# Patient Record
Sex: MALE | Race: WHITE | NOT HISPANIC OR LATINO | Employment: FULL TIME | URBAN - METROPOLITAN AREA
[De-identification: names, ages, dates, MRNs, and addresses within clinical notes are randomized per-mention and may not be internally consistent; named-entity substitution may affect disease eponyms.]

---

## 2017-01-16 PROBLEM — Z00.00 ENCOUNTER FOR PREVENTIVE HEALTH EXAMINATION: Status: ACTIVE | Noted: 2017-01-16

## 2017-01-25 ENCOUNTER — OFFICE VISIT (OUTPATIENT)
Dept: INTERNAL MEDICINE | Facility: CLINIC | Age: 52
End: 2017-01-25
Payer: COMMERCIAL

## 2017-01-25 VITALS
OXYGEN SATURATION: 99 % | TEMPERATURE: 97.6 F | BODY MASS INDEX: 28.62 KG/M2 | HEIGHT: 70 IN | SYSTOLIC BLOOD PRESSURE: 120 MMHG | HEART RATE: 62 BPM | WEIGHT: 199.9 LBS | DIASTOLIC BLOOD PRESSURE: 76 MMHG

## 2017-01-25 DIAGNOSIS — I77.810 MILD ASCENDING AORTA DILATION (H): ICD-10-CM

## 2017-01-25 DIAGNOSIS — I50.22 CHF (CONGESTIVE HEART FAILURE), NYHA CLASS I, CHRONIC, SYSTOLIC (H): ICD-10-CM

## 2017-01-25 DIAGNOSIS — E78.5 HYPERLIPIDEMIA WITH TARGET LDL LESS THAN 100: ICD-10-CM

## 2017-01-25 DIAGNOSIS — I42.8 NONISCHEMIC CARDIOMYOPATHY (H): ICD-10-CM

## 2017-01-25 DIAGNOSIS — J06.9 VIRAL URI: ICD-10-CM

## 2017-01-25 DIAGNOSIS — I42.9 CARDIOMYOPATHY (H): Primary | ICD-10-CM

## 2017-01-25 PROCEDURE — 99213 OFFICE O/P EST LOW 20 MIN: CPT | Performed by: INTERNAL MEDICINE

## 2017-01-25 RX ORDER — CARVEDILOL 6.25 MG/1
6.25 TABLET ORAL
COMMUNITY
Start: 2016-08-17 | End: 2017-08-04

## 2017-01-25 NOTE — NURSING NOTE
"Chief Complaint   Patient presents with     Edema     on R side of throat X 4 days       Initial /76 mmHg  Pulse 62  Temp(Src) 97.6  F (36.4  C) (Oral)  Ht 5' 10\" (1.778 m)  Wt 199 lb 14.4 oz (90.674 kg)  BMI 28.68 kg/m2  SpO2 99% Estimated body mass index is 28.68 kg/(m^2) as calculated from the following:    Height as of this encounter: 5' 10\" (1.778 m).    Weight as of this encounter: 199 lb 14.4 oz (90.674 kg).  BP completed using cuff size: mi Catalan CMA      "

## 2017-01-25 NOTE — PATIENT INSTRUCTIONS
"  Viral upper respiratory infection:     *  No evidence for bacterial infection.     *  No antibiotics indicated based on examination today.  No fevers, normal throat, ears, and normal lung sounds.      *  Tylenol for fevers or headaches;     *  Motrin/Advil for any fevers, body/muscle aches.     *  Cough suppressant dextromethorphan, i.e. Delsym (or any cough medication with a \"DM\"), follow directions on bottle    *  Mucinex extended release, one or two tablets twice per day for the next 5-7 days.  This helps loosen the secretions to they can be passed more easily out of the body.     *  Be sure to drink lots of fluids.  If the appetite and intake of food is way down, then at leat try to eat soup.  Dehydration is the thing that causes people to end up in the hospital with viral infections and the flu.     *  For sore throat:  Try lozenges (Cepostat, Cepacol, hard candies, Zinc lozenges, etc)    *  If you have thick secretions:  Mucinex extended release twice per day on a regular basis for the next few days, then twice per day as needed.  OK to take the regular Mucinex, you may just have to take it 2-3 times per day.      *  If you have dry nasal passages or frequent bloody noses during the winter time:  Saline nasal spray as often as needed for dry nose.     *  For thick sinus secretions, consider using a \"Sinus Rinse Kit\" or Netti Pot For help rinsing out sinus cavities if there is a lot of debris from the sinuses    *  If you have a lot of congestion and/or runny noses:  OK to try decongestants as needed (e.g. pseudoephedrine or phenylephrine).  Be sure to take the lowest dose needed.  Take decongestants from only ONE source.  It is possible to inadvertantly take more than the recommended amounts if you take decongestants from multiple products (i.e. Do NOT take Mucinex-D and Claritin-D together)    *  If you have been told to NOT take decongestants or if you cannot tolerate decongestants due to effect on blood " pressure, sleep or heart rate:  Try Coricidin HBP or Chlortrimeton (chlorpheniramine).  These can have a similar effect as some decongestants but will not affect your heart rate or blood pressure.      *  If you have SEVERE nasal congestion:  Affrin nasal spray as needed for severe nasal congestion (especially before the airplane trip), but do not use for more than one week.      *  For nighttime congestion, consider taking your favorite nighttime multi symptoms cold reliever such as Nyquil, Vicks Formula 44, Tylenol multi symptoms nighttime cold reliever, etc.      *  Call the clinic if any changes in the symptoms such as worsening fevers, or the amount and quality of the sputum changes, or if you have any major difficulties breathing, or the symptoms fail to clear for a few weeks.    *  Most upper respiratory infection will clear 1-2 weeks, but it is not uncommon for post viral coughs to last for several weeks, even rarely the entire winter.

## 2017-01-25 NOTE — PROGRESS NOTES
SUBJECTIVE:                                                    Orlin Griggs is a 51 year old male who presents to clinic today for the following health issues:    Concern - edema     Onset: 4 days     Description:   Swelling in R side of throat, pain in jaw and up in ear    Intensity: moderate    Progression of Symptoms:  constant    Accompanying Signs & Symptoms:  Has seen dentist and no problems in teeth causing pain       Previous history of similar problem:       Precipitating factors:   Worsened by: sometimes worse after eating    Alleviating factors:  Improved by: nothing       Therapies Tried and outcome: no OTC pain relievers          Problem list and histories reviewed & adjusted, as indicated.  Additional history: as documented        Past Medical History:  ---------------------------  Past Medical History   Diagnosis Date     Anxiety      HTN (hypertension)      Cardiomyopathy (H)      idiopathic nonischemic cardiopmyopathy; 2/21/15 Echo shows EF 30-35%, 4/14/15 Echo shows EF improved to 43-46%     LBBB (left bundle branch block)      History of cocaine use      prior     Acute renal insufficiency      CHF (congestive heart failure) (H)      2/21/15 Echo shows EF 30-35%, 4/14/15 Echo shows EF improved to 43-46%     Hyperlipidemia LDL goal < 100        Past Surgical History:  ---------------------------  Past Surgical History   Procedure Laterality Date     Cholecystectomy         Current Medications:  ---------------------------  Current Outpatient Prescriptions   Medication Sig Dispense Refill     carvedilol (COREG) 6.25 MG tablet Take 6.25 mg by mouth       lisinopril (PRINIVIL,ZESTRIL) 5 MG tablet Take 1 tablet (5 mg) by mouth 2 times daily 180 tablet 3     carvedilol (COREG) 12.5 MG tablet Take 1 tablet (12.5 mg) by mouth 2 times daily (with meals) 180 tablet 3       Allergies:  -------------  No Known Allergies    Social History:  -------------------  Social History     Social History     Marital  "Status: Single     Spouse Name: N/A     Number of Children: N/A     Years of Education: N/A     Occupational History     Not on file.     Social History Main Topics     Smoking status: Never Smoker      Smokeless tobacco: Never Used     Alcohol Use: No     Drug Use: No     Sexual Activity: Not on file     Other Topics Concern     Parent/Sibling W/ Cabg, Mi Or Angioplasty Before 65f 55m? No     Caffeine Concern No     none     Sleep Concern No     Weight Concern Yes     weight loss     Special Diet Yes     watched his sodium intake     Exercise Yes     daily      Social History Narrative       Family Medical History:  ------------------------------  Family History   Problem Relation Age of Onset     Coronary Artery Disease No family hx of      DIABETES No family hx of      Breast Cancer Mother      CANCER Father      HEART DISEASE Brother      pacemaker     Pacemaker Brother      Family History Negative Brother      Family History Negative Brother          ROS:  REVIEW OF SYSTEMS:    RESP: negative for cough, dyspnea, wheezing, hemoptysis  CV: negative for chest pain, palpitations, PND, ARNDT, orthopnea; reports no changes in their ability to perform physical activity (from cardiovascular standpoint)  GI: negative for dysphagia, N/V, pain, melena, diarrhea and constipation  NEURO: negative for numbness/tingling, paralysis, incoordination, or focal weakness     OBJECTIVE:                                                    /76 mmHg  Pulse 62  Temp(Src) 97.6  F (36.4  C) (Oral)  Ht 5' 10\" (1.778 m)  Wt 199 lb 14.4 oz (90.674 kg)  BMI 28.68 kg/m2  SpO2 99%     GENERAL alert and no distress  EYES:  Normal sclera,conjunctiva, EOMI  HENT: oral and posterior pharynx without lesions or erythema, facies symmetric  NECK: Neck supple. No LAD, without thyroidmegaly or JVD., Carotids without bruits.  RESP: Clear to ausculation bilaterally without wheezes or crackles. Normal BS in all fields.  CV: RRR normal S1S2 without " murmurs, rubs or gallops. PMI normal  LYMPH: no cervical lymph adenopathy appreciated  MS: extremities- no gross deformities of the visible extremities noted, no edema  PSYCH: Alert and oriented times 3; speech- coherent  SKIN:  No obvious significant skin lesions on visible portions of face          ASSESSMENT/PLAN:                                                      (I42.9) Cardiomyopathy (H)  (primary encounter diagnosis)  Comment: This condition is currently controlled on the current medical regimen.  Continue current therapy.   revieed his lat visit with cardiologist and last echocardiogram at his requets.   Plan:     (I50.22) CHF (congestive heart failure), NYHA class I, chronic, systolic (H)  Comment: This condition is currently controlled on the current medical regimen.  Continue current therapy.   No evidence for congestive heart failure at this time.   Plan:     (E78.5) Hyperlipidemia with target LDL less than 100  Comment: Discussed new guidelines recommending a statin cholesterol lowering medication for any patient with either diabetes and/or vascular disease, aiming for a LDL goal under 100 for sure, ideally under 70.    Reviewed statins and their side effects including muscle pain, muscle inflammation, GI upset.  Told the patient to stop the medication in question and to call if any side effects develop.   Recommended CoQ10 200-300 mg at the same time as taking the statin medication to help reduce the chance of muscle side effects from the statin.    Plan:     (I42.9) Nonischemic cardiomyopathy (H)  Comment: This condition is currently controlled on the current medical regimen.  Continue current therapy.   Plan:     (I77.819) Mild ascending aorta dilation (H)  Comment: This condition is currently controlled on the current medical regimen.  Continue current therapy.   Plan:     (J06.9,  B97.89) Viral URI  Comment: Recommend conservative cares including fluids, rest, OTC decongestants/expectorants as  needed, tylenol or NSAIDs with food.  Beware of GI side effectcs (GI upset, GI bleed in worse case) with NSAIDs.  RTC or call if no improvement or any changes in sx.   Plan: No evidence for bacterial infection based on exam and history,  no antibiotics indicated.       See Patient Instructions    HALIMA SILVERMAN M.D., MD  [facility]

## 2017-02-01 DIAGNOSIS — I10 ESSENTIAL HYPERTENSION, BENIGN: ICD-10-CM

## 2017-02-01 LAB
ALT SERPL W P-5'-P-CCNC: <5 U/L (ref 5–30)
ANION GAP SERPL CALCULATED.3IONS-SCNC: 12 MMOL/L (ref 6–17)
BUN SERPL-MCNC: 21 MG/DL (ref 7–30)
CALCIUM SERPL-MCNC: 9.4 MG/DL (ref 8.5–10.5)
CHLORIDE SERPL-SCNC: 103 MMOL/L (ref 98–107)
CHOLEST SERPL-MCNC: 151 MG/DL
CO2 SERPL-SCNC: 30 MMOL/L (ref 23–29)
CREAT SERPL-MCNC: 1.33 MG/DL (ref 0.7–1.3)
GFR SERPL CREATININE-BSD FRML MDRD: 56 ML/MIN/1.7M2
GLUCOSE SERPL-MCNC: 104 MG/DL (ref 70–105)
HDLC SERPL-MCNC: 30 MG/DL
LDLC SERPL CALC-MCNC: 87 MG/DL
NONHDLC SERPL-MCNC: 121 MG/DL
POTASSIUM SERPL-SCNC: 4 MMOL/L (ref 3.5–5.1)
SODIUM SERPL-SCNC: 141 MMOL/L (ref 136–145)
TRIGL SERPL-MCNC: 169 MG/DL

## 2017-02-01 PROCEDURE — 84460 ALANINE AMINO (ALT) (SGPT): CPT | Performed by: INTERNAL MEDICINE

## 2017-02-01 PROCEDURE — 36415 COLL VENOUS BLD VENIPUNCTURE: CPT | Performed by: INTERNAL MEDICINE

## 2017-02-01 PROCEDURE — 80061 LIPID PANEL: CPT | Performed by: INTERNAL MEDICINE

## 2017-02-01 PROCEDURE — 80048 BASIC METABOLIC PNL TOTAL CA: CPT | Performed by: INTERNAL MEDICINE

## 2017-02-07 ENCOUNTER — OFFICE VISIT (OUTPATIENT)
Dept: CARDIOLOGY | Facility: CLINIC | Age: 52
End: 2017-02-07
Attending: INTERNAL MEDICINE
Payer: COMMERCIAL

## 2017-02-07 ENCOUNTER — TELEPHONE (OUTPATIENT)
Dept: CARDIOLOGY | Facility: CLINIC | Age: 52
End: 2017-02-07

## 2017-02-07 VITALS
SYSTOLIC BLOOD PRESSURE: 118 MMHG | HEIGHT: 70 IN | HEART RATE: 51 BPM | DIASTOLIC BLOOD PRESSURE: 84 MMHG | WEIGHT: 203.8 LBS | BODY MASS INDEX: 29.18 KG/M2

## 2017-02-07 DIAGNOSIS — E78.5 HYPERLIPIDEMIA WITH TARGET LDL LESS THAN 100: ICD-10-CM

## 2017-02-07 DIAGNOSIS — I77.810 MILD ASCENDING AORTA DILATION (H): ICD-10-CM

## 2017-02-07 DIAGNOSIS — R00.2 PALPITATIONS: ICD-10-CM

## 2017-02-07 DIAGNOSIS — I10 ESSENTIAL HYPERTENSION, BENIGN: ICD-10-CM

## 2017-02-07 DIAGNOSIS — I42.8 NONISCHEMIC CARDIOMYOPATHY (H): Primary | ICD-10-CM

## 2017-02-07 DIAGNOSIS — I44.7 LBBB (LEFT BUNDLE BRANCH BLOCK): ICD-10-CM

## 2017-02-07 PROCEDURE — 99214 OFFICE O/P EST MOD 30 MIN: CPT | Performed by: INTERNAL MEDICINE

## 2017-02-07 NOTE — PROGRESS NOTES
HPI and Plan:   See dictation    Orders Placed This Encounter   Procedures     Basic metabolic panel     Lipid Profile     ALT     Follow-Up with Cardiologist     Echocardiogram       No orders of the defined types were placed in this encounter.       There are no discontinued medications.      Encounter Diagnoses   Name Primary?     Nonischemic cardiomyopathy (H) Yes     Palpitations      Essential hypertension, benign      Mild ascending aorta dilation (H)      Hyperlipidemia with target LDL less than 100      LBBB (left bundle branch block)        CURRENT MEDICATIONS:  Current Outpatient Prescriptions   Medication Sig Dispense Refill     carvedilol (COREG) 6.25 MG tablet Take 6.25 mg by mouth       lisinopril (PRINIVIL,ZESTRIL) 5 MG tablet Take 1 tablet (5 mg) by mouth 2 times daily 180 tablet 3     carvedilol (COREG) 12.5 MG tablet Take 1 tablet (12.5 mg) by mouth 2 times daily (with meals) 180 tablet 3       ALLERGIES   No Known Allergies    PAST MEDICAL HISTORY:  Past Medical History   Diagnosis Date     Anxiety      HTN (hypertension)      Cardiomyopathy (H)      idiopathic nonischemic cardiopmyopathy; 2/21/15 Echo shows EF 30-35%, 4/14/15 Echo shows EF improved to 43-46%     LBBB (left bundle branch block)      History of cocaine use      prior     Acute renal insufficiency      CHF (congestive heart failure) (H)      2/21/15 Echo shows EF 30-35%, 4/14/15 Echo shows EF improved to 43-46%     Hyperlipidemia LDL goal < 100        PAST SURGICAL HISTORY:  Past Surgical History   Procedure Laterality Date     Cholecystectomy         FAMILY HISTORY:  Family History   Problem Relation Age of Onset     Coronary Artery Disease No family hx of      DIABETES No family hx of      Breast Cancer Mother      CANCER Father      HEART DISEASE Brother      pacemaker     Pacemaker Brother      Family History Negative Brother      Family History Negative Brother        SOCIAL HISTORY:  Social History     Social History      "Marital Status: Single     Spouse Name: N/A     Number of Children: N/A     Years of Education: N/A     Social History Main Topics     Smoking status: Never Smoker      Smokeless tobacco: Never Used     Alcohol Use: No     Drug Use: No     Sexual Activity: Not Asked     Other Topics Concern     Parent/Sibling W/ Cabg, Mi Or Angioplasty Before 65f 55m? No     Caffeine Concern No     none     Sleep Concern No     Weight Concern Yes     weight loss     Special Diet Yes     watched his sodium intake     Exercise Yes     daily      Social History Narrative       Review of Systems:  Skin:  Negative       Eyes:  Positive for glasses;contacts    ENT:  Negative      Respiratory:  Negative       Cardiovascular:  Negative      Gastroenterology: Negative      Genitourinary:  not assessed      Musculoskeletal:  Negative      Neurologic:  Negative      Psychiatric:  Negative      Heme/Lymph/Imm:  Negative      Endocrine:  Negative        Physical Exam:  Vitals: /84 mmHg  Pulse 51  Ht 1.778 m (5' 10\")  Wt 92.443 kg (203 lb 12.8 oz)  BMI 29.24 kg/m2    Constitutional:  cooperative, alert and oriented, well developed, well nourished, in no acute distress        Skin:  warm and dry to the touch        Head:           Eyes:  pupils equal and round        ENT:  no pallor or cyanosis, dentition good        Neck:  carotid pulses are full and equal bilaterally, JVP normal, no carotid bruit, no thyromegaly        Chest:  clear to auscultation          Cardiac: regular rhythm, normal S1/S2, no S3 or S4, apical impulse not displaced, no murmurs, gallops or rubs                  Abdomen:  not assessed this visit        Vascular: not assessed this visit                                        Extremities and Back:  no edema              Neurological:  affect appropriate, oriented to time, person and place              CC  Jose Mata MD   [facility]  [address]  [place] [id number]                "

## 2017-02-07 NOTE — MR AVS SNAPSHOT
After Visit Summary   2/7/2017    Orlin Griggs    MRN: [id number]           Patient Information     Date Of Birth          1965        Visit Information        Provider Department      2/7/2017 10:15 AM Jose Mata MD [facility]        Today's Diagnoses     Nonischemic cardiomyopathy (H)    -  1     Palpitations         Essential hypertension, benign         Mild ascending aorta dilation (H)         Hyperlipidemia with target LDL less than 100         LBBB (left bundle branch block)            Follow-ups after your visit        Additional Services     Follow-Up with Cardiologist                 Future tests that were ordered for you today     Open Future Orders        Priority Expected Expires Ordered    Lipid Profile Routine 8/6/2017 2/7/2018 2/7/2017    ALT Routine 8/6/2017 2/7/2018 2/7/2017    Basic metabolic panel Routine 8/6/2017 2/7/2018 2/7/2017    Echocardiogram Routine 8/6/2017 2/7/2018 2/7/2017    Follow-Up with Cardiologist Routine 8/6/2017 2/7/2018 2/7/2017            Who to contact     If you have questions or need follow up information about today's clinic visit or your schedule please contact [facility] directly at [phone].  Normal or non-critical lab and imaging results will be communicated to you by [employer], letter or phone within 4 business days after the clinic has received the results. If you do not hear from us within 7 days, please contact the clinic through [employer] or phone. If you have a critical or abnormal lab result, we will notify you by phone as soon as possible.  Submit refill requests through [employer] or call your pharmacy and they will forward the refill request to us. Please allow 3 business days for your refill to be completed.          Additional Information About Your Visit        [employer] Information     [employer] lets you send messages to your doctor, view your test  "results, renew your prescriptions, schedule appointments and more. To sign up, go to [email or web address] . Click on \"Log in\" on the left side of the screen, which will take you to the Welcome page. Then click on \"Sign up Now\" on the right side of the page.     You will be asked to enter the access code listed below, as well as some personal information. Please follow the directions to create your username and password.     Your access code is: [email or web address]  Expires: 2017 11:17 AM     Your access code will  in 90 days. If you need help or a new code, please call your [facility] clinic or [phone].        Care EveryWhere ID     This is your Care EveryWhere ID. This could be used by other organizations to access your [facility] medical records  [id number]        Your Vitals Were     Pulse Height BMI (Body Mass Index)             51 1.778 m (5' 10\") 29.24 kg/m2          Blood Pressure from Last 3 Encounters:   17 118/84   17 120/76   07/15/16 122/84    Weight from Last 3 Encounters:   17 92.443 kg (203 lb 12.8 oz)   17 90.674 kg (199 lb 14.4 oz)   07/15/16 91.264 kg (201 lb 3.2 oz)              We Performed the Following     Follow-Up with Cardiologist        Primary Care Provider Office Phone # Fax #    Lyndon Steve [phone]       [facility] [address] [place] [id number]        Thank you!     Thank you for choosing [facility] [place]  for your care. Our goal is always to provide you with excellent care. Hearing back from our patients is one way we can continue to improve our services. Please take a few minutes to complete the written survey that you may receive in the mail after your visit with us. Thank you!             Your Updated Medication List - Protect others around you: Learn how to safely use, store and throw away your medicines at www.disposemymeds.org.          This list is accurate as " of: 2/7/17 11:17 AM.  Always use your most recent med list.                   Brand Name Dispense Instructions for use    * carvedilol 12.5 MG tablet    COREG    180 tablet    Take 1 tablet (12.5 mg) by mouth 2 times daily (with meals)       * carvedilol 6.25 MG tablet    COREG     Take 6.25 mg by mouth       lisinopril 5 MG tablet    PRINIVIL/ZESTRIL    180 tablet    Take 1 tablet (5 mg) by mouth 2 times daily       * Notice:  This list has 2 medication(s) that are the same as other medications prescribed for you. Read the directions carefully, and ask your doctor or other care provider to review them with you.

## 2017-02-07 NOTE — Clinical Note
2017             Lyndon Steve MD    [facility]   [address] [place] [address]      RE:    Orlin Griggs   MRN:  [id number]   :  1965      Dear Jamel:      I had the opportunity to see Mr. Orlin Griggs in Cardiology Clinic today at the [facility] in [place] for reevaluation of idiopathic cardiomyopathy.  As you may recall, he is a 51-year-old gentleman who was hospitalized in early  with congestive heart failure and diagnosed with idiopathic cardiomyopathy with an ejection fraction initially of 30%-35%.  He has normal coronary arteries by CT angiogram.  He was treated aggressively with medical therapy, primarily carvedilol and lisinopril, and a followup cardiac MRI has demonstrated that his ejection fraction improved up to 51%.  His echocardiograms have consistently demonstrated an ejection fraction in the range of 45%-50%.  Most recently, his last cardiac MRI was performed on 2016 and showed some continued improvement of his left ventricular function now with an ejection fraction up to 54%.  His right ventricular size and function has normalized completely with an ejection fraction of 65%.  There is no evidence of any myocardial scar, inflammation or infiltration.  We have also been keeping a close eye on his ascending aorta.  He has had some mild dilation, measured at 4.0 cm on the most recent cardiac MRI.  His left ventricular chamber size has completely normalized.      He continues to eat a very healthy diet and exercise regularly, although he indicates that he has not been exercising as vigorously as he had before.  He is eager to step up his exercise program once again.  He is not experiencing any concerning shortness of breath or chest discomfort symptoms.  He has had some palpitations which he seems to be able to correlate with either some mild alcohol use or increase in sodium intake on the day prior to those symptoms.  Fortunately,  those seem to be brief and only rare events.  He has no sustained palpitations for any prolonged period of time.  These seem to be mostly momentary palpitations that are frequent for several hours and then resolve again.      On examination today, his blood pressure was initially 118/84 and I retook it and found it to be 114/76.  His heart rate was 51 beats per minute.  Weight 203.  His lungs are quite clear.  His heart rhythm is regular.  He has no cardiac murmurs or carotid bruits.      IMPRESSIONS:  Mr. Orlin Griggs is a 51-year-old gentleman with a history of idiopathic cardiomyopathy with near normalization of left ventricular function with medical therapy.  He has some symptoms of palpitations which I suspect are due to some occasional benign atrial and ventricular ectopy, but not likely concerning.  His laboratory studies look pretty good, although I see that his creatinine is just slightly above the normal limit and unchanged since last July.  I suspect that may be due to lisinopril.  His cholesterol panel continues to look excellent, although his HDL has gone down slightly, probably due to some decrease in his usually vigorous exercise program.      He did go to [facility] in [place] for a second opinion regarding his cardiac status.  Fortunately, they were able to confirm our findings here and the treatment program.  He is pleased that he is on the right track with these issues.      I will plan to have him return to visit with me again in 6 months and repeat an echocardiogram again at that time.      Sincerely,         Jose Mata MD, F.A.C.C.

## 2017-02-08 NOTE — PROGRESS NOTES
2017             Lyndon Steve MD    [facility]   [address] [place] [address]      RE:    Orlin Griggs   MRN:  [id number]   :  1965      Dear Jamel:      I had the opportunity to see Mr. Orlin Griggs in Cardiology Clinic today at the [facility] in [place] for reevaluation of idiopathic cardiomyopathy.  As you may recall, he is a 51-year-old gentleman who was hospitalized in early  with congestive heart failure and diagnosed with idiopathic cardiomyopathy with an ejection fraction initially of 30%-35%.  He has normal coronary arteries by CT angiogram.  He was treated aggressively with medical therapy, primarily carvedilol and lisinopril, and a followup cardiac MRI has demonstrated that his ejection fraction improved up to 51%.  His echocardiograms have consistently demonstrated an ejection fraction in the range of 45%-50%.  Most recently, his last cardiac MRI was performed on 2016 and showed some continued improvement of his left ventricular function now with an ejection fraction up to 54%.  His right ventricular size and function has normalized completely with an ejection fraction of 65%.  There is no evidence of any myocardial scar, inflammation or infiltration.  We have also been keeping a close eye on his ascending aorta.  He has had some mild dilation, measured at 4.0 cm on the most recent cardiac MRI.  His left ventricular chamber size has completely normalized.      He continues to eat a very healthy diet and exercise regularly, although he indicates that he has not been exercising as vigorously as he had before.  He is eager to step up his exercise program once again.  He is not experiencing any concerning shortness of breath or chest discomfort symptoms.  He has had some palpitations which he seems to be able to correlate with either some mild alcohol use or increase in sodium intake on the day prior to those symptoms.  Fortunately,  those seem to be brief and only rare events.  He has no sustained palpitations for any prolonged period of time.  These seem to be mostly momentary palpitations that are frequent for several hours and then resolve again.      On examination today, his blood pressure was initially 118/84 and I retook it and found it to be 114/76.  His heart rate was 51 beats per minute.  Weight 203.  His lungs are quite clear.  His heart rhythm is regular.  He has no cardiac murmurs or carotid bruits.      IMPRESSIONS:  Mr. Orlin Griggs is a 51-year-old gentleman with a history of idiopathic cardiomyopathy with near normalization of left ventricular function with medical therapy.  He has some symptoms of palpitations which I suspect are due to some occasional benign atrial and ventricular ectopy, but not likely concerning.  His laboratory studies look pretty good, although I see that his creatinine is just slightly above the normal limit and unchanged since last July.  I suspect that may be due to lisinopril.  His cholesterol panel continues to look excellent, although his HDL has gone down slightly, probably due to some decrease in his usually vigorous exercise program.      He did go to [facility] in [place] for a second opinion regarding his cardiac status.  Fortunately, they were able to confirm our findings here and the treatment program.  He is pleased that he is on the right track with these issues.      I will plan to have him return to visit with me again in 6 months and repeat an echocardiogram again at that time.      Sincerely,         Meena Mata MD, F.A.C.C.         MEENA MATA MD, [facility]             D: 2017 17:45   T: 2017 08:56   MT: LESLEY      Name:     ORLIN GRIGGS   MRN:      [id number]        Account:      [id number]   :      1965           Service Date: 2017      Document: [id number]

## 2017-02-14 ENCOUNTER — APPOINTMENT (OUTPATIENT)
Dept: ORTHOPEDIC SURGERY | Facility: CLINIC | Age: 52
End: 2017-02-14

## 2017-03-02 ENCOUNTER — TELEPHONE (OUTPATIENT)
Dept: CARDIOLOGY | Facility: CLINIC | Age: 52
End: 2017-03-02

## 2017-03-02 DIAGNOSIS — I50.22 CHF (CONGESTIVE HEART FAILURE), NYHA CLASS I, CHRONIC, SYSTOLIC (H): ICD-10-CM

## 2017-03-02 RX ORDER — CARVEDILOL 12.5 MG/1
12.5 TABLET ORAL 2 TIMES DAILY WITH MEALS
Qty: 180 TABLET | Refills: 3 | Status: SHIPPED | OUTPATIENT
Start: 2017-03-02 | End: 2017-08-23

## 2017-03-02 NOTE — TELEPHONE ENCOUNTER
Contacted patient to verify Carvedilol 12.5mg BID and 6.25mg BID to equal 18.75mg .  Patient does need both RX's

## 2017-03-09 ENCOUNTER — DOCUMENTATION ONLY (OUTPATIENT)
Dept: CARDIOLOGY | Facility: CLINIC | Age: 52
End: 2017-03-09

## 2017-03-09 NOTE — PROGRESS NOTES
Incoming records as requested from [facility] received. Copy sent to HIM to be scanned and a copy for Dr. Mata at nurses desk to review.

## 2017-07-03 DIAGNOSIS — I42.9 CARDIOMYOPATHY (H): ICD-10-CM

## 2017-07-03 RX ORDER — LISINOPRIL 5 MG/1
5 TABLET ORAL 2 TIMES DAILY
Qty: 180 TABLET | Refills: 0 | Status: SHIPPED | OUTPATIENT
Start: 2017-07-03 | End: 2017-08-23

## 2017-07-21 ENCOUNTER — HOSPITAL ENCOUNTER (OUTPATIENT)
Dept: CARDIOLOGY | Facility: CLINIC | Age: 52
Discharge: HOME | End: 2017-07-21
Attending: INTERNAL MEDICINE | Admitting: INTERNAL MEDICINE
Payer: COMMERCIAL

## 2017-07-21 DIAGNOSIS — I42.8 NONISCHEMIC CARDIOMYOPATHY (H): ICD-10-CM

## 2017-07-21 PROCEDURE — 93306 TTE W/DOPPLER COMPLETE: CPT

## 2017-07-21 PROCEDURE — 93306 TTE W/DOPPLER COMPLETE: CPT | Mod: 26 | Performed by: INTERNAL MEDICINE

## 2017-08-04 DIAGNOSIS — I50.9 CHF (CONGESTIVE HEART FAILURE) (H): Primary | ICD-10-CM

## 2017-08-04 RX ORDER — CARVEDILOL 6.25 MG/1
6.25 TABLET ORAL 2 TIMES DAILY WITH MEALS
Qty: 60 TABLET | Refills: 0 | Status: SHIPPED | OUTPATIENT
Start: 2017-08-04 | End: 2017-08-23

## 2017-08-23 ENCOUNTER — OFFICE VISIT (OUTPATIENT)
Dept: CARDIOLOGY | Facility: CLINIC | Age: 52
End: 2017-08-23
Attending: INTERNAL MEDICINE
Payer: COMMERCIAL

## 2017-08-23 VITALS
WEIGHT: 203.2 LBS | BODY MASS INDEX: 29.09 KG/M2 | HEART RATE: 57 BPM | DIASTOLIC BLOOD PRESSURE: 86 MMHG | SYSTOLIC BLOOD PRESSURE: 128 MMHG | HEIGHT: 70 IN

## 2017-08-23 DIAGNOSIS — R00.2 PALPITATIONS: ICD-10-CM

## 2017-08-23 DIAGNOSIS — E78.5 HYPERLIPIDEMIA WITH TARGET LDL LESS THAN 100: ICD-10-CM

## 2017-08-23 DIAGNOSIS — I44.7 LBBB (LEFT BUNDLE BRANCH BLOCK): ICD-10-CM

## 2017-08-23 DIAGNOSIS — I10 ESSENTIAL HYPERTENSION, BENIGN: ICD-10-CM

## 2017-08-23 DIAGNOSIS — I42.8 NONISCHEMIC CARDIOMYOPATHY (H): ICD-10-CM

## 2017-08-23 DIAGNOSIS — I77.810 MILD ASCENDING AORTA DILATION (H): ICD-10-CM

## 2017-08-23 DIAGNOSIS — I42.8 NONISCHEMIC CARDIOMYOPATHY (H): Primary | ICD-10-CM

## 2017-08-23 LAB
ALT SERPL W P-5'-P-CCNC: <5 U/L (ref 5–30)
ANION GAP SERPL CALCULATED.3IONS-SCNC: 7.6 MMOL/L (ref 6–17)
BUN SERPL-MCNC: 15 MG/DL (ref 7–30)
CALCIUM SERPL-MCNC: 9.4 MG/DL (ref 8.5–10.5)
CHLORIDE SERPL-SCNC: 106 MMOL/L (ref 98–107)
CHOLEST SERPL-MCNC: 176 MG/DL
CO2 SERPL-SCNC: 30 MMOL/L (ref 23–29)
CREAT SERPL-MCNC: 1.25 MG/DL (ref 0.7–1.3)
GFR SERPL CREATININE-BSD FRML MDRD: 60 ML/MIN/1.7M2
GLUCOSE SERPL-MCNC: 100 MG/DL (ref 70–105)
HDLC SERPL-MCNC: 37 MG/DL
LDLC SERPL CALC-MCNC: 112 MG/DL
NONHDLC SERPL-MCNC: 139 MG/DL
POTASSIUM SERPL-SCNC: 4.6 MMOL/L (ref 3.5–5.1)
SODIUM SERPL-SCNC: 139 MMOL/L (ref 136–145)
TRIGL SERPL-MCNC: 134 MG/DL

## 2017-08-23 PROCEDURE — 99214 OFFICE O/P EST MOD 30 MIN: CPT | Performed by: INTERNAL MEDICINE

## 2017-08-23 PROCEDURE — 84460 ALANINE AMINO (ALT) (SGPT): CPT | Performed by: INTERNAL MEDICINE

## 2017-08-23 PROCEDURE — 80061 LIPID PANEL: CPT | Performed by: INTERNAL MEDICINE

## 2017-08-23 PROCEDURE — 80048 BASIC METABOLIC PNL TOTAL CA: CPT | Performed by: INTERNAL MEDICINE

## 2017-08-23 PROCEDURE — 36415 COLL VENOUS BLD VENIPUNCTURE: CPT | Performed by: INTERNAL MEDICINE

## 2017-08-23 RX ORDER — CARVEDILOL 6.25 MG/1
6.25 TABLET ORAL 2 TIMES DAILY WITH MEALS
Qty: 60 TABLET | Refills: 0 | Status: SHIPPED | OUTPATIENT
Start: 2017-08-23 | End: 2017-08-31

## 2017-08-23 RX ORDER — CARVEDILOL 12.5 MG/1
12.5 TABLET ORAL 2 TIMES DAILY WITH MEALS
Qty: 180 TABLET | Refills: 3 | Status: SHIPPED | OUTPATIENT
Start: 2017-08-23 | End: 2017-12-01

## 2017-08-23 RX ORDER — LISINOPRIL 5 MG/1
5 TABLET ORAL 2 TIMES DAILY
Qty: 180 TABLET | Refills: 3 | Status: SHIPPED | OUTPATIENT
Start: 2017-08-23 | End: 2017-12-01

## 2017-08-23 NOTE — PROGRESS NOTES
HISTORY OF PRESENT ILLNESS:  I had the opportunity to see Mr. Orlin Griggs in Cardiology Clinic today at the [facility] in [place] for reevaluation of idiopathic cardiomyopathy.  As you know, he is a 52-year-old gentleman who had an ejection fraction estimated to be as low as 30%-35% by echocardiogram initially when he was diagnosed with cardiomyopathy in 2015.  His coronary arteries were normal by CT angiogram.  With medical therapy, including carvedilol and lisinopril, he has had an excellent response.  His ejection fraction improved up to 51% by cardiac MRI initially and more recently 54%.  His right ventricular ejection fraction completely normalized up to 65%.        His echocardiograms have continued to show borderline low left ventricular function with an ejection fraction of 45%-50%, although that may be underestimated due to the appearance of septal dyskinesis due to underlying left bundle branch block.  His MRI has not shown any evidence of myocardial scar, inflammation or infiltration.  He also has had mild dilation of the ascending aorta, measured at 4.0 cm on cardiac MRI and consistently about 4.2 cm by echo.  He had a followup echocardiogram most recently done on 07/21/2017, which again showed stable ascending aorta, a measurement of 4.2 cm and ejection fraction of 45%-50%.  He has the usual trace to mild mitral and tricuspid regurgitation.  His left ventricular chamber size is completely normal at 4.4 cm.      He continues to do very well without any concerning cardiac symptoms.  He has no shortness of breath, chest discomfort, lightheadedness or even palpitations.  He was experiencing some palpitations in the past but recently he has been feeling very well and his palpitations have even resolved.      He has had some borderline creatinine numbers in the past.  Today, his creatinine was 1.25, indicating a GFR 60, which is at the upper limits of normal.  His cholesterol numbers are  good, although a bit higher than previous numbers.      PHYSICAL EXAMINATION:  His blood pressure is 128/86, which is a bit higher than usual for him as well.  He admits to being a bit anxious for our discussion today.  His heart rate was 57 and weight 203 pounds.  His lungs are clear.  His heart rhythm is regular.  He has no cardiac murmurs or carotid bruits.      IMPRESSIONS:  Mr. Orlin Griggs is a 52-year-old gentleman with stable, mild, idiopathic cardiomyopathy without any evidence of congestive heart failure or significant arrhythmia.  His left ventricular function has essentially normalized by cardiac MRI, although was slightly below the limit by echocardiogram.  He has no significant valvular disease or arrhythmia.  His medical therapy is excellent.  His blood pressure is well controlled and his cholesterol numbers are good.  He has no significant underlying vascular disease.      I reassured him that this process is stable and his medical therapy is quite appropriate.  I did not make any medication changes today.  I did continue to urge him to refrain from consistent heavy lifting, but occasional lifting for a job might not cause him any significant problems.  I will have him follow up with me again in 6 months and we will repeat a cardiac MRI to continue to monitor his aorta and left ventricular function.      cc:      Lyndon Steve MD    [facility]   [address] [place] [id number]         MEENA AGAGRWAL MD, [facility]             D: 2017 17:36   T: 2017 18:15   MT: LESLEY      Name:     ORLIN GRIGGS   MRN:      [id number]        Account:      [id number]   :      1965           Service Date: 2017      Document: [id number]

## 2017-08-23 NOTE — LETTER
8/23/2017    Lyndon Steve MD  [address] [facility] [address]    RE: Orlin Griggs       Dear Colleague,    I had the pleasure of seeing Orlin Griggs in the [facility].    I had the opportunity to see Mr. Orlin Griggs in Cardiology Clinic today at the [facility] in [place] for reevaluation of idiopathic cardiomyopathy.  As you know, he is a 52-year-old gentleman who had an ejection fraction estimated to be as low as 30%-35% by echocardiogram initially when he was diagnosed with cardiomyopathy in 2015.  His coronary arteries were normal by CT angiogram.  With medical therapy, including carvedilol and lisinopril, he has had an excellent response.  His ejection fraction improved up to 51% by cardiac MRI initially and more recently 54%.  His right ventricular ejection fraction completely normalized up to 65%.        His echocardiograms have continued to show borderline low left ventricular function with an ejection fraction of 45%-50%, although that may be underestimated due to the appearance of septal dyskinesis due to underlying left bundle branch block.  His MRI has not shown any evidence of myocardial scar, inflammation or infiltration.  He also has had mild dilation of the ascending aorta, measured at 4.0 cm on cardiac MRI and consistently about 4.2 cm by echo.  He had a followup echocardiogram most recently done on 07/21/2017, which again showed stable ascending aorta, a measurement of 4.2 cm and ejection fraction of 45%-50%.  He has the usual trace to mild mitral and tricuspid regurgitation.  His left ventricular chamber size is completely normal at 4.4 cm.      He continues to do very well without any concerning cardiac symptoms.  He has no shortness of breath, chest discomfort, lightheadedness or even palpitations.  He was experiencing some palpitations in the past but recently he has been feeling very well and his palpitations have even  resolved.      He has had some borderline creatinine numbers in the past.  Today, his creatinine was 1.25, indicating a GFR 60, which is at the upper limits of normal.  His cholesterol numbers are good, although a bit higher than previous numbers.      PHYSICAL EXAMINATION:  His blood pressure is 128/86, which is a bit higher than usual for him as well.  He admits to being a bit anxious for our discussion today.  His heart rate was 57 and weight 203 pounds.  His lungs are clear.  His heart rhythm is regular.  He has no cardiac murmurs or carotid bruits.     Outpatient Encounter Prescriptions as of 8/23/2017   Medication Sig Dispense Refill     lisinopril (PRINIVIL/ZESTRIL) 5 MG tablet Take 1 tablet (5 mg) by mouth 2 times daily 180 tablet 3     carvedilol (COREG) 12.5 MG tablet Take 1 tablet (12.5 mg) by mouth 2 times daily (with meals) 180 tablet 3     [DISCONTINUED] carvedilol (COREG) 6.25 MG tablet Take 1 tablet (6.25 mg) by mouth 2 times daily (with meals) 60 tablet 0     [DISCONTINUED] carvedilol (COREG) 6.25 MG tablet Take 1 tablet (6.25 mg) by mouth 2 times daily (with meals) 60 tablet 0     [DISCONTINUED] lisinopril (PRINIVIL/ZESTRIL) 5 MG tablet Take 1 tablet (5 mg) by mouth 2 times daily 180 tablet 0     [DISCONTINUED] carvedilol (COREG) 12.5 MG tablet Take 1 tablet (12.5 mg) by mouth 2 times daily (with meals) 180 tablet 3     No facility-administered encounter medications on file as of 8/23/2017.       IMPRESSIONS:  Mr. Orlin Griggs is a 52-year-old gentleman with stable, mild, idiopathic cardiomyopathy without any evidence of congestive heart failure or significant arrhythmia.  His left ventricular function has essentially normalized by cardiac MRI, although was slightly below the limit by echocardiogram.  He has no significant valvular disease or arrhythmia.  His medical therapy is excellent.  His blood pressure is well controlled and his cholesterol numbers are good.  He has no significant underlying  vascular disease.      I reassured him that this process is stable and his medical therapy is quite appropriate.  I did not make any medication changes today.  I did continue to urge him to refrain from consistent heavy lifting, but occasional lifting for a job might not cause him any significant problems.  I will have him follow up with me again in 6 months and we will repeat a cardiac MRI to continue to monitor his aorta and left ventricular function.     Again, thank you for allowing me to participate in the care of your patient.      Sincerely,    MEENA AGGARWAL MD     [facility]

## 2017-08-23 NOTE — PROGRESS NOTES
HPI and Plan:   See dictation    Orders Placed This Encounter   Procedures     MRI Cardiac w/contrast     Lipid Profile     ALT     Basic metabolic panel     Follow-Up with Cardiologist       Orders Placed This Encounter   Medications     lisinopril (PRINIVIL/ZESTRIL) 5 MG tablet     Sig: Take 1 tablet (5 mg) by mouth 2 times daily     Dispense:  180 tablet     Refill:  3     carvedilol (COREG) 6.25 MG tablet     Sig: Take 1 tablet (6.25 mg) by mouth 2 times daily (with meals)     Dispense:  60 tablet     Refill:  0     carvedilol (COREG) 12.5 MG tablet     Sig: Take 1 tablet (12.5 mg) by mouth 2 times daily (with meals)     Dispense:  180 tablet     Refill:  3       Medications Discontinued During This Encounter   Medication Reason     lisinopril (PRINIVIL/ZESTRIL) 5 MG tablet Reorder     carvedilol (COREG) 6.25 MG tablet Reorder     carvedilol (COREG) 12.5 MG tablet Reorder         Encounter Diagnoses   Name Primary?     Nonischemic cardiomyopathy (H) Yes     Hyperlipidemia with target LDL less than 100      Essential hypertension, benign      Palpitations      Mild ascending aorta dilation (H)      LBBB (left bundle branch block)        CURRENT MEDICATIONS:  Current Outpatient Prescriptions   Medication Sig Dispense Refill     lisinopril (PRINIVIL/ZESTRIL) 5 MG tablet Take 1 tablet (5 mg) by mouth 2 times daily 180 tablet 3     carvedilol (COREG) 6.25 MG tablet Take 1 tablet (6.25 mg) by mouth 2 times daily (with meals) 60 tablet 0     carvedilol (COREG) 12.5 MG tablet Take 1 tablet (12.5 mg) by mouth 2 times daily (with meals) 180 tablet 3     [DISCONTINUED] carvedilol (COREG) 6.25 MG tablet Take 1 tablet (6.25 mg) by mouth 2 times daily (with meals) 60 tablet 0     [DISCONTINUED] lisinopril (PRINIVIL/ZESTRIL) 5 MG tablet Take 1 tablet (5 mg) by mouth 2 times daily 180 tablet 0     [DISCONTINUED] carvedilol (COREG) 12.5 MG tablet Take 1 tablet (12.5 mg) by mouth 2 times daily (with meals) 180 tablet 3        ALLERGIES   No Known Allergies    PAST MEDICAL HISTORY:  Past Medical History:   Diagnosis Date     Acute renal insufficiency      Anxiety      Cardiomyopathy (H)     idiopathic nonischemic cardiopmyopathy; 2/21/15 Echo shows EF 30-35%, 4/14/15 Echo shows EF improved to 43-46%     CHF (congestive heart failure) (H)     2/21/15 Echo shows EF 30-35%, 4/14/15 Echo shows EF improved to 43-46%     History of cocaine use     prior     HTN (hypertension)      Hyperlipidemia LDL goal < 100      LBBB (left bundle branch block)        PAST SURGICAL HISTORY:  Past Surgical History:   Procedure Laterality Date     CHOLECYSTECTOMY         FAMILY HISTORY:  Family History   Problem Relation Age of Onset     Breast Cancer Mother      CANCER Father      HEART DISEASE Brother      pacemaker     Pacemaker Brother      Family History Negative Brother      Family History Negative Brother      Coronary Artery Disease No family hx of      DIABETES No family hx of        SOCIAL HISTORY:  Social History     Social History     Marital status: Single     Spouse name: N/A     Number of children: N/A     Years of education: N/A     Social History Main Topics     Smoking status: Never Smoker     Smokeless tobacco: Never Used     Alcohol use No     Drug use: No     Sexual activity: Not Asked     Other Topics Concern     Parent/Sibling W/ Cabg, Mi Or Angioplasty Before 65f 55m? No     Caffeine Concern No     none     Sleep Concern No     Weight Concern Yes     weight loss     Special Diet Yes     watched his sodium intake     Exercise Yes     daily      Social History Narrative       Review of Systems:  Skin:  Negative       Eyes:  Positive for contacts;glasses    ENT:  Negative      Respiratory:  Negative       Cardiovascular:  Negative      Gastroenterology: Negative      Genitourinary:  not assessed      Musculoskeletal:  Negative      Neurologic:  Negative      Psychiatric:  Negative      Heme/Lymph/Imm:  Negative      Endocrine:   "Negative        Physical Exam:  Vitals: /86  Pulse 57  Ht 1.778 m (5' 10\")  Wt 92.2 kg (203 lb 3.2 oz)  BMI 29.16 kg/m2    Constitutional:  cooperative, alert and oriented, well developed, well nourished, in no acute distress        Skin:  warm and dry to the touch, no apparent skin lesions or masses noted        Head:  normocephalic, no masses or lesions        Eyes:  pupils equal and round        ENT:  no pallor or cyanosis, dentition good        Neck:  carotid pulses are full and equal bilaterally, JVP normal, no carotid bruit, no thyromegaly        Chest:  clear to auscultation          Cardiac: regular rhythm, normal S1/S2, no S3 or S4, apical impulse not displaced, no murmurs, gallops or rubs                  Abdomen:  BS normoactive        Vascular: pulses full and equal                                        Extremities and Back:  no edema;no deformities, clubbing, cyanosis, erythema observed              Neurological:  affect appropriate, oriented to time, person and place;no gross motor deficits                Jose Mata MD  [address]  [place] [address]              "

## 2017-08-23 NOTE — MR AVS SNAPSHOT
After Visit Summary   8/23/2017    Orlin Griggs    MRN: [id number]           Patient Information     Date Of Birth          1965        Visit Information        Provider Department      8/23/2017 9:45 AM Jose Mata MD [facility]        Today's Diagnoses     Nonischemic cardiomyopathy (H)    -  1    Hyperlipidemia with target LDL less than 100        Essential hypertension, benign        Palpitations        Mild ascending aorta dilation (H)        LBBB (left bundle branch block)        Cardiomyopathy, unspecified type (H)        Chronic systolic congestive heart failure (H)        CHF (congestive heart failure), NYHA class I, chronic, systolic (H)           Follow-ups after your visit        Additional Services     Follow-Up with Cardiologist                 Future tests that were ordered for you today     Open Future Orders        Priority Expected Expires Ordered    MRI Cardiac w/contrast Routine 2/21/2018 8/23/2018 8/23/2017    Lipid Profile Routine 2/19/2018 8/23/2018 8/23/2017    ALT Routine 2/19/2018 8/23/2018 8/23/2017    Basic metabolic panel Routine 2/19/2018 8/23/2018 8/23/2017    Follow-Up with Cardiologist Routine 2/19/2018 8/23/2018 8/23/2017            Who to contact     If you have questions or need follow up information about today's clinic visit or your schedule please contact [facility] directly at [phone].  Normal or non-critical lab and imaging results will be communicated to you by MyChart, letter or phone within 4 business days after the clinic has received the results. If you do not hear from us within 7 days, please contact the clinic through MyChart or phone. If you have a critical or abnormal lab result, we will notify you by phone as soon as possible.  Submit refill requests through [employer] or call your pharmacy and they will forward the refill request to us. Please allow 3  "business days for your refill to be completed.          Additional Information About Your Visit        MyChart Information     [employer] lets you send messages to your doctor, view your test results, renew your prescriptions, schedule appointments and more. To sign up, go to [email or web address] . Click on \"Log in\" on the left side of the screen, which will take you to the Welcome page. Then click on \"Sign up Now\" on the right side of the page.     You will be asked to enter the access code listed below, as well as some personal information. Please follow the directions to create your username and password.     Your access code is: [email or web address]  Expires: 2017 10:48 AM     Your access code will  in 90 days. If you need help or a new code, please call your [place] clinic or [phone].        Care EveryWhere ID     This is your Care EveryWhere ID. This could be used by other organizations to access your [place] medical records  [id number]        Your Vitals Were     Pulse Height BMI (Body Mass Index)             57 1.778 m (5' 10\") 29.16 kg/m2          Blood Pressure from Last 3 Encounters:   17 128/86   17 118/84   17 120/76    Weight from Last 3 Encounters:   17 92.2 kg (203 lb 3.2 oz)   17 92.4 kg (203 lb 12.8 oz)   17 90.7 kg (199 lb 14.4 oz)              We Performed the Following     Follow-Up with Cardiologist          Where to get your medicines      These medications were sent to [facility] [id number] [place] [facility] [address]  [address] [facility] [address]     Phone:  [phone]     carvedilol 12.5 MG tablet    carvedilol 6.25 MG tablet    lisinopril 5 MG tablet          Primary Care Provider Office Phone # Fax #    Lyndon Steve [phone]       [address]  [facility] [address]        Equal Access to Services     LYNNE ARELLANO : gemma Orantes julio canales " kvng deleonalyssa mason'aan ah. So [facility] [phone].    ATENCIÓN: Si brandy miller, tiene a maldonado disposición servicios gratuitos de asistencia lingüística. Davin chi [phone].    We comply with applicable federal civil rights laws and [place] laws. We do not discriminate on the basis of race, color, national origin, age, disability sex, sexual orientation or gender identity.            Thank you!     Thank you for choosing [facility]  for your care. Our goal is always to provide you with excellent care. Hearing back from our patients is one way we can continue to improve our services. Please take a few minutes to complete the written survey that you may receive in the mail after your visit with us. Thank you!             Your Updated Medication List - Protect others around you: Learn how to safely use, store and throw away your medicines at www.disposemymeds.org.          This list is accurate as of: 8/23/17 10:53 AM.  Always use your most recent med list.                   Brand Name Dispense Instructions for use Diagnosis    * carvedilol 6.25 MG tablet    COREG    60 tablet    Take 1 tablet (6.25 mg) by mouth 2 times daily (with meals)    Chronic systolic congestive heart failure (H)       * carvedilol 12.5 MG tablet    COREG    180 tablet    Take 1 tablet (12.5 mg) by mouth 2 times daily (with meals)    CHF (congestive heart failure), NYHA class I, chronic, systolic (H)       lisinopril 5 MG tablet    PRINIVIL/ZESTRIL    180 tablet    Take 1 tablet (5 mg) by mouth 2 times daily    Cardiomyopathy, unspecified type (H)       * Notice:  This list has 2 medication(s) that are the same as other medications prescribed for you. Read the directions carefully, and ask your doctor or other care provider to review them with you.

## 2017-08-24 ENCOUNTER — TELEPHONE (OUTPATIENT)
Dept: CARDIOLOGY | Facility: CLINIC | Age: 52
End: 2017-08-24

## 2017-08-24 NOTE — TELEPHONE ENCOUNTER
Disabililty Forms completed for [employer] and patient notified forms faxed.  Copies sent to HIM to be scanned.

## 2017-08-31 DIAGNOSIS — I42.9 CARDIOMYOPATHY, UNSPECIFIED (H): Primary | ICD-10-CM

## 2017-08-31 RX ORDER — CARVEDILOL 6.25 MG/1
6.25 TABLET ORAL 2 TIMES DAILY WITH MEALS
Qty: 60 TABLET | Refills: 3 | Status: SHIPPED | OUTPATIENT
Start: 2017-08-31 | End: 2017-12-01

## 2017-09-07 ENCOUNTER — DOCUMENTATION ONLY (OUTPATIENT)
Dept: CARDIOLOGY | Facility: CLINIC | Age: 52
End: 2017-09-07

## 2017-09-07 NOTE — PROGRESS NOTES
[facility]. He had [employer] call us to see what they needed from us. A voicemail was left from Marylu from [employer], requesting the form and recent clinic note,studies and labs. I faxed the form and clinic notes/echo/labs to [phone]. Naye

## 2017-12-01 DIAGNOSIS — I50.9 CHF (CONGESTIVE HEART FAILURE) (H): Primary | ICD-10-CM

## 2017-12-01 RX ORDER — CARVEDILOL 6.25 MG/1
6.25 TABLET ORAL 2 TIMES DAILY WITH MEALS
Qty: 180 TABLET | Refills: 1 | Status: SHIPPED | OUTPATIENT
Start: 2017-12-01 | End: 2018-02-16

## 2017-12-01 RX ORDER — CARVEDILOL 12.5 MG/1
12.5 TABLET ORAL 2 TIMES DAILY WITH MEALS
Qty: 180 TABLET | Refills: 1 | Status: SHIPPED | OUTPATIENT
Start: 2017-12-01 | End: 2018-02-16

## 2017-12-01 RX ORDER — LISINOPRIL 5 MG/1
5 TABLET ORAL 2 TIMES DAILY
Qty: 180 TABLET | Refills: 1 | Status: SHIPPED | OUTPATIENT
Start: 2017-12-01 | End: 2018-08-09

## 2018-02-12 NOTE — PROGRESS NOTES
Alfredo Darling MD Gaustad, Kristine        Cc: Erlinda Dumont                     MRA of aorta     Core protocol for cardiomyopathy

## 2018-02-13 ENCOUNTER — HOSPITAL ENCOUNTER (OUTPATIENT)
Dept: CARDIOLOGY | Facility: CLINIC | Age: 53
Discharge: HOME | End: 2018-02-13
Attending: INTERNAL MEDICINE | Admitting: INTERNAL MEDICINE
Payer: COMMERCIAL

## 2018-02-13 DIAGNOSIS — I42.8 NONISCHEMIC CARDIOMYOPATHY (H): ICD-10-CM

## 2018-02-13 PROCEDURE — 71555 MRI ANGIO CHEST W OR W/O DYE: CPT

## 2018-02-13 PROCEDURE — A9585 GADOBUTROL INJECTION: HCPCS | Performed by: INTERNAL MEDICINE

## 2018-02-13 PROCEDURE — 25000128 H RX IP 250 OP 636: Performed by: INTERNAL MEDICINE

## 2018-02-13 PROCEDURE — 75561 CARDIAC MRI FOR MORPH W/DYE: CPT | Mod: 26 | Performed by: INTERNAL MEDICINE

## 2018-02-13 PROCEDURE — 71555 MRI ANGIO CHEST W OR W/O DYE: CPT | Mod: 26 | Performed by: INTERNAL MEDICINE

## 2018-02-13 PROCEDURE — 75561 CARDIAC MRI FOR MORPH W/DYE: CPT

## 2018-02-13 RX ORDER — GADOBUTROL 604.72 MG/ML
5-65 INJECTION INTRAVENOUS ONCE
Status: COMPLETED | OUTPATIENT
Start: 2018-02-13 | End: 2018-02-13

## 2018-02-13 RX ADMIN — GADOBUTROL 18 ML: 604.72 INJECTION INTRAVENOUS at 14:59

## 2018-02-15 DIAGNOSIS — E78.5 HYPERLIPIDEMIA WITH TARGET LDL LESS THAN 100: ICD-10-CM

## 2018-02-15 DIAGNOSIS — I10 ESSENTIAL HYPERTENSION, BENIGN: ICD-10-CM

## 2018-02-15 LAB
ALT SERPL W P-5'-P-CCNC: <5 U/L (ref 5–30)
ANION GAP SERPL CALCULATED.3IONS-SCNC: 14 MMOL/L (ref 6–17)
BUN SERPL-MCNC: 15 MG/DL (ref 7–30)
CALCIUM SERPL-MCNC: 9.2 MG/DL (ref 8.5–10.5)
CHLORIDE SERPL-SCNC: 104 MMOL/L (ref 98–107)
CHOLEST SERPL-MCNC: 153 MG/DL
CO2 SERPL-SCNC: 27 MMOL/L (ref 23–29)
CREAT SERPL-MCNC: 1.29 MG/DL (ref 0.7–1.3)
GFR SERPL CREATININE-BSD FRML MDRD: 58 ML/MIN/1.7M2
GLUCOSE SERPL-MCNC: 95 MG/DL (ref 70–105)
HDLC SERPL-MCNC: 38 MG/DL
LDLC SERPL CALC-MCNC: 95 MG/DL
NONHDLC SERPL-MCNC: 115 MG/DL
POTASSIUM SERPL-SCNC: 4 MMOL/L (ref 3.5–5.1)
SODIUM SERPL-SCNC: 141 MMOL/L (ref 136–145)
TRIGL SERPL-MCNC: 100 MG/DL

## 2018-02-15 PROCEDURE — 36415 COLL VENOUS BLD VENIPUNCTURE: CPT | Performed by: INTERNAL MEDICINE

## 2018-02-15 PROCEDURE — 80048 BASIC METABOLIC PNL TOTAL CA: CPT | Performed by: INTERNAL MEDICINE

## 2018-02-15 PROCEDURE — 80061 LIPID PANEL: CPT | Performed by: INTERNAL MEDICINE

## 2018-02-15 PROCEDURE — 84460 ALANINE AMINO (ALT) (SGPT): CPT | Performed by: INTERNAL MEDICINE

## 2018-02-16 ENCOUNTER — OFFICE VISIT (OUTPATIENT)
Dept: CARDIOLOGY | Facility: CLINIC | Age: 53
End: 2018-02-16
Attending: INTERNAL MEDICINE
Payer: COMMERCIAL

## 2018-02-16 VITALS
BODY MASS INDEX: 27.43 KG/M2 | WEIGHT: 191.6 LBS | HEART RATE: 64 BPM | SYSTOLIC BLOOD PRESSURE: 138 MMHG | HEIGHT: 70 IN | DIASTOLIC BLOOD PRESSURE: 97 MMHG

## 2018-02-16 DIAGNOSIS — I50.22 CHRONIC SYSTOLIC CONGESTIVE HEART FAILURE (H): ICD-10-CM

## 2018-02-16 DIAGNOSIS — E78.5 HYPERLIPIDEMIA WITH TARGET LDL LESS THAN 100: ICD-10-CM

## 2018-02-16 DIAGNOSIS — I42.8 NONISCHEMIC CARDIOMYOPATHY (H): Primary | ICD-10-CM

## 2018-02-16 DIAGNOSIS — I10 ESSENTIAL HYPERTENSION, BENIGN: ICD-10-CM

## 2018-02-16 DIAGNOSIS — I44.7 LBBB (LEFT BUNDLE BRANCH BLOCK): ICD-10-CM

## 2018-02-16 DIAGNOSIS — I77.810 MILD ASCENDING AORTA DILATION (H): ICD-10-CM

## 2018-02-16 PROCEDURE — 99214 OFFICE O/P EST MOD 30 MIN: CPT | Performed by: INTERNAL MEDICINE

## 2018-02-16 RX ORDER — CARVEDILOL 25 MG/1
25 TABLET ORAL 2 TIMES DAILY WITH MEALS
Qty: 180 TABLET | Refills: 3 | Status: SHIPPED | OUTPATIENT
Start: 2018-02-16 | End: 2019-02-05

## 2018-02-16 NOTE — PROGRESS NOTES
Service Date: 02/16/2018      HISTORY OF PRESENT ILLNESS:  I had the opportunity to see Mr. Orlin Griggs in Cardiology Clinic today at the [facility] in [place] for re-evaluation of nonischemic cardiomyopathy and mild ascending root dilation.  As you recall, Mr. Griggs is a 52-year-old gentleman who was found to have a mild nonischemic cardiomyopathy with an ejection fraction initially of 30-35% by echocardiogram when it was diagnosed in 2015.  His coronary arteries were found to be normal by CT coronary angiogram.  Fortunately, with medical therapy including carvedilol and lisinopril, his ejection fraction has improved significantly and essentially normalized.      We repeated evaluation of his cardiomyopathy with a cardiac MRI this year in order to more carefully quantify his left ventricular systolic function.  His ejection fraction was measured at 54%, which is exactly the same as it was a year ago.  His right ventricular systolic function was normal as well.  Measured right ventricular ejection fraction was slightly lower, but still in the normal range.  His ascending aorta was measured up to a maximum of 4.0 cm which is exactly the same as last year as well.      He continues to be high anxious about his cardiac condition and very concerned about any slight deviation in the numbers.  I reassured him that his MRI looks quite normal and that I am not concerned about any change in the measurement of his right ventricular ejection fraction.      His only complaint today is of some right flank pain which seems to be intermittent, most consistent with a musculoskeletal cause.  He has a lot of other concerns about possible kidney stones and malignancies, but I tried to reassure him that this is unlikely to be anything but muscular in nature.  I recommended that he consider doing some massage therapy.      I evaluated his laboratory studies again including a basic metabolic panel which is normal  and fasting lipid panel.  His HDL remains slightly low at 38, but his LDL is excellent at 95 and triglycerides normal at 100.      His blood pressure has been running a bit high recently for unclear reasons.  We did decrease his carvedilol in the past from 25 b.i.d. to 18.75 b.i.d.  Today, his blood pressure was 138/97, heart rate 64 and weight 191 pounds.  His lungs are clear.  His heart rhythm is regular.  He has no cardiac murmurs or carotid bruits and no edema.      IMPRESSIONS:  Mr. Orlin Griggs is a 52-year-old gentleman with a history of moderate nonischemic cardiomyopathy which has resolved with medical therapy.  He now has normal left ventricular systolic function.  He has a very slight dilation of the ascending aorta as well which is also quite stable.  I recommended continuing his medical therapy.  I will increase his carvedilol back to 25 mg twice a day.  His increased blood pressures may be due to his back pain or some other cause, but I think keeping those under tight control would be beneficial, especially for his ascending aorta.      I will plan to see him back again in 6 months at his request and have him follow up with you for further discussion of his back pain problems.      cc:   [place] [address]         MEENA AGGARWAL MD, [facility]             D: 2018   T: 2018   MT: YUNIOR      Name:     ORLIN GRIGGS   MRN:      [id number]        Account:      [id number]   :      1965           Service Date: 2018      Document: [id number]

## 2018-02-16 NOTE — PROGRESS NOTES
HPI and Plan:   See dictation    Orders Placed This Encounter   Procedures     MRI Cardiac w/contrast     Basic metabolic panel     Lipid Profile     ALT     Follow-Up with Cardiologist       Orders Placed This Encounter   Medications     carvedilol (COREG) 25 MG tablet     Sig: Take 1 tablet (25 mg) by mouth 2 times daily (with meals)     Dispense:  180 tablet     Refill:  3       Medications Discontinued During This Encounter   Medication Reason     carvedilol (COREG) 12.5 MG tablet Reorder     carvedilol (COREG) 6.25 MG tablet          Encounter Diagnoses   Name Primary?     Nonischemic cardiomyopathy (H) Yes     LBBB (left bundle branch block)      Hyperlipidemia with target LDL less than 100      Essential hypertension, benign      Mild ascending aorta dilation (H)      Chronic systolic congestive heart failure (H)        CURRENT MEDICATIONS:  Current Outpatient Prescriptions   Medication Sig Dispense Refill     carvedilol (COREG) 25 MG tablet Take 1 tablet (25 mg) by mouth 2 times daily (with meals) 180 tablet 3     lisinopril (PRINIVIL/ZESTRIL) 5 MG tablet Take 1 tablet (5 mg) by mouth 2 times daily 180 tablet 1     [DISCONTINUED] carvedilol (COREG) 6.25 MG tablet Take 1 tablet (6.25 mg) by mouth 2 times daily (with meals) 180 tablet 1     [DISCONTINUED] carvedilol (COREG) 12.5 MG tablet Take 1 tablet (12.5 mg) by mouth 2 times daily (with meals) 180 tablet 1       ALLERGIES   No Known Allergies    PAST MEDICAL HISTORY:  Past Medical History:   Diagnosis Date     Acute renal insufficiency      Anxiety      CHF (congestive heart failure), NYHA class I (H) 02/20/2015    2/21/15 Echo shows EF 30-35%, 4/14/15 Echo shows EF improved to 43-46%     Essential hypertension, benign 7/29/2015     History of cocaine use     prior     Hyperlipidemia LDL goal < 100      LBBB (left bundle branch block)      Mild ascending aorta dilation (H) 3/9/2016    3.9cm per echo 3/9/16      Nonischemic cardiomyopathy (H)     idiopathic  "nonischemic cardiopmyopathy; 3/9/16 Echo:  EF 45-50%; 2/21/15: EF 30-35%, 4/14/15: EF improved to 43-46%.          PAST SURGICAL HISTORY:  Past Surgical History:   Procedure Laterality Date     CHOLECYSTECTOMY         FAMILY HISTORY:  Family History   Problem Relation Age of Onset     Breast Cancer Mother      CANCER Father      HEART DISEASE Brother      pacemaker     Pacemaker Brother      Family History Negative Brother      Family History Negative Brother      Coronary Artery Disease No family hx of      DIABETES No family hx of        SOCIAL HISTORY:  Social History     Social History     Marital status: Single     Spouse name: N/A     Number of children: N/A     Years of education: N/A     Social History Main Topics     Smoking status: Never Smoker     Smokeless tobacco: Never Used     Alcohol use No     Drug use: No     Sexual activity: Not Asked     Other Topics Concern     Parent/Sibling W/ Cabg, Mi Or Angioplasty Before 65f 55m? No     Caffeine Concern No     none     Sleep Concern No     Weight Concern Yes     weight loss     Special Diet Yes     watched his sodium intake     Exercise Yes     daily      Social History Narrative       Review of Systems:  Skin:  Negative       Eyes:  Positive for glasses    ENT:  Negative      Respiratory:  Negative       Cardiovascular:  Negative;palpitations;chest pain;edema;syncope or near-syncope;dizziness;lightheadedness;cyanosis;fatigue;exercise intolerance Positive for energy level improved  Gastroenterology:        Genitourinary:  Negative      Musculoskeletal:  Positive for back pain    Neurologic:  Negative      Psychiatric:  Negative      Heme/Lymph/Imm:  Positive for weight loss    Endocrine:  Negative        Physical Exam:  Vitals: BP (!) 138/97 (BP Location: Right arm, Cuff Size: Adult Large)  Pulse 64  Ht 1.778 m (5' 10\")  Wt 86.9 kg (191 lb 9.6 oz)  BMI 27.49 kg/m2    Constitutional:  cooperative, alert and oriented, well developed, well nourished, in no " acute distress        Skin:  warm and dry to the touch, no apparent skin lesions or masses noted          Head:  normocephalic, no masses or lesions        Eyes:  pupils equal and round        Lymph:      ENT:  no pallor or cyanosis, dentition good        Neck:  carotid pulses are full and equal bilaterally, JVP normal, no carotid bruit        Respiratory:  clear to auscultation         Cardiac: regular rhythm, normal S1/S2, no S3 or S4, apical impulse not displaced, no murmurs, gallops or rubs                pulses full and equal                                        GI:  BS normoactive;abdomen soft        Extremities and Muscular Skeletal:  no edema;no deformities, clubbing, cyanosis, erythema observed              Neurological:  no gross motor deficits;affect appropriate        Psych:  Alert and Oriented x 3        CC  Jose Mata MD  [address]  [place] [address]

## 2018-02-16 NOTE — LETTER
2/16/2018    Lyndon Steve MD  [address] [facility] [address]    RE: Orlin Griggs       Dear Colleague,    I had the pleasure of seeing Orlin Griggs in the [facility].    HPI and Plan:   See dictation    Orders Placed This Encounter   Procedures     MRI Cardiac w/contrast     Basic metabolic panel     Lipid Profile     ALT     Follow-Up with Cardiologist       Orders Placed This Encounter   Medications     carvedilol (COREG) 25 MG tablet     Sig: Take 1 tablet (25 mg) by mouth 2 times daily (with meals)     Dispense:  180 tablet     Refill:  3       Medications Discontinued During This Encounter   Medication Reason     carvedilol (COREG) 12.5 MG tablet Reorder     carvedilol (COREG) 6.25 MG tablet          Encounter Diagnoses   Name Primary?     Nonischemic cardiomyopathy (H) Yes     LBBB (left bundle branch block)      Hyperlipidemia with target LDL less than 100      Essential hypertension, benign      Mild ascending aorta dilation (H)      Chronic systolic congestive heart failure (H)        CURRENT MEDICATIONS:  Current Outpatient Prescriptions   Medication Sig Dispense Refill     carvedilol (COREG) 25 MG tablet Take 1 tablet (25 mg) by mouth 2 times daily (with meals) 180 tablet 3     lisinopril (PRINIVIL/ZESTRIL) 5 MG tablet Take 1 tablet (5 mg) by mouth 2 times daily 180 tablet 1     [DISCONTINUED] carvedilol (COREG) 6.25 MG tablet Take 1 tablet (6.25 mg) by mouth 2 times daily (with meals) 180 tablet 1     [DISCONTINUED] carvedilol (COREG) 12.5 MG tablet Take 1 tablet (12.5 mg) by mouth 2 times daily (with meals) 180 tablet 1       ALLERGIES   No Known Allergies    PAST MEDICAL HISTORY:  Past Medical History:   Diagnosis Date     Acute renal insufficiency      Anxiety      CHF (congestive heart failure), NYHA class I (H) 02/20/2015    2/21/15 Echo shows EF 30-35%, 4/14/15 Echo shows EF improved to 43-46%     Essential hypertension, benign 7/29/2015     History  of cocaine use     prior     Hyperlipidemia LDL goal < 100      LBBB (left bundle branch block)      Mild ascending aorta dilation (H) 3/9/2016    3.9cm per echo 3/9/16      Nonischemic cardiomyopathy (H)     idiopathic nonischemic cardiopmyopathy; 3/9/16 Echo:  EF 45-50%; 2/21/15: EF 30-35%, 4/14/15: EF improved to 43-46%.          PAST SURGICAL HISTORY:  Past Surgical History:   Procedure Laterality Date     CHOLECYSTECTOMY         FAMILY HISTORY:  Family History   Problem Relation Age of Onset     Breast Cancer Mother      CANCER Father      HEART DISEASE Brother      pacemaker     Pacemaker Brother      Family History Negative Brother      Family History Negative Brother      Coronary Artery Disease No family hx of      DIABETES No family hx of        SOCIAL HISTORY:  Social History     Social History     Marital status: Single     Spouse name: N/A     Number of children: N/A     Years of education: N/A     Social History Main Topics     Smoking status: Never Smoker     Smokeless tobacco: Never Used     Alcohol use No     Drug use: No     Sexual activity: Not Asked     Other Topics Concern     Parent/Sibling W/ Cabg, Mi Or Angioplasty Before 65f 55m? No     Caffeine Concern No     none     Sleep Concern No     Weight Concern Yes     weight loss     Special Diet Yes     watched his sodium intake     Exercise Yes     daily      Social History Narrative       Review of Systems:  Skin:  Negative       Eyes:  Positive for glasses    ENT:  Negative      Respiratory:  Negative       Cardiovascular:  Negative;palpitations;chest pain;edema;syncope or near-syncope;dizziness;lightheadedness;cyanosis;fatigue;exercise intolerance Positive for energy level improved  Gastroenterology:        Genitourinary:  Negative      Musculoskeletal:  Positive for back pain    Neurologic:  Negative      Psychiatric:  Negative      Heme/Lymph/Imm:  Positive for weight loss    Endocrine:  Negative        Physical Exam:  Vitals: BP (!) 138/97  "(BP Location: Right arm, Cuff Size: Adult Large)  Pulse 64  Ht 1.778 m (5' 10\")  Wt 86.9 kg (191 lb 9.6 oz)  BMI 27.49 kg/m2    Constitutional:  cooperative, alert and oriented, well developed, well nourished, in no acute distress        Skin:  warm and dry to the touch, no apparent skin lesions or masses noted          Head:  normocephalic, no masses or lesions        Eyes:  pupils equal and round        Lymph:      ENT:  no pallor or cyanosis, dentition good        Neck:  carotid pulses are full and equal bilaterally, JVP normal, no carotid bruit        Respiratory:  clear to auscultation         Cardiac: regular rhythm, normal S1/S2, no S3 or S4, apical impulse not displaced, no murmurs, gallops or rubs                pulses full and equal                                        GI:  BS normoactive;abdomen soft        Extremities and Muscular Skeletal:  no edema;no deformities, clubbing, cyanosis, erythema observed              Neurological:  no gross motor deficits;affect appropriate        Psych:  Alert and Oriented x 3        CC  Meena Mata MD  [address]  [place] [address]                Thank you for allowing me to participate in the care of your patient.      Sincerely,     MEENA MATA MD     [facility]    cc:   Meena Mata MD  [address]  [place] [address]        "

## 2018-02-16 NOTE — LETTER
2/16/2018      Lyndon Steve MD  [address] [facility] [address]      RE: Orlin Griggs       Dear Colleague,    I had the pleasure of seeing Orlin Griggs in the [facility].    Service Date: 02/16/2018      HISTORY OF PRESENT ILLNESS:  I had the opportunity to see Mr. Orlin Griggs in Cardiology Clinic today at the [facility] in [place] for re-evaluation of nonischemic cardiomyopathy and mild ascending root dilation.  As you recall, Mr. Griggs is a 52-year-old gentleman who was found to have a mild nonischemic cardiomyopathy with an ejection fraction initially of 30-35% by echocardiogram when it was diagnosed in 2015.  His coronary arteries were found to be normal by CT coronary angiogram.  Fortunately, with medical therapy including carvedilol and lisinopril, his ejection fraction has improved significantly and essentially normalized.      We repeated evaluation of his cardiomyopathy with a cardiac MRI this year in order to more carefully quantify his left ventricular systolic function.  His ejection fraction was measured at 54%, which is exactly the same as it was a year ago.  His right ventricular systolic function was normal as well.  Measured right ventricular ejection fraction was slightly lower, but still in the normal range.  His ascending aorta was measured up to a maximum of 4.0 cm which is exactly the same as last year as well.      He continues to be high anxious about his cardiac condition and very concerned about any slight deviation in the numbers.  I reassured him that his MRI looks quite normal and that I am not concerned about any change in the measurement of his right ventricular ejection fraction.      His only complaint today is of some right flank pain which seems to be intermittent, most consistent with a musculoskeletal cause.  He has a lot of other concerns about possible kidney stones and malignancies, but I tried to reassure  him that this is unlikely to be anything but muscular in nature.  I recommended that he consider doing some massage therapy.      I evaluated his laboratory studies again including a basic metabolic panel which is normal and fasting lipid panel.  His HDL remains slightly low at 38, but his LDL is excellent at 95 and triglycerides normal at 100.      His blood pressure has been running a bit high recently for unclear reasons.  We did decrease his carvedilol in the past from 25 b.i.d. to 18.75 b.i.d.  Today, his blood pressure was 138/97, heart rate 64 and weight 191 pounds.  His lungs are clear.  His heart rhythm is regular.  He has no cardiac murmurs or carotid bruits and no edema.      IMPRESSIONS:  Mr. Orlin Griggs is a 52-year-old gentleman with a history of moderate nonischemic cardiomyopathy which has resolved with medical therapy.  He now has normal left ventricular systolic function.  He has a very slight dilation of the ascending aorta as well which is also quite stable.  I recommended continuing his medical therapy.  I will increase his carvedilol back to 25 mg twice a day.  His increased blood pressures may be due to his back pain or some other cause, but I think keeping those under tight control would be beneficial, especially for his ascending aorta.      I will plan to see him back again in 6 months at his request and have him follow up with you for further discussion of his back pain problems.      cc:   Lyndon Steve MD   [address]  [address]         MEENA AGGARWAL MD, [facility]             D: 2018   T: 2018   MT: YUNIOR      Name:     ORLIN GRIGGS   MRN:      [id number]        Account:      [id number]   :      1965           Service Date: 2018      Document: [id number]         Outpatient Encounter Prescriptions as of 2018   Medication Sig Dispense Refill     carvedilol (COREG) 25 MG tablet Take 1 tablet (25 mg) by mouth 2  times daily (with meals) 180 tablet 3     lisinopril (PRINIVIL/ZESTRIL) 5 MG tablet Take 1 tablet (5 mg) by mouth 2 times daily 180 tablet 1     [DISCONTINUED] carvedilol (COREG) 6.25 MG tablet Take 1 tablet (6.25 mg) by mouth 2 times daily (with meals) 180 tablet 1     [DISCONTINUED] carvedilol (COREG) 12.5 MG tablet Take 1 tablet (12.5 mg) by mouth 2 times daily (with meals) 180 tablet 1     No facility-administered encounter medications on file as of 2/16/2018.        Again, thank you for allowing me to participate in the care of your patient.      Sincerely,    MEENA AGGARWAL MD     [facility]

## 2018-02-21 ENCOUNTER — DOCUMENTATION ONLY (OUTPATIENT)
Dept: CARDIOLOGY | Facility: CLINIC | Age: 53
End: 2018-02-21

## 2018-02-21 NOTE — PROGRESS NOTES
Received disability forms to be completed for patient from [employer].  Called pt - continues on lifting restriction of <50#.  His job requires he can lift 70# or > pt states.  Pt unable to perform his job at [employer] with this restriction per pt.  Will complete forms per Dr. Mata's recommendations.

## 2018-03-01 ENCOUNTER — CARE COORDINATION (OUTPATIENT)
Dept: CARDIOLOGY | Facility: CLINIC | Age: 53
End: 2018-03-01

## 2018-03-01 NOTE — PROGRESS NOTES
"Received message from patient stating he is concerned about his GFR on his last BMP (2/15). He said he has been watching his GFR gradually decline from 68, now down to 58. He is wondering if this is something he should be concerned with. He mentioned that a couple of months ago he had some kidney pain and has recently noticed that he has \"foamy urine\". He would like a call back to discuss his concern. I spoke with Lizy De La Vega RN. She will contact patient as she also to discuss his disability form with him. Subha Hutchison RN 3:07 PM 03/01/18      "

## 2018-03-01 NOTE — PROGRESS NOTES
Called pt back regarding BMP results -  GFR 58 but was 56 one year ago - both are stable and Dr. Mata has reviewed and felt they were satisfactory.

## 2018-05-01 NOTE — TELEPHONE ENCOUNTER
----- Message from Jose Mata MD sent at 2/7/2017 11:02 AM CST -----  Please get the records from his consult at [facility] in September. The address is in the notes section. Thanks. EE   Additional Progress Note...

## 2018-08-09 DIAGNOSIS — I50.9 CHF (CONGESTIVE HEART FAILURE) (H): ICD-10-CM

## 2018-08-09 RX ORDER — LISINOPRIL 5 MG/1
5 TABLET ORAL 2 TIMES DAILY
Qty: 180 TABLET | Refills: 0 | Status: SHIPPED | OUTPATIENT
Start: 2018-08-09 | End: 2018-09-26

## 2018-09-11 ENCOUNTER — HOSPITAL ENCOUNTER (OUTPATIENT)
Dept: CARDIOLOGY | Facility: CLINIC | Age: 53
Discharge: HOME | End: 2018-09-11
Attending: INTERNAL MEDICINE | Admitting: INTERNAL MEDICINE
Payer: COMMERCIAL

## 2018-09-11 ENCOUNTER — CARE COORDINATION (OUTPATIENT)
Dept: CARDIOLOGY | Facility: CLINIC | Age: 53
End: 2018-09-11

## 2018-09-11 DIAGNOSIS — I42.8 NONISCHEMIC CARDIOMYOPATHY (H): ICD-10-CM

## 2018-09-11 LAB
CREAT BLD-MCNC: 1.3 MG/DL (ref 0.66–1.25)
GFR SERPL CREATININE-BSD FRML MDRD: 58 ML/MIN/1.7M2

## 2018-09-11 PROCEDURE — 75561 CARDIAC MRI FOR MORPH W/DYE: CPT | Mod: 26 | Performed by: INTERNAL MEDICINE

## 2018-09-11 PROCEDURE — 82565 ASSAY OF CREATININE: CPT

## 2018-09-11 PROCEDURE — 25500064 ZZH RX 255 OP 636: Performed by: INTERNAL MEDICINE

## 2018-09-11 PROCEDURE — A9585 GADOBUTROL INJECTION: HCPCS | Performed by: INTERNAL MEDICINE

## 2018-09-11 PROCEDURE — 75561 CARDIAC MRI FOR MORPH W/DYE: CPT

## 2018-09-11 RX ORDER — DIPHENHYDRAMINE HYDROCHLORIDE 50 MG/ML
25-50 INJECTION INTRAMUSCULAR; INTRAVENOUS
Status: DISCONTINUED | OUTPATIENT
Start: 2018-09-11 | End: 2018-09-12 | Stop reason: HOSPADM

## 2018-09-11 RX ORDER — ONDANSETRON 2 MG/ML
4 INJECTION INTRAMUSCULAR; INTRAVENOUS
Status: DISCONTINUED | OUTPATIENT
Start: 2018-09-11 | End: 2018-09-12 | Stop reason: HOSPADM

## 2018-09-11 RX ORDER — DIAZEPAM 5 MG
5 TABLET ORAL EVERY 30 MIN PRN
Status: DISCONTINUED | OUTPATIENT
Start: 2018-09-11 | End: 2018-09-12 | Stop reason: HOSPADM

## 2018-09-11 RX ORDER — ACYCLOVIR 200 MG/1
0-1 CAPSULE ORAL
Status: DISCONTINUED | OUTPATIENT
Start: 2018-09-11 | End: 2018-09-12 | Stop reason: HOSPADM

## 2018-09-11 RX ORDER — DIPHENHYDRAMINE HCL 25 MG
25 CAPSULE ORAL
Status: DISCONTINUED | OUTPATIENT
Start: 2018-09-11 | End: 2018-09-12 | Stop reason: HOSPADM

## 2018-09-11 RX ORDER — METHYLPREDNISOLONE SODIUM SUCCINATE 125 MG/2ML
125 INJECTION, POWDER, LYOPHILIZED, FOR SOLUTION INTRAMUSCULAR; INTRAVENOUS
Status: DISCONTINUED | OUTPATIENT
Start: 2018-09-11 | End: 2018-09-12 | Stop reason: HOSPADM

## 2018-09-11 RX ORDER — GADOBUTROL 604.72 MG/ML
5-65 INJECTION INTRAVENOUS ONCE
Status: COMPLETED | OUTPATIENT
Start: 2018-09-11 | End: 2018-09-11

## 2018-09-11 RX ADMIN — GADOBUTROL 11 ML: 604.72 INJECTION INTRAVENOUS at 15:33

## 2018-09-11 NOTE — PROGRESS NOTES
Pt presented to clinic today with request that Dr. Mata provide cardiac work clearance letter for a new job he was offered with [employer]. Dr. Mata to review further and make determination. Will contact pt once task is completed, either way.

## 2018-09-12 NOTE — PROGRESS NOTES
2018         Oliver MARLO Chakraborty   [employer]     [facility] [employer]      RE: Orlin Paniagua    MRN: [id number]   : 1965      Dear Mr. Chakraborty:      Mr. 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  Mr. Paniagua has supplied me with the written job description which I have reviewed.  Based on my assessment of his medical condition, he would be appropriate to perform all of the jobs listed in that description without medical restrictions.      I would be happy to discuss this further or supply additional information as required.      Sincerely,         Meena Aggarwal MD, FACC   Cardiologist   [facility]         Sincerely,      MEENA AGGARWAL MD, [facility]             D: 2018   T: 2018   MT: YUNIOR      Name:     ORLIN PANIAGUA   MRN:      [id number]        Account:      [id number]   :      1965      Document: [id number]

## 2018-09-13 NOTE — PROGRESS NOTES
Dr. Mata has reviewed job description & dictated letter for patient regarding suitability for this position. Called pt to let him know the letter is at  he can come p/u at anytime.

## 2018-09-14 DIAGNOSIS — I42.8 NONISCHEMIC CARDIOMYOPATHY (H): ICD-10-CM

## 2018-09-14 LAB
ALT SERPL W P-5'-P-CCNC: 10 U/L (ref 5–30)
ANION GAP SERPL CALCULATED.3IONS-SCNC: 13 MMOL/L (ref 6–17)
BUN SERPL-MCNC: 20 MG/DL (ref 7–30)
CALCIUM SERPL-MCNC: 9.7 MG/DL (ref 8.5–10.5)
CHLORIDE SERPL-SCNC: 104 MMOL/L (ref 98–107)
CHOLEST SERPL-MCNC: 186 MG/DL
CO2 SERPL-SCNC: 27 MMOL/L (ref 23–29)
CREAT SERPL-MCNC: 1.43 MG/DL (ref 0.7–1.3)
GFR SERPL CREATININE-BSD FRML MDRD: 52 ML/MIN/1.7M2
GLUCOSE SERPL-MCNC: 107 MG/DL (ref 70–105)
HDLC SERPL-MCNC: 41 MG/DL
LDLC SERPL CALC-MCNC: 115 MG/DL
NONHDLC SERPL-MCNC: 145 MG/DL
POTASSIUM SERPL-SCNC: 4 MMOL/L (ref 3.5–5.1)
SODIUM SERPL-SCNC: 140 MMOL/L (ref 136–145)
TRIGL SERPL-MCNC: 151 MG/DL

## 2018-09-14 PROCEDURE — 36415 COLL VENOUS BLD VENIPUNCTURE: CPT | Performed by: INTERNAL MEDICINE

## 2018-09-14 PROCEDURE — 80048 BASIC METABOLIC PNL TOTAL CA: CPT | Performed by: INTERNAL MEDICINE

## 2018-09-14 PROCEDURE — 84460 ALANINE AMINO (ALT) (SGPT): CPT | Performed by: INTERNAL MEDICINE

## 2018-09-14 PROCEDURE — 80061 LIPID PANEL: CPT | Performed by: INTERNAL MEDICINE

## 2018-09-26 ENCOUNTER — OFFICE VISIT (OUTPATIENT)
Dept: CARDIOLOGY | Facility: CLINIC | Age: 53
End: 2018-09-26
Attending: INTERNAL MEDICINE
Payer: COMMERCIAL

## 2018-09-26 VITALS
SYSTOLIC BLOOD PRESSURE: 146 MMHG | OXYGEN SATURATION: 99 % | DIASTOLIC BLOOD PRESSURE: 100 MMHG | BODY MASS INDEX: 29.25 KG/M2 | HEART RATE: 60 BPM | HEIGHT: 70 IN | WEIGHT: 204.3 LBS

## 2018-09-26 DIAGNOSIS — E78.5 HYPERLIPIDEMIA WITH TARGET LDL LESS THAN 100: ICD-10-CM

## 2018-09-26 DIAGNOSIS — I42.8 NONISCHEMIC CARDIOMYOPATHY (H): Primary | ICD-10-CM

## 2018-09-26 DIAGNOSIS — I10 ESSENTIAL HYPERTENSION, BENIGN: ICD-10-CM

## 2018-09-26 DIAGNOSIS — I44.7 LBBB (LEFT BUNDLE BRANCH BLOCK): ICD-10-CM

## 2018-09-26 PROCEDURE — 99214 OFFICE O/P EST MOD 30 MIN: CPT | Performed by: INTERNAL MEDICINE

## 2018-09-26 NOTE — PROGRESS NOTES
HPI and Plan:   See dictation    Orders Placed This Encounter   Procedures     Basic metabolic panel     Lipid Profile     ALT     Lipid Profile     ALT     Basic metabolic panel     Follow-Up with Cardiologist       No orders of the defined types were placed in this encounter.      Medications Discontinued During This Encounter   Medication Reason     lisinopril (PRINIVIL/ZESTRIL) 5 MG tablet          Encounter Diagnoses   Name Primary?     Nonischemic cardiomyopathy (H) Yes     LBBB (left bundle branch block)      Hyperlipidemia with target LDL less than 100      Essential hypertension, benign        CURRENT MEDICATIONS:  Current Outpatient Prescriptions   Medication Sig Dispense Refill     carvedilol (COREG) 25 MG tablet Take 1 tablet (25 mg) by mouth 2 times daily (with meals) 180 tablet 3       ALLERGIES   No Known Allergies    PAST MEDICAL HISTORY:  Past Medical History:   Diagnosis Date     Acute renal insufficiency      Anxiety      CHF (congestive heart failure), NYHA class I (H) 02/20/2015    2/21/15 Echo shows EF 30-35%, 4/14/15 Echo shows EF improved to 43-46%     Essential hypertension, benign 7/29/2015     History of cocaine use     prior     Hyperlipidemia LDL goal < 100      LBBB (left bundle branch block)      Mild ascending aorta dilation (H) 3/9/2016    3.9cm per echo 3/9/16      Nonischemic cardiomyopathy (H)     idiopathic nonischemic cardiopmyopathy; 3/9/16 Echo:  EF 45-50%; 2/21/15: EF 30-35%, 4/14/15: EF improved to 43-46%.          PAST SURGICAL HISTORY:  Past Surgical History:   Procedure Laterality Date     CHOLECYSTECTOMY         FAMILY HISTORY:  Family History   Problem Relation Age of Onset     Breast Cancer Mother      Cancer Father      HEART DISEASE Brother      pacemaker     Pacemaker Brother      Family History Negative Brother      Family History Negative Brother      Coronary Artery Disease No family hx of      Diabetes No family hx of        SOCIAL HISTORY:  Social History  "    Social History     Marital status: Single     Spouse name: N/A     Number of children: N/A     Years of education: N/A     Social History Main Topics     Smoking status: Never Smoker     Smokeless tobacco: Never Used     Alcohol use Yes      Comment: occ. drink     Drug use: No     Sexual activity: Not Asked     Other Topics Concern     Parent/Sibling W/ Cabg, Mi Or Angioplasty Before 65f 55m? No     Caffeine Concern No     none     Sleep Concern No     Weight Concern Yes     weight loss     Special Diet Yes     watched his sodium intake     Exercise Yes     daily      Social History Narrative       Review of Systems:  Skin:  Negative       Eyes:  Negative      ENT:  Negative      Respiratory:  Negative       Cardiovascular:  Negative;palpitations;chest pain;edema;syncope or near-syncope;cyanosis;exercise intolerance;lightheadedness;dizziness;fatigue      Gastroenterology: Negative      Genitourinary:  Negative      Musculoskeletal:  Negative      Neurologic:  Negative      Psychiatric:  Negative      Heme/Lymph/Imm:  Negative      Endocrine:  Negative        Physical Exam:  Vitals: BP (!) 146/100 (BP Location: Left arm, Cuff Size: Adult Large)  Pulse 60  Ht 1.778 m (5' 10\")  Wt 92.7 kg (204 lb 4.8 oz)  SpO2 99%  BMI 29.31 kg/m2    Constitutional:  cooperative, alert and oriented, well developed, well nourished, in no acute distress        Skin:  warm and dry to the touch, no apparent skin lesions or masses noted          Head:  normocephalic, no masses or lesions        Eyes:  pupils equal and round        Lymph:      ENT:  no pallor or cyanosis, dentition good        Neck:  carotid pulses are full and equal bilaterally, JVP normal, no carotid bruit        Respiratory:  clear to auscultation         Cardiac: regular rhythm, normal S1/S2, no S3 or S4, apical impulse not displaced, no murmurs, gallops or rubs                pulses full and equal                                        GI:  BS " normoactive;abdomen soft        Extremities and Muscular Skeletal:  no edema;no deformities, clubbing, cyanosis, erythema observed              Neurological:  no gross motor deficits;affect appropriate        Psych:  Alert and Oriented x 3        CC  Jose Mata MD  [id number]  [place] [address]

## 2018-09-26 NOTE — LETTER
9/26/2018      Lyndon Steve MD  [address] [facility] [address]      RE: Orlin Griggs       Dear Colleague,    I had the pleasure of seeing Orlin Griggs in the [facility].    Service Date: 09/26/2018      HISTORY OF PRESENT ILLNESS:  I had the opportunity to see Mr. Orlin Griggs in Cardiology Clinic today at the [facility] in [place] for reevaluation of nonischemic cardiomyopathy.  As you recall, Mr. Griggs is a 53-year-old gentleman who was diagnosed with a nonischemic cardiomyopathy in 2015 when his ejection fraction was 30%-35%.  A CT coronary angiogram confirmed that his coronary arteries were normal.  He was started on medical therapy including carvedilol and lisinopril, and his left ventricular ejection fraction has nearly normalized.  We have performed echocardiograms and cardiac MRIs for close monitoring of his left ventricular function, and his ejection fraction has stabilized at just under 55%.  His latest ejection fraction this year was 53%.  He has had mild dilation of the ascending aorta, measured at 4.0 cm.      He admits that he has been somewhat less conscientious about his exercise and diet program recently, and his cholesterol numbers seem to reflect that.  His total cholesterol is up slightly to 186, as is his LDL at 115 and triglycerides at 151.  His HDL is up nicely, though, to 41.  I also noted that his creatinine was up slightly to 1.43 with normal sodium and potassium levels.      PHYSICAL EXAMINATION:  On examination today his blood pressure is 146/100, heart rate 60 and weight 204 pounds.  His lungs are clear.  His heart rhythm is regular.  I hear no cardiac murmurs or carotid bruits.      IMPRESSIONS:  Mr. Orlin Griggs is a 53-year-old gentleman with nonischemic cardiomyopathy which has essentially resolved with medical therapy.  His ejection fraction this year is 53% by cardiac MRI.  He has a mildly dilated ascending  aorta and mild chronic kidney disease.  His creatinine is up slightly to 1.43, and I suggested that it might be due to lisinopril.  I think it would be reasonable since his left ventricular function has nearly normalized to discontinue lisinopril and see if his renal function normalizes.  I have also encouraged him to hydrate.  I will have him return in a month for another blood test and repeat his cholesterol numbers again at that time as well.      I will see him back in clinic in 6 months for further discussion.      cc:   [place] [address]         MEENA AGGARWAL MD, [facility]             D: 2018   T: 2018   MT: MANI      Name:     ISHA PANIAGUA   MRN:      [id number]        Account:      [id number]   :      1965           Service Date: 2018      Document: [id number]         Outpatient Encounter Prescriptions as of 2018   Medication Sig Dispense Refill     carvedilol (COREG) 25 MG tablet Take 1 tablet (25 mg) by mouth 2 times daily (with meals) 180 tablet 3     [DISCONTINUED] lisinopril (PRINIVIL/ZESTRIL) 5 MG tablet Take 1 tablet (5 mg) by mouth 2 times daily 180 tablet 0     No facility-administered encounter medications on file as of 2018.        Again, thank you for allowing me to participate in the care of your patient.      Sincerely,    MEENA AGGARWAL MD     [facility]

## 2018-09-26 NOTE — LETTER
9/26/2018    Lyndon Steve MD  [address] [facility] [address]    RE: Orlin Griggs       Dear Colleague,    I had the pleasure of seeing Orlin Griggs in the [facility].    HPI and Plan:   See dictation    Orders Placed This Encounter   Procedures     Basic metabolic panel     Lipid Profile     ALT     Lipid Profile     ALT     Basic metabolic panel     Follow-Up with Cardiologist       No orders of the defined types were placed in this encounter.      Medications Discontinued During This Encounter   Medication Reason     lisinopril (PRINIVIL/ZESTRIL) 5 MG tablet          Encounter Diagnoses   Name Primary?     Nonischemic cardiomyopathy (H) Yes     LBBB (left bundle branch block)      Hyperlipidemia with target LDL less than 100      Essential hypertension, benign        CURRENT MEDICATIONS:  Current Outpatient Prescriptions   Medication Sig Dispense Refill     carvedilol (COREG) 25 MG tablet Take 1 tablet (25 mg) by mouth 2 times daily (with meals) 180 tablet 3       ALLERGIES   No Known Allergies    PAST MEDICAL HISTORY:  Past Medical History:   Diagnosis Date     Acute renal insufficiency      Anxiety      CHF (congestive heart failure), NYHA class I (H) 02/20/2015    2/21/15 Echo shows EF 30-35%, 4/14/15 Echo shows EF improved to 43-46%     Essential hypertension, benign 7/29/2015     History of cocaine use     prior     Hyperlipidemia LDL goal < 100      LBBB (left bundle branch block)      Mild ascending aorta dilation (H) 3/9/2016    3.9cm per echo 3/9/16      Nonischemic cardiomyopathy (H)     idiopathic nonischemic cardiopmyopathy; 3/9/16 Echo:  EF 45-50%; 2/21/15: EF 30-35%, 4/14/15: EF improved to 43-46%.          PAST SURGICAL HISTORY:  Past Surgical History:   Procedure Laterality Date     CHOLECYSTECTOMY         FAMILY HISTORY:  Family History   Problem Relation Age of Onset     Breast Cancer Mother      Cancer Father      HEART DISEASE Brother       "pacemaker     Pacemaker Brother      Family History Negative Brother      Family History Negative Brother      Coronary Artery Disease No family hx of      Diabetes No family hx of        SOCIAL HISTORY:  Social History     Social History     Marital status: Single     Spouse name: N/A     Number of children: N/A     Years of education: N/A     Social History Main Topics     Smoking status: Never Smoker     Smokeless tobacco: Never Used     Alcohol use Yes      Comment: occ. drink     Drug use: No     Sexual activity: Not Asked     Other Topics Concern     Parent/Sibling W/ Cabg, Mi Or Angioplasty Before 65f 55m? No     Caffeine Concern No     none     Sleep Concern No     Weight Concern Yes     weight loss     Special Diet Yes     watched his sodium intake     Exercise Yes     daily      Social History Narrative       Review of Systems:  Skin:  Negative       Eyes:  Negative      ENT:  Negative      Respiratory:  Negative       Cardiovascular:  Negative;palpitations;chest pain;edema;syncope or near-syncope;cyanosis;exercise intolerance;lightheadedness;dizziness;fatigue      Gastroenterology: Negative      Genitourinary:  Negative      Musculoskeletal:  Negative      Neurologic:  Negative      Psychiatric:  Negative      Heme/Lymph/Imm:  Negative      Endocrine:  Negative        Physical Exam:  Vitals: BP (!) 146/100 (BP Location: Left arm, Cuff Size: Adult Large)  Pulse 60  Ht 1.778 m (5' 10\")  Wt 92.7 kg (204 lb 4.8 oz)  SpO2 99%  BMI 29.31 kg/m2    Constitutional:  cooperative, alert and oriented, well developed, well nourished, in no acute distress        Skin:  warm and dry to the touch, no apparent skin lesions or masses noted          Head:  normocephalic, no masses or lesions        Eyes:  pupils equal and round        Lymph:      ENT:  no pallor or cyanosis, dentition good        Neck:  carotid pulses are full and equal bilaterally, JVP normal, no carotid bruit        Respiratory:  clear to " auscultation         Cardiac: regular rhythm, normal S1/S2, no S3 or S4, apical impulse not displaced, no murmurs, gallops or rubs                pulses full and equal                                        GI:  BS normoactive;abdomen soft        Extremities and Muscular Skeletal:  no edema;no deformities, clubbing, cyanosis, erythema observed              Neurological:  no gross motor deficits;affect appropriate        Psych:  Alert and Oriented x 3        CC  Jose Mata MD  [address]  [place] [address]                Thank you for allowing me to participate in the care of your patient.      Sincerely,     JOSE MATA MD     [facility]    cc:   Jose Mata MD  [address]  [place] [address]

## 2018-09-26 NOTE — MR AVS SNAPSHOT
After Visit Summary   9/26/2018    Orlin Griggs    MRN: [id number]           Patient Information     Date Of Birth          1965        Visit Information        Provider Department      9/26/2018 3:45 PM Jose Mata MD [facility]        Today's Diagnoses     Nonischemic cardiomyopathy (H)    -  1    LBBB (left bundle branch block)        Hyperlipidemia with target LDL less than 100        Essential hypertension, benign           Follow-ups after your visit        Additional Services     Follow-Up with Cardiologist                 Future tests that were ordered for you today     Open Future Orders        Priority Expected Expires Ordered    Lipid Profile Routine 3/25/2019 9/26/2019 9/26/2018    ALT Routine 3/25/2019 9/26/2019 9/26/2018    Basic metabolic panel Routine 3/25/2019 9/26/2019 9/26/2018    Follow-Up with Cardiologist Routine 3/25/2019 9/26/2019 9/26/2018    Basic metabolic panel Routine 10/26/2018 9/26/2019 9/26/2018    Lipid Profile Routine 10/26/2018 9/26/2019 9/26/2018    ALT Routine 10/26/2018 9/26/2019 9/26/2018            Who to contact     If you have questions or need follow up information about today's clinic visit or your schedule please contact [facility] directly at [phone].  Normal or non-critical lab and imaging results will be communicated to you by MyChart, letter or phone within 4 business days after the clinic has received the results. If you do not hear from us within 7 days, please contact the clinic through MyChart or phone. If you have a critical or abnormal lab result, we will notify you by phone as soon as possible.  Submit refill requests through [employer] or call your pharmacy and they will forward the refill request to us. Please allow 3 business days for your refill to be completed.          Additional Information About Your Visit        Care EveryWhere ID     This is your  "Care EveryWhere ID. This could be used by other organizations to access your [facility] medical records  [id number]        Your Vitals Were     Pulse Height Pulse Oximetry BMI (Body Mass Index)          60 1.778 m (5' 10\") 99% 29.31 kg/m2         Blood Pressure from Last 3 Encounters:   09/26/18 (!) 146/100   02/16/18 (!) 138/97   08/23/17 128/86    Weight from Last 3 Encounters:   09/26/18 92.7 kg (204 lb 4.8 oz)   02/16/18 86.9 kg (191 lb 9.6 oz)   08/23/17 92.2 kg (203 lb 3.2 oz)              We Performed the Following     Follow-Up with Cardiologist          Today's Medication Changes          These changes are accurate as of 9/26/18  4:23 PM.  If you have any questions, ask your nurse or doctor.               Stop taking these medicines if you haven't already. Please contact your care team if you have questions.     lisinopril 5 MG tablet   Commonly known as:  PRINIVIL/ZESTRIL   Stopped by:  Jose Mata MD                    Primary Care Provider Office Phone # Fax #    Lyndon Tarun Steve [phone]       [address] [place] [address]        Equal Access to Services     LYNNE ARELLANO AH: Hadcheo barrera hadasho Soomaali, waaxda luqadaha, qaybta kaalmada adeegyada, julio peterson. So [facility] [phone].    ATENCIÓN: Si habla español, tiene a maldonado disposición servicios gratuitos de asistencia lingüística. Llame al [phone].    We comply with applicable federal civil rights laws and [place] laws. We do not discriminate on the basis of race, color, national origin, age, disability, sex, sexual orientation, or gender identity.            Thank you!     Thank you for choosing [facility]  for your care. Our goal is always to provide you with excellent care. Hearing back from our patients is one way we can continue to improve our services. Please take a few minutes to complete the written survey that you may receive in the " mail after your visit with us. Thank you!             Your Updated Medication List - Protect others around you: Learn how to safely use, store and throw away your medicines at www.disposemymeds.org.          This list is accurate as of 9/26/18  4:23 PM.  Always use your most recent med list.                   Brand Name Dispense Instructions for use Diagnosis    carvedilol 25 MG tablet    COREG    180 tablet    Take 1 tablet (25 mg) by mouth 2 times daily (with meals)    Chronic systolic congestive heart failure (H)

## 2018-09-27 NOTE — PROGRESS NOTES
Service Date: 09/26/2018      HISTORY OF PRESENT ILLNESS:  I had the opportunity to see Mr. Orlin Griggs in Cardiology Clinic today at the [facility] in [place] for reevaluation of nonischemic cardiomyopathy.  As you recall, Mr. Griggs is a 53-year-old gentleman who was diagnosed with a nonischemic cardiomyopathy in 2015 when his ejection fraction was 30%-35%.  A CT coronary angiogram confirmed that his coronary arteries were normal.  He was started on medical therapy including carvedilol and lisinopril, and his left ventricular ejection fraction has nearly normalized.  We have performed echocardiograms and cardiac MRIs for close monitoring of his left ventricular function, and his ejection fraction has stabilized at just under 55%.  His latest ejection fraction this year was 53%.  He has had mild dilation of the ascending aorta, measured at 4.0 cm.      He admits that he has been somewhat less conscientious about his exercise and diet program recently, and his cholesterol numbers seem to reflect that.  His total cholesterol is up slightly to 186, as is his LDL at 115 and triglycerides at 151.  His HDL is up nicely, though, to 41.  I also noted that his creatinine was up slightly to 1.43 with normal sodium and potassium levels.      PHYSICAL EXAMINATION:  On examination today his blood pressure is 146/100, heart rate 60 and weight 204 pounds.  His lungs are clear.  His heart rhythm is regular.  I hear no cardiac murmurs or carotid bruits.      IMPRESSIONS:  Mr. Orlin Griggs is a 53-year-old gentleman with nonischemic cardiomyopathy which has essentially resolved with medical therapy.  His ejection fraction this year is 53% by cardiac MRI.  He has a mildly dilated ascending aorta and mild chronic kidney disease.  His creatinine is up slightly to 1.43, and I suggested that it might be due to lisinopril.  I think it would be reasonable since his left ventricular function has nearly normalized to  discontinue lisinopril and see if his renal function normalizes.  I have also encouraged him to hydrate.  I will have him return in a month for another blood test and repeat his cholesterol numbers again at that time as well.      I will see him back in clinic in 6 months for further discussion.      cc:   Lyndon Steve MD    [facility]    [address]  [address]         MEENA AGGARWAL MD, [facility]             D: 2018   T: 2018   MT: MANI      Name:     ISHA PANIAGUA   MRN:      [id number]        Account:      [id number]   :      1965           Service Date: 2018      Document: [id number]

## 2018-10-23 ENCOUNTER — HOSPITAL ENCOUNTER (EMERGENCY)
Facility: CLINIC | Age: 53
Discharge: HOME | End: 2018-10-23
Attending: EMERGENCY MEDICINE | Admitting: EMERGENCY MEDICINE
Payer: COMMERCIAL

## 2018-10-23 VITALS
TEMPERATURE: 98.7 F | SYSTOLIC BLOOD PRESSURE: 152 MMHG | OXYGEN SATURATION: 99 % | RESPIRATION RATE: 16 BRPM | HEART RATE: 62 BPM | DIASTOLIC BLOOD PRESSURE: 86 MMHG | HEIGHT: 71 IN | WEIGHT: 195 LBS | BODY MASS INDEX: 27.3 KG/M2

## 2018-10-23 DIAGNOSIS — L02.01 FACIAL ABSCESS: ICD-10-CM

## 2018-10-23 LAB
ANION GAP SERPL CALCULATED.3IONS-SCNC: 4 MMOL/L (ref 3–14)
BASOPHILS # BLD AUTO: 0 10E9/L (ref 0–0.2)
BASOPHILS NFR BLD AUTO: 0.3 %
BUN SERPL-MCNC: 21 MG/DL (ref 7–30)
CALCIUM SERPL-MCNC: 8.7 MG/DL (ref 8.5–10.1)
CHLORIDE SERPL-SCNC: 106 MMOL/L (ref 94–109)
CO2 SERPL-SCNC: 29 MMOL/L (ref 20–32)
CREAT SERPL-MCNC: 1.22 MG/DL (ref 0.66–1.25)
DIFFERENTIAL METHOD BLD: NORMAL
EOSINOPHIL # BLD AUTO: 0.3 10E9/L (ref 0–0.7)
EOSINOPHIL NFR BLD AUTO: 4.3 %
ERYTHROCYTE [DISTWIDTH] IN BLOOD BY AUTOMATED COUNT: 13.4 % (ref 10–15)
GFR SERPL CREATININE-BSD FRML MDRD: 62 ML/MIN/1.7M2
GLUCOSE SERPL-MCNC: 101 MG/DL (ref 70–99)
HCT VFR BLD AUTO: 40.3 % (ref 40–53)
HGB BLD-MCNC: 13.7 G/DL (ref 13.3–17.7)
IMM GRANULOCYTES # BLD: 0 10E9/L (ref 0–0.4)
IMM GRANULOCYTES NFR BLD: 0 %
LYMPHOCYTES # BLD AUTO: 2.1 10E9/L (ref 0.8–5.3)
LYMPHOCYTES NFR BLD AUTO: 31.7 %
MCH RBC QN AUTO: 30.6 PG (ref 26.5–33)
MCHC RBC AUTO-ENTMCNC: 34 G/DL (ref 31.5–36.5)
MCV RBC AUTO: 90 FL (ref 78–100)
MONOCYTES # BLD AUTO: 0.6 10E9/L (ref 0–1.3)
MONOCYTES NFR BLD AUTO: 8.7 %
NEUTROPHILS # BLD AUTO: 3.7 10E9/L (ref 1.6–8.3)
NEUTROPHILS NFR BLD AUTO: 55 %
NRBC # BLD AUTO: 0 10*3/UL
NRBC BLD AUTO-RTO: 0 /100
PLATELET # BLD AUTO: 160 10E9/L (ref 150–450)
POTASSIUM SERPL-SCNC: 4.1 MMOL/L (ref 3.4–5.3)
RBC # BLD AUTO: 4.48 10E12/L (ref 4.4–5.9)
SODIUM SERPL-SCNC: 139 MMOL/L (ref 133–144)
WBC # BLD AUTO: 6.8 10E9/L (ref 4–11)

## 2018-10-23 PROCEDURE — 99283 EMERGENCY DEPT VISIT LOW MDM: CPT

## 2018-10-23 PROCEDURE — 80048 BASIC METABOLIC PNL TOTAL CA: CPT | Performed by: EMERGENCY MEDICINE

## 2018-10-23 PROCEDURE — 85025 COMPLETE CBC W/AUTO DIFF WBC: CPT | Performed by: EMERGENCY MEDICINE

## 2018-10-23 RX ORDER — SULFAMETHOXAZOLE/TRIMETHOPRIM 800-160 MG
1 TABLET ORAL 2 TIMES DAILY
Qty: 20 TABLET | Refills: 0 | Status: SHIPPED | OUTPATIENT
Start: 2018-10-23 | End: 2018-11-02

## 2018-10-23 ASSESSMENT — ENCOUNTER SYMPTOMS
FACIAL SWELLING: 1
CHILLS: 0
COLOR CHANGE: 1
WOUND: 1
FEVER: 0

## 2018-10-23 NOTE — DISCHARGE INSTRUCTIONS
Facial Cellulitis  Cellulitis is an infection of the deep layers of skin. A break in the skin, such as a cut or scratch, can let bacteria under the skin. It may also occur from an infected oil gland (pimple) or hair follicle. If the bacteria get to deep layers of the skin, it can be serious. If not treated, cellulitis can get into the bloodstream and lymph nodes. The infection can then spread throughout the body. This causes serious illness.  Cellulitis causes the affected skin to become red, swollen, warm, and sore. The reddened areas have a visible border. You may have a fever, chills, and pain.  Cellulitis is treated with antibiotics taken for 7 to 10 days. Symptoms should get better 1 to 2 days after treatment is started. Make sure to take all the antibiotics for the full number of days until they are gone. Keep taking the medication even if your symptoms go away.  Home care  Follow these tips:    Take all of the antibiotic medicine exactly as directed until it is gone. Don t miss any doses, especially during the first 7 days. Don t stop taking it when your symptoms get better.    Use a cool compress (face cloth soaked in cool water) on your face to help reduce swelling and pain.    You may use acetaminophen or ibuprofen to reduce pain. Don t use these if you have chronic liver or kidney disease, or ever had a stomach ulcer or gastrointestinal bleeding. Talk with your healthcare provider first.  Follow-up care  Follow up with your healthcare provider, or as advised. If your infection does not go away on the first antibiotic, your healthcare provider will prescribe a different one.  When to seek medical advice  Call your healthcare provider right away if any of these occur:    Fever higher of 100.4  F (38.0  C) or higher after 2 days on antibiotics    Red areas that spread    Swelling or pain that gets worse    Fluid leaking from the skin (pus)    An eyelid that swells shut or leaks fluid (pus)    Headache or  neck pain that gets worse    Unusual drowsiness or confusion    Convulsions (seizure)    Change in eyesight     Date Last Reviewed: 9/1/2016 2000-2017 [employer]. [address] [place] [address], [place] [address]. All rights reserved. This information is not intended as a substitute for professional medical care. Always follow your healthcare professional's instructions.

## 2018-10-23 NOTE — ED PROVIDER NOTES
"  History     Chief Complaint:  Facial swelling    HPI   Orlin Griggs is a 53 year old male who presents with facial swelling. The patient states that approximately 2 days ago he began to notice that he had a small pimple on the right side of his face. He thought this was a zit/ingrown hair from shaving, and waited for it to come to a head. However, this small bump has not and has become increasingly red and swollen surrounding the area. He notes [place] and may have rubbed this area with his gloved hands, causing some irritation and recently had oral sex with a new partner but otherwise has not had any further injury or trauma to the area. This morning he woke up to significant swelling to the face and comes here for evaluation. He denies fevers or other symptoms. He notes a recent screening visit for HIV which was negative. He otherwise denies other wounds.    Allergies:  No known drug allergies     Medications:    Coreg      Past Medical History:    Mild descending aorta dilatation  Hyperlipidemia  Hypertension  Anxiety  Nonischemic cardiomyopathy  Left bundle branch block  CHF    Past Surgical History:    Cholecystectomy    Family History:    Breast cancer  Cancer   Heart disease     Social History:  Smoking Status: Never Smoker  Alcohol Use: Yes, occasionally  Patient presents alone.      Review of Systems   Constitutional: Negative for chills and fever.   HENT: Positive for facial swelling.    Skin: Positive for color change and wound.   All other systems reviewed and are negative.      Physical Exam   First Vitals:  BP: 145/90  Pulse: 61  Resp: 15  Height: 180.3 cm (5' 11\")  Weight: 88.5 kg (195 lb)  SpO2: 100 %      Physical Exam  GENERAL: well developed, pleasant  HEAD: atraumatic  EYES: pupils reactive, extraocular muscles intact, conjunctivae normal  ENT:  mucus membranes moist. Area of erythema, tenderness, and fullness to the right lower lip/chin area. Intraorally " no abscess. Externally no discernable focus for I&D.  NECK:  trachea midline, normal range of motion  RESPIRATORY: no tachypnea, breath sounds clear to auscultation   CVS: normal S1/S2, no murmurs, intact distal pulses  ABDOMEN: soft, nontender, nondistention  MUSCULOSKELETAL: no deformities  SKIN: warm and dry, no acute rashes or ulceration  NEURO: GCS 15, cranial nerves intact, alert and oriented x3  PSYCH:  Mood/affect normal    Emergency Department Course     Laboratory:  CBC: WNL (WBC 6.8, HGB 13.7, )   BMP: Glucose 101 (H), o/w WNL (Creatinine 1.22)     Emergency Department Course:  Past medical records, nursing notes, and vitals reviewed.  0608: I performed an exam of the patient and obtained history, as documented above.      0715: I rechecked the patient. Findings and plan explained to the Patient. Patient discharged home with instructions regarding supportive care, medications, and reasons to return. The importance of close follow-up was reviewed.      Impression & Plan      Medical Decision Making:  Patient presents with area of redness and fullness to his right lower lip/chin that does not involve the vermilion border.  Certainly is tender and firm but searching for area of abscess that is amenable to I and D I am not finding this.  He does shave this area with a razor possibly was a source for infection.  Patient has no dental pain.  This looks to be a local source of either facial cellulitis or early abscess formation.  He has no history of MRSA.  Discussed outpatient management with him including warm packs and oral antibiotics.  He is to return if it does not improve.    Diagnosis:    ICD-10-CM    1. Facial abscess L02.01        Discharge Medications:  New Prescriptions    SULFAMETHOXAZOLE-TRIMETHOPRIM (BACTRIM DS) 800-160 MG PER TABLET    Take 1 tablet by mouth 2 times daily for 10 days       Charlie Griggs  10/23/2018    EMERGENCY DEPARTMENT  ICharlie, am serving as a scribe at  6:08 AM on 10/23/2018 to document services personally performed by Amado Mcneil MD based on my observations and the provider's statements to me.       Amado Mcneil MD  10/30/18 1947

## 2018-10-23 NOTE — ED AVS SNAPSHOT
Emergency Department    [address] [place] [address]    Phone:  [phone]    Fax:  [phone]                                       Orlin Griggs   MRN: [id number]    Department:   Emergency Department   Date of Visit:  10/23/2018           After Visit Summary Signature Page     I have received my discharge instructions, and my questions have been answered. I have discussed any challenges I see with this plan with the nurse or doctor.    ..........................................................................................................................................  Patient/Patient Representative Signature      ..........................................................................................................................................  Patient Representative Print Name and Relationship to Patient    ..................................................               ................................................  Date                                   Time    ..........................................................................................................................................  Reviewed by Signature/Title    ...................................................              ..............................................  Date                                               Time          22EPIC Rev 08/18

## 2018-10-23 NOTE — ED AVS SNAPSHOT
Emergency Department    [address] [facility] [address]    Phone:  [phone]    Fax:  [phone]                                       Orlin Griggs   MRN: [id number]    Department:   Emergency Department   Date of Visit:  10/23/2018           Patient Information     Date Of Birth          1965        Your diagnoses for this visit were:     Facial abscess        You were seen by Donald Barrett MD and Amado Mcneil MD.      Follow-up Information     Follow up with  Emergency Department.    Specialty:  EMERGENCY MEDICINE    Why:  If symptoms worsen    Contact information:    [address] [facility] [address] [phone]        Discharge Instructions         Facial Cellulitis  Cellulitis is an infection of the deep layers of skin. A break in the skin, such as a cut or scratch, can let bacteria under the skin. It may also occur from an infected oil gland (pimple) or hair follicle. If the bacteria get to deep layers of the skin, it can be serious. If not treated, cellulitis can get into the bloodstream and lymph nodes. The infection can then spread throughout the body. This causes serious illness.  Cellulitis causes the affected skin to become red, swollen, warm, and sore. The reddened areas have a visible border. You may have a fever, chills, and pain.  Cellulitis is treated with antibiotics taken for 7 to 10 days. Symptoms should get better 1 to 2 days after treatment is started. Make sure to take all the antibiotics for the full number of days until they are gone. Keep taking the medication even if your symptoms go away.  Home care  Follow these tips:    Take all of the antibiotic medicine exactly as directed until it is gone. Don t miss any doses, especially during the first 7 days. Don t stop taking it when your symptoms get better.    Use a cool compress (face cloth soaked in cool water) on your face to help reduce swelling and pain.    You may use  acetaminophen or ibuprofen to reduce pain. Don t use these if you have chronic liver or kidney disease, or ever had a stomach ulcer or gastrointestinal bleeding. Talk with your healthcare provider first.  Follow-up care  Follow up with your healthcare provider, or as advised. If your infection does not go away on the first antibiotic, your healthcare provider will prescribe a different one.  When to seek medical advice  Call your healthcare provider right away if any of these occur:    Fever higher of 100.4  F (38.0  C) or higher after 2 days on antibiotics    Red areas that spread    Swelling or pain that gets worse    Fluid leaking from the skin (pus)    An eyelid that swells shut or leaks fluid (pus)    Headache or neck pain that gets worse    Unusual drowsiness or confusion    Convulsions (seizure)    Change in eyesight     Date Last Reviewed: 9/1/2016 2000-2017 [employer]. [address] [place] [address]. All rights reserved. This information is not intended as a substitute for professional medical care. Always follow your healthcare professional's instructions.          Your next 10 appointments already scheduled     Oct 30, 2018  7:40 AM CDT   LAB with ERN[facility] LAB   [facility] [place] ([facility])    [address] [place] [address] [phone]           Please do not eat 10-12 hours before your appointment if you are coming in fasting for labs on lipids, cholesterol, or glucose (sugar). This does not apply to pregnant women. Water, hot tea and black coffee (with nothing added) are okay. Do not drink other fluids, diet soda or chew gum.              24 Hour Appointment Hotline       To make an appointment at any [facility], call [phone] ([phone]). If you don't have a family doctor or clinic, we will help you find one. [facility] are conveniently located to serve the needs of you and your  family.             Review of your medicines      START taking        Dose / Directions Last dose taken    sulfamethoxazole-trimethoprim 800-160 MG per tablet   Commonly known as:  BACTRIM DS   Dose:  1 tablet   Quantity:  20 tablet        Take 1 tablet by mouth 2 times daily for 10 days   Refills:  0          Our records show that you are taking the medicines listed below. If these are incorrect, please call your family doctor or clinic.        Dose / Directions Last dose taken    carvedilol 25 MG tablet   Commonly known as:  COREG   Dose:  25 mg   Quantity:  180 tablet        Take 1 tablet (25 mg) by mouth 2 times daily (with meals)   Refills:  3                Prescriptions were sent or printed at these locations (1 Prescription)                   Other Prescriptions                Printed at Department/Unit printer (1 of 1)         sulfamethoxazole-trimethoprim (BACTRIM DS) 800-160 MG per tablet                Procedures and tests performed during your visit     Basic metabolic panel    CBC with platelets + differential      Orders Needing Specimen Collection     None      Pending Results     No orders found from 10/21/2018 to 10/24/2018.            Pending Culture Results     No orders found from 10/21/2018 to 10/24/2018.            Pending Results Instructions     If you had any lab results that were not finalized at the time of your Discharge, you can call the ED Lab Result RN at [phone]. You will be contacted by this team for any positive Lab results or changes in treatment. The nurses are available 7 days a week from 10A to 6:30P.  You can leave a message 24 hours per day and they will return your call.        Test Results From Your Hospital Stay        10/23/2018  6:36 AM      Component Results     Component Value Ref Range & Units Status    Sodium 139 133 - 144 mmol/L Final    Potassium 4.1 3.4 - 5.3 mmol/L Final    Chloride 106 94 - 109 mmol/L Final    Carbon Dioxide 29 20 - 32 mmol/L Final    Anion  Gap 4 3 - 14 mmol/L Final    Glucose 101 (H) 70 - 99 mg/dL Final    Urea Nitrogen 21 7 - 30 mg/dL Final    Creatinine 1.22 0.66 - 1.25 mg/dL Final    GFR Estimate 62 >60 mL/min/1.7m2 Final    Non  GFR Calc    GFR Estimate If Black 75 >60 mL/min/1.7m2 Final    African American GFR Calc    Calcium 8.7 8.5 - 10.1 mg/dL Final         10/23/2018  6:19 AM      Component Results     Component Value Ref Range & Units Status    WBC 6.8 4.0 - 11.0 10e9/L Final    RBC Count 4.48 4.4 - 5.9 10e12/L Final    Hemoglobin 13.7 13.3 - 17.7 g/dL Final    Hematocrit 40.3 40.0 - 53.0 % Final    MCV 90 78 - 100 fl Final    MCH 30.6 26.5 - 33.0 pg Final    MCHC 34.0 31.5 - 36.5 g/dL Final    RDW 13.4 10.0 - 15.0 % Final    Platelet Count 160 150 - 450 10e9/L Final    Diff Method Automated Method  Final    % Neutrophils 55.0 % Final    % Lymphocytes 31.7 % Final    % Monocytes 8.7 % Final    % Eosinophils 4.3 % Final    % Basophils 0.3 % Final    % Immature Granulocytes 0.0 % Final    Nucleated RBCs 0 0 /100 Final    Absolute Neutrophil 3.7 1.6 - 8.3 10e9/L Final    Absolute Lymphocytes 2.1 0.8 - 5.3 10e9/L Final    Absolute Monocytes 0.6 0.0 - 1.3 10e9/L Final    Absolute Eosinophils 0.3 0.0 - 0.7 10e9/L Final    Absolute Basophils 0.0 0.0 - 0.2 10e9/L Final    Abs Immature Granulocytes 0.0 0 - 0.4 10e9/L Final    Absolute Nucleated RBC 0.0  Final                Clinical Quality Measure: Blood Pressure Screening     Your blood pressure was checked while you were in the emergency department today. The last reading we obtained was  BP: 145/90 . Please read the guidelines below about what these numbers mean and what you should do about them.  If your systolic blood pressure (the top number) is less than 120 and your diastolic blood pressure (the bottom number) is less than 80, then your blood pressure is normal. There is nothing more that you need to do about it.  If your systolic blood pressure (the top number) is 120-139 or  your diastolic blood pressure (the bottom number) is 80-89, your blood pressure may be higher than it should be. You should have your blood pressure rechecked within a year by a primary care provider.  If your systolic blood pressure (the top number) is 140 or greater or your diastolic blood pressure (the bottom number) is 90 or greater, you may have high blood pressure. High blood pressure is treatable, but if left untreated over time it can put you at risk for heart attack, stroke, or kidney failure. You should have your blood pressure rechecked by a primary care provider within the next 4 weeks.  If your provider in the emergency department today gave you specific instructions to follow-up with your doctor or provider even sooner than that, you should follow that instruction and not wait for up to 4 weeks for your follow-up visit.        Thank you for choosing [facility]       Thank you for choosing [facility] for your care. Our goal is always to provide you with excellent care. Hearing back from our patients is one way we can continue to improve our services. Please take a few minutes to complete the written survey that you may receive in the mail after you visit with us. Thank you!        Care EveryWhere ID     This is your Care EveryWhere ID. This could be used by other organizations to access your [facility] medical records  [id number]        Equal Access to Services     LYNNE ARELLANO : Vivian Gipson, gemma mena, julio canales. So [facility] [phone].    ATENCIÓN: Si habla español, tiene a maldonado disposición servicios gratuitos de asistencia lingüística. Llame al [phone].    We comply with applicable federal civil rights laws and [place] laws. We do not discriminate on the basis of race, color, national origin, age, disability, sex, sexual orientation, or gender identity.            After Visit Summary       This is your record. Keep this  with you and show to your community pharmacist(s) and doctor(s) at your next visit.

## 2018-11-06 DIAGNOSIS — E78.5 HYPERLIPIDEMIA WITH TARGET LDL LESS THAN 100: ICD-10-CM

## 2018-11-06 DIAGNOSIS — I10 ESSENTIAL HYPERTENSION, BENIGN: ICD-10-CM

## 2018-11-06 LAB
ALT SERPL W P-5'-P-CCNC: 8 U/L (ref 5–30)
ANION GAP SERPL CALCULATED.3IONS-SCNC: 12.1 MMOL/L (ref 6–17)
BUN SERPL-MCNC: 16 MG/DL (ref 7–30)
CALCIUM SERPL-MCNC: 9.7 MG/DL (ref 8.5–10.5)
CHLORIDE SERPL-SCNC: 105 MMOL/L (ref 98–107)
CHOLEST SERPL-MCNC: 175 MG/DL
CO2 SERPL-SCNC: 28 MMOL/L (ref 23–29)
CREAT SERPL-MCNC: 1.27 MG/DL (ref 0.7–1.3)
GFR SERPL CREATININE-BSD FRML MDRD: 59 ML/MIN/1.7M2
GLUCOSE SERPL-MCNC: 103 MG/DL (ref 70–105)
HDLC SERPL-MCNC: 42 MG/DL
LDLC SERPL CALC-MCNC: 110 MG/DL
NONHDLC SERPL-MCNC: 133 MG/DL
POTASSIUM SERPL-SCNC: 4.1 MMOL/L (ref 3.5–5.1)
SODIUM SERPL-SCNC: 141 MMOL/L (ref 136–145)
TRIGL SERPL-MCNC: 113 MG/DL

## 2018-11-06 PROCEDURE — 36415 COLL VENOUS BLD VENIPUNCTURE: CPT | Performed by: INTERNAL MEDICINE

## 2018-11-06 PROCEDURE — 80048 BASIC METABOLIC PNL TOTAL CA: CPT | Performed by: INTERNAL MEDICINE

## 2018-11-06 PROCEDURE — 80061 LIPID PANEL: CPT | Performed by: INTERNAL MEDICINE

## 2018-11-06 PROCEDURE — 84460 ALANINE AMINO (ALT) (SGPT): CPT | Performed by: INTERNAL MEDICINE

## 2018-11-07 ENCOUNTER — CARE COORDINATION (OUTPATIENT)
Dept: CARDIOLOGY | Facility: CLINIC | Age: 53
End: 2018-11-07

## 2018-11-07 NOTE — PROGRESS NOTES
FLP and BMP results from 11/6/18 noted. Lisinopril stopped at 9/26 OV d/t decline in renal function. Pt called with results. Pt wanted  updated that his AM BPs have been averaging 126/80, HR 55 and PM /84, HR 50s. PT will be due for f/u with  and BMP/flp lab in March. Will call pt back if  has any other recommendations.     Component      Latest Ref Rng & Units 9/14/2018 10/23/2018 11/6/2018   Sodium      136 - 145 mmol/L  139 141   Potassium      3.5 - 5.1 mmol/L  4.1 4.1   Chloride      98 - 107 mmol/L  106 105   Carbon Dioxide      23 - 29 mmol/L  29 28   Anion Gap      6 - 17 mmol/L  4 12.1   Glucose      70 - 105 mg/dL  101 (H) 103   Urea Nitrogen      7 - 30 mg/dL  21 16   Creatinine      0.70 - 1.30 mg/dL  1.22 1.27   GFR Estimate      >60 mL/min/1.7m2  62 59 (L)   GFR Estimate If Black      >60 mL/min/1.7m2  75 72   Calcium      8.5 - 10.5 mg/dL  8.7 9.7   Cholesterol      <200 mg/dL 186  175   Triglycerides      <150 mg/dL 151 (H)  113   HDL Cholesterol      >39 mg/dL 41  42   LDL Cholesterol Calculated      <100 mg/dL 115 (H)  110 (H)   Non HDL Cholesterol      <130 mg/dL 145 (H)  133 (H)   ALT      5 - 30 U/L   8

## 2018-11-07 NOTE — TELEPHONE ENCOUNTER
BMP and FLP look good. Renal function better since stopping lisinopril. BPs look fine. No new changes. EE

## 2019-02-05 DIAGNOSIS — I50.22 CHRONIC SYSTOLIC CONGESTIVE HEART FAILURE (H): ICD-10-CM

## 2019-02-05 RX ORDER — CARVEDILOL 25 MG/1
25 TABLET ORAL 2 TIMES DAILY WITH MEALS
Qty: 180 TABLET | Refills: 0 | Status: SHIPPED | OUTPATIENT
Start: 2019-02-05 | End: 2019-05-06

## 2019-05-06 ENCOUNTER — OFFICE VISIT (OUTPATIENT)
Dept: CARDIOLOGY | Facility: CLINIC | Age: 54
End: 2019-05-06
Attending: INTERNAL MEDICINE
Payer: COMMERCIAL

## 2019-05-06 VITALS
HEIGHT: 70 IN | BODY MASS INDEX: 28.77 KG/M2 | SYSTOLIC BLOOD PRESSURE: 138 MMHG | HEART RATE: 72 BPM | WEIGHT: 201 LBS | DIASTOLIC BLOOD PRESSURE: 88 MMHG

## 2019-05-06 DIAGNOSIS — I77.810 MILD ASCENDING AORTA DILATION (H): ICD-10-CM

## 2019-05-06 DIAGNOSIS — I44.7 LBBB (LEFT BUNDLE BRANCH BLOCK): ICD-10-CM

## 2019-05-06 DIAGNOSIS — I50.22 CHRONIC SYSTOLIC CONGESTIVE HEART FAILURE (H): ICD-10-CM

## 2019-05-06 DIAGNOSIS — I42.8 NONISCHEMIC CARDIOMYOPATHY (H): Primary | ICD-10-CM

## 2019-05-06 DIAGNOSIS — E78.5 HYPERLIPIDEMIA WITH TARGET LDL LESS THAN 100: ICD-10-CM

## 2019-05-06 DIAGNOSIS — I42.8 NONISCHEMIC CARDIOMYOPATHY (H): ICD-10-CM

## 2019-05-06 LAB
ALT SERPL W P-5'-P-CCNC: 16 U/L (ref 5–30)
ANION GAP SERPL CALCULATED.3IONS-SCNC: 13.1 MMOL/L (ref 6–17)
BUN SERPL-MCNC: 16 MG/DL (ref 7–30)
CALCIUM SERPL-MCNC: 9.4 MG/DL (ref 8.5–10.5)
CHLORIDE SERPL-SCNC: 103 MMOL/L (ref 98–107)
CHOLEST SERPL-MCNC: 171 MG/DL
CO2 SERPL-SCNC: 26 MMOL/L (ref 23–29)
CREAT SERPL-MCNC: 1.27 MG/DL (ref 0.7–1.3)
GFR SERPL CREATININE-BSD FRML MDRD: 59 ML/MIN/{1.73_M2}
GLUCOSE SERPL-MCNC: 111 MG/DL (ref 70–105)
HDLC SERPL-MCNC: 45 MG/DL
LDLC SERPL CALC-MCNC: 101 MG/DL
NONHDLC SERPL-MCNC: 126 MG/DL
POTASSIUM SERPL-SCNC: 4.1 MMOL/L (ref 3.5–5.1)
SODIUM SERPL-SCNC: 138 MMOL/L (ref 136–145)
TRIGL SERPL-MCNC: 126 MG/DL

## 2019-05-06 PROCEDURE — 36415 COLL VENOUS BLD VENIPUNCTURE: CPT | Performed by: INTERNAL MEDICINE

## 2019-05-06 PROCEDURE — 99214 OFFICE O/P EST MOD 30 MIN: CPT | Performed by: INTERNAL MEDICINE

## 2019-05-06 PROCEDURE — 80061 LIPID PANEL: CPT | Performed by: INTERNAL MEDICINE

## 2019-05-06 PROCEDURE — 80048 BASIC METABOLIC PNL TOTAL CA: CPT | Performed by: INTERNAL MEDICINE

## 2019-05-06 PROCEDURE — 84460 ALANINE AMINO (ALT) (SGPT): CPT | Performed by: INTERNAL MEDICINE

## 2019-05-06 RX ORDER — LISINOPRIL 5 MG/1
2.5 TABLET ORAL DAILY
Qty: 90 TABLET | Refills: 3 | Status: SHIPPED | OUTPATIENT
Start: 2019-05-06 | End: 2019-05-08

## 2019-05-06 RX ORDER — CARVEDILOL 25 MG/1
25 TABLET ORAL 2 TIMES DAILY WITH MEALS
Qty: 180 TABLET | Refills: 3 | Status: SHIPPED | OUTPATIENT
Start: 2019-05-06 | End: 2020-04-30

## 2019-05-06 ASSESSMENT — MIFFLIN-ST. JEOR: SCORE: 1752.98

## 2019-05-06 NOTE — PROGRESS NOTES
HPI and Plan:   See dictation    Orders Placed This Encounter   Procedures     Basic metabolic panel     Basic metabolic panel     Basic metabolic panel     Follow-Up with Cardiologist       Orders Placed This Encounter   Medications     carvedilol (COREG) 25 MG tablet     Sig: Take 1 tablet (25 mg) by mouth 2 times daily (with meals)     Dispense:  180 tablet     Refill:  3     lisinopril (PRINIVIL/ZESTRIL) 5 MG tablet     Sig: Take 0.5 tablets (2.5 mg) by mouth daily     Dispense:  90 tablet     Refill:  3       Medications Discontinued During This Encounter   Medication Reason     carvedilol (COREG) 25 MG tablet Reorder         Encounter Diagnoses   Name Primary?     Nonischemic cardiomyopathy (H) Yes     Chronic systolic congestive heart failure (H)      LBBB (left bundle branch block)      Mild ascending aorta dilation (H)        CURRENT MEDICATIONS:  Current Outpatient Medications   Medication Sig Dispense Refill     carvedilol (COREG) 25 MG tablet Take 1 tablet (25 mg) by mouth 2 times daily (with meals) 180 tablet 3     lisinopril (PRINIVIL/ZESTRIL) 5 MG tablet Take 0.5 tablets (2.5 mg) by mouth daily 90 tablet 3       ALLERGIES   No Known Allergies    PAST MEDICAL HISTORY:  Past Medical History:   Diagnosis Date     Acute renal insufficiency      Anxiety      CHF (congestive heart failure), NYHA class I (H) 02/20/2015    2/21/15 Echo shows EF 30-35%, 4/14/15 Echo shows EF improved to 43-46%     Essential hypertension, benign 7/29/2015     History of cocaine use     prior     Hyperlipidemia LDL goal < 100      LBBB (left bundle branch block)      Mild ascending aorta dilation (H) 3/9/2016    3.9cm per echo 3/9/16      Nonischemic cardiomyopathy (H)     idiopathic nonischemic cardiopmyopathy; 3/9/16 Echo:  EF 45-50%; 2/21/15: EF 30-35%, 4/14/15: EF improved to 43-46%.          PAST SURGICAL HISTORY:  Past Surgical History:   Procedure Laterality Date     CHOLECYSTECTOMY         FAMILY HISTORY:  Family History    Problem Relation Age of Onset     Breast Cancer Mother      Cancer Father      Heart Disease Brother         pacemaker     Pacemaker Brother      Family History Negative Brother      Family History Negative Brother      Coronary Artery Disease No family hx of      Diabetes No family hx of        SOCIAL HISTORY:  Social History     Socioeconomic History     Marital status: Single     Spouse name: Not on file     Number of children: Not on file     Years of education: Not on file     Highest education level: Not on file   Occupational History     Not on file   Social Needs     Financial resource strain: Not on file     Food insecurity:     Worry: Not on file     Inability: Not on file     Transportation needs:     Medical: Not on file     Non-medical: Not on file   Tobacco Use     Smoking status: Never Smoker     Smokeless tobacco: Never Used   Substance and Sexual Activity     Alcohol use: Yes     Comment: occ. drink     Drug use: No     Sexual activity: Not on file   Lifestyle     Physical activity:     Days per week: Not on file     Minutes per session: Not on file     Stress: Not on file   Relationships     Social connections:     Talks on phone: Not on file     Gets together: Not on file     Attends [employer] service: Not on file     Active member of club or organization: Not on file     Attends meetings of clubs or organizations: Not on file     Relationship status: Not on file     Intimate partner violence:     Fear of current or ex partner: Not on file     Emotionally abused: Not on file     Physically abused: Not on file     Forced sexual activity: Not on file   Other Topics Concern     Parent/sibling w/ CABG, MI or angioplasty before 65F 55M? No      Service Not Asked     Blood Transfusions Not Asked     Caffeine Concern No     Comment: none     Occupational Exposure Not Asked     Hobby Hazards Not Asked     Sleep Concern No     Stress Concern Not Asked     Weight Concern Yes     Comment: weight  "loss     Special Diet Yes     Comment: watched his sodium intake     Back Care Not Asked     Exercise Yes     Comment: daily      Bike Helmet Not Asked     Seat Belt Not Asked     Self-Exams Not Asked   Social History Narrative     Not on file       Review of Systems:  Skin:  Negative       Eyes:  Negative      ENT:  Negative      Respiratory:  Negative       Cardiovascular:  Negative      Gastroenterology: Negative      Genitourinary:  Negative      Musculoskeletal:  Negative      Neurologic:  Negative      Psychiatric:  Negative      Heme/Lymph/Imm:  Negative      Endocrine:  Negative        Physical Exam:  Vitals: /88   Pulse 72   Ht 1.778 m (5' 10\")   Wt 91.2 kg (201 lb)   BMI 28.84 kg/m      Constitutional:  cooperative, alert and oriented, well developed, well nourished, in no acute distress        Skin:  warm and dry to the touch, no apparent skin lesions or masses noted          Head:  normocephalic, no masses or lesions        Eyes:  pupils equal and round        Lymph:      ENT:  no pallor or cyanosis, dentition good        Neck:  carotid pulses are full and equal bilaterally, JVP normal, no carotid bruit        Respiratory:  clear to auscultation         Cardiac: regular rhythm, normal S1/S2, no S3 or S4, apical impulse not displaced, no murmurs, gallops or rubs                pulses full and equal                                        GI:  BS normoactive;abdomen soft        Extremities and Muscular Skeletal:  no edema;no deformities, clubbing, cyanosis, erythema observed              Neurological:  no gross motor deficits;affect appropriate        Psych:  Alert and Oriented x 3        CC  Jose Mata MD  [address]  [place] [id number]              "

## 2019-05-06 NOTE — LETTER
5/6/2019      Lyndon Steve MD  [address] [facility] [address]      RE: Orlin Griggs       Dear Colleague,    I had the pleasure of seeing Orlin Griggs in the [facility].    Service Date: 05/06/2019      HISTORY OF PRESENT ILLNESS:  I had the opportunity to see Mr. Orlin Griggs in Cardiology Clinic today at the [facility] in [place] for re-evaluation of nonischemic cardiomyopathy.  Mr. Griggs is a 54-year-old gentleman diagnosed with cardiomyopathy in 2015 when his ejection fraction was 30%-35%.  He has normal coronary arteries by CT angiogram and had been on a medication program including carvedilol and lisinopril which resulted in near normalization of his left ventricular function.  We have evaluated his LV function with serial echocardiograms and occasional cardiac MRIs and the ejection fraction most recently was 53% by cardiac MRI in September.  He has minimal degree of dilation of the ascending aorta, measured at 4.0 cm.      We discontinued lisinopril a year or two ago because his creatinine was slightly above the normal range.  His GFR had decreased to 52.  Since then, his creatinine has been better at 1.27 with a stable GFR of 59.      He seems to be feeling very well.  He is constantly vigilant about his lifestyle including his diet and exercise programs and admits that sometimes his exercise varies.  Overall, he does not seem to be having any shortness of breath, edema, lightheadedness or syncope issues.  He does his best to keep well hydrated.      His cholesterol numbers are excellent, in fact better than they have been for the last year or so.      On examination today his blood pressure is 138/88, but he shows me with a long list of blood pressures which are excellent in the morning, typically in the 110s and 120s over 50s and 60s and a little higher in the afternoons, typically in the 130s/70s.  His heart rate is 72 and weight 201  pounds which seems to be quite stable or even down a couple of pounds since September.  His lungs are clear.  Heart rhythm is regular.  He has no cardiac murmurs or carotid bruits.      IMPRESSIONS:  Mr. Orlin Griggs is a 54-year-old gentleman with a nonischemic cardiomyopathy with minimal residual decrease in LV systolic function, most recently an ejection fraction of 53% with normal left ventricular chamber size.  This is a significant improvement from the time of diagnosis when his ejection fraction was 30%-35%.      However, he wants to try to continue to be proactive and be aggressive with this situation and normally I would have him on lisinopril consistently to help treat this condition and prevent any recurrence of cardiomyopathy.  His blood pressures are probably fine, although in the afternoons they are running sometimes above the 130 janene systolic.  However, I think he would certainly tolerate the addition of a low-dose of lisinopril from a blood pressure standpoint.  My only concern is his kidney function.  He wished to try it again and I will give him 2.5 mg of lisinopril which he will take in the late morning or early afternoon to help control those afternoon blood pressures and I will have him do a blood test in a couple of weeks if [facility]  [facility], he can cancel that lab appointment.  I will then recheck his kidney function in 3 months and again in 6 months when I see him back.      Fortunately, he seems to be doing very well and most of our management here is being proactive and trying to stay aggressive with his treatment program.  He seems to be doing fine with the carvedilol.      cc:   [place] [address]         MEENA AGGARWAL MD, [facility]             D: 05/06/2019   T: 05/06/2019   MT: YUNIOR      Name:     ORLIN GRIGGS   MRN:      [id number]        Account:       [id number]   :      1965           Service Date: 2019      Document: [id number]         Outpatient Encounter Medications as of 2019   Medication Sig Dispense Refill     carvedilol (COREG) 25 MG tablet Take 1 tablet (25 mg) by mouth 2 times daily (with meals) 180 tablet 3     [DISCONTINUED] lisinopril (PRINIVIL/ZESTRIL) 5 MG tablet Take 0.5 tablets (2.5 mg) by mouth daily 90 tablet 3     [DISCONTINUED] carvedilol (COREG) 25 MG tablet Take 1 tablet (25 mg) by mouth 2 times daily (with meals) 180 tablet 0     No facility-administered encounter medications on file as of 2019.        Again, thank you for allowing me to participate in the care of your patient.      Sincerely,    MEENA AGGARWAL MD     [facility]

## 2019-05-06 NOTE — PROGRESS NOTES
Service Date: 05/06/2019      HISTORY OF PRESENT ILLNESS:  I had the opportunity to see Mr. Orlin Griggs in Cardiology Clinic today at the [facility] in [place] for re-evaluation of nonischemic cardiomyopathy.  Mr. Griggs is a 54-year-old gentleman diagnosed with cardiomyopathy in 2015 when his ejection fraction was 30%-35%.  He has normal coronary arteries by CT angiogram and had been on a medication program including carvedilol and lisinopril which resulted in near normalization of his left ventricular function.  We have evaluated his LV function with serial echocardiograms and occasional cardiac MRIs and the ejection fraction most recently was 53% by cardiac MRI in September.  He has minimal degree of dilation of the ascending aorta, measured at 4.0 cm.      We discontinued lisinopril a year or two ago because his creatinine was slightly above the normal range.  His GFR had decreased to 52.  Since then, his creatinine has been better at 1.27 with a stable GFR of 59.      He seems to be feeling very well.  He is constantly vigilant about his lifestyle including his diet and exercise programs and admits that sometimes his exercise varies.  Overall, he does not seem to be having any shortness of breath, edema, lightheadedness or syncope issues.  He does his best to keep well hydrated.      His cholesterol numbers are excellent, in fact better than they have been for the last year or so.      On examination today his blood pressure is 138/88, but he shows me with a long list of blood pressures which are excellent in the morning, typically in the 110s and 120s over 50s and 60s and a little higher in the afternoons, typically in the 130s/70s.  His heart rate is 72 and weight 201 pounds which seems to be quite stable or even down a couple of pounds since September.  His lungs are clear.  Heart rhythm is regular.  He has no cardiac murmurs or carotid bruits.      IMPRESSIONS:  Mr. Orlin Griggs is a  54-year-old gentleman with a nonischemic cardiomyopathy with minimal residual decrease in LV systolic function, most recently an ejection fraction of 53% with normal left ventricular chamber size.  This is a significant improvement from the time of diagnosis when his ejection fraction was 30%-35%.      However, he wants to try to continue to be proactive and be aggressive with this situation and normally I would have him on lisinopril consistently to help treat this condition and prevent any recurrence of cardiomyopathy.  His blood pressures are probably fine, although in the afternoons they are running sometimes above the 130 janene systolic.  However, I think he would certainly tolerate the addition of a low-dose of lisinopril from a blood pressure standpoint.  My only concern is his kidney function.  He wished to try it again and I will give him 2.5 mg of lisinopril which he will take in the late morning or early afternoon to help control those afternoon blood pressures and I will have him do a blood test in a couple of weeks if [facility].  [facility], he can cancel that lab appointment.  I will then recheck his kidney function in 3 months and again in 6 months when I see him back.      Fortunately, he seems to be doing very well and most of our management here is being proactive and trying to stay aggressive with his treatment program.  He seems to be doing fine with the carvedilol.      cc:   Lyndon Steve MD   [address]  [address]         MEENA AGGARWAL MD, [facility]             D: 2019   T: 2019   MT: YUNIOR      Name:     ISHA PANIAGUA   MRN:      [id number]        Account:      [id number]   :      1965           Service Date: 2019      Document: [id number]

## 2019-05-08 DIAGNOSIS — I42.8 NONISCHEMIC CARDIOMYOPATHY (H): ICD-10-CM

## 2019-05-08 RX ORDER — LISINOPRIL 2.5 MG/1
2.5 TABLET ORAL DAILY
Qty: 90 TABLET | Refills: 3 | Status: SHIPPED | OUTPATIENT
Start: 2019-05-08 | End: 2019-11-18

## 2019-05-22 ENCOUNTER — CARE COORDINATION (OUTPATIENT)
Dept: CARDIOLOGY | Facility: CLINIC | Age: 54
End: 2019-05-22

## 2019-05-22 DIAGNOSIS — I42.8 NONISCHEMIC CARDIOMYOPATHY (H): ICD-10-CM

## 2019-05-22 LAB
ANION GAP SERPL CALCULATED.3IONS-SCNC: 10.9 MMOL/L (ref 6–17)
BUN SERPL-MCNC: 18 MG/DL (ref 7–30)
CALCIUM SERPL-MCNC: 9.5 MG/DL (ref 8.5–10.5)
CHLORIDE SERPL-SCNC: 103 MMOL/L (ref 98–107)
CO2 SERPL-SCNC: 29 MMOL/L (ref 23–29)
CREAT SERPL-MCNC: 1.34 MG/DL (ref 0.7–1.3)
GFR SERPL CREATININE-BSD FRML MDRD: 55 ML/MIN/{1.73_M2}
GLUCOSE SERPL-MCNC: 100 MG/DL (ref 70–105)
POTASSIUM SERPL-SCNC: 3.9 MMOL/L (ref 3.5–5.1)
SODIUM SERPL-SCNC: 139 MMOL/L (ref 136–145)

## 2019-05-22 PROCEDURE — 36415 COLL VENOUS BLD VENIPUNCTURE: CPT | Performed by: INTERNAL MEDICINE

## 2019-05-22 PROCEDURE — 80048 BASIC METABOLIC PNL TOTAL CA: CPT | Performed by: INTERNAL MEDICINE

## 2019-05-22 NOTE — PROGRESS NOTES
Checked 2 weeks after pt started low dose lisinopril 2.5 mg. Slight elevation in creatinine, but overall stable. Will message Dr. Mata to see if he's OK with continuing on lisinopril and recheck BMP in 3 months. Sole Dean RN on 5/22/2019 at 11:40 AM      Component      Latest Ref Rng & Units 10/23/2018 11/6/2018 5/6/2019 5/22/2019   Sodium      136 - 145 mmol/L 139 141 138 139   Potassium      3.5 - 5.1 mmol/L 4.1 4.1 4.1 3.9   Chloride      98 - 107 mmol/L 106 105 103 103   Carbon Dioxide      23 - 29 mmol/L 29 28 26 29   Anion Gap      6 - 17 mmol/L 4 12.1 13.1 10.9   Glucose      70 - 105 mg/dL 101 (H) 103 111 (H) 100   Urea Nitrogen      7 - 30 mg/dL 21 16 16 18   Creatinine      0.70 - 1.30 mg/dL 1.22 1.27 1.27 1.34 (H)   GFR Estimate      >60 mL/min/1.73:m2 62 59 (L) 59 (L) 55 (L)   GFR Estimate If Black      >60 mL/min/1.73:m2 75 72 71 67   Calcium      8.5 - 10.5 mg/dL 8.7 9.7 9.4 9.5

## 2019-05-23 ENCOUNTER — TRANSFERRED RECORDS (OUTPATIENT)
Dept: HEALTH INFORMATION MANAGEMENT | Facility: CLINIC | Age: 54
End: 2019-05-23

## 2019-05-23 NOTE — PROGRESS NOTES
Called pt to review lab results stable after starting lisinopril 2.5mg daily. Pt reports his BP is overall controlled, no side effects noted. Pt states he occasionally has a slightly elevated BP late in the evening when his meds are wearing off. Pt inquired whether he should increase his lisinopril to 5 mg or if the 2.5mg is enough. I reviewed that Dr. Mata was hesitant to restart lisinopril as he had some decrease in renal function apparently in the past after startiing lisinopril. I advised pt to call us if BP is consistently elevated, not just an occasional one at the end of the day. Patient states his BP during the day is 120-130 systolic over 70-80 diastolic. [facility] and [facility] states he will have them send us those records. Sole Dean RN on 5/23/2019 at 3:52 PM         Labs Only (BMP)     Jose Mata MD  [facility] Core Nurse 4 hours ago (11:38 AM)         I would suggest that we continue lisinopril and recheck in 3 months. I don't consider this a significant change. If he wants to stop it, I am ok with that, too. EE         Documentation

## 2019-05-23 NOTE — TELEPHONE ENCOUNTER
I would suggest that we continue lisinopril and recheck in 3 months. I don't consider this a significant change. If he wants to stop it, I am ok with that, too. EE

## 2019-05-24 ENCOUNTER — TRANSFERRED RECORDS (OUTPATIENT)
Dept: HEALTH INFORMATION MANAGEMENT | Facility: CLINIC | Age: 54
End: 2019-05-24

## 2019-08-01 ENCOUNTER — TELEPHONE (OUTPATIENT)
Dept: CARDIOLOGY | Facility: CLINIC | Age: 54
End: 2019-08-01

## 2019-08-01 NOTE — TELEPHONE ENCOUNTER
[facility] Pt had concerns about the dilated IVC and RV. I spoke to pt yesterday, and he said he is feeling well. He denied any issues with SOB or edema. Pt said he has lost 7#. He wanted to know 's thoughts on his results. I updated  yesterday, and provided him with echo report.  responded today with his thoughts. I called pt and reviewed 's response. Pt was happy to heart that the dilated IVC was not something that's of concern to . Pt wondering if he should increase his lisinopril. He has a BMP scheduled for end of August, and will call us at that time with his BPs, and update on how he is doing. Otherwise, pt will see  in November. Pt is aware that he should notify us if he has any SOB or edema, signs of fluid retention. Echo report sent to be scanned into [employer]. Enma CAMARENA August 1, 2019, 3:06 PM

## 2019-08-01 NOTE — TELEPHONE ENCOUNTER
----- Message from Jose Mata MD sent at 7/31/2019  5:50 PM CDT -----  Regarding: pt questions  The finding of a dilated IVC is somewhat nonspecific. We use the IVC size and reactivity with respiration (it should decrease in size with inspiration) to determine the patient's volume status, but it is not perfect. Young people often have a larger IVC, especially when well hydrated, even when not fluid-overloaded. Because everything else on the echo looked so good, I doubt that this alone would represent a sign of decompensated congestive heart failure. In fact, the rest of the echo looked great. The EF was completely normal, which is a much more important indicator of risk than the IVC. I don't think this needs any treatment unless you are also noting ankle swelling or shortness of breath (other signs of CHF). In that case we could talk about adding a low dose of diuretic, but if not, I would not add the diuretic because you don't need it. The right ventricle is often judged to be enlarged because of a bad angle for interpretation. The echo does not evaluate the RV well. The MRI is much better at that. I don't think that is a problem either.     Jose Mata

## 2019-08-30 ENCOUNTER — CARE COORDINATION (OUTPATIENT)
Dept: CARDIOLOGY | Facility: CLINIC | Age: 54
End: 2019-08-30

## 2019-08-30 DIAGNOSIS — I42.8 NONISCHEMIC CARDIOMYOPATHY (H): ICD-10-CM

## 2019-08-30 LAB
ANION GAP SERPL CALCULATED.3IONS-SCNC: 11.9 MMOL/L (ref 6–17)
BUN SERPL-MCNC: 14 MG/DL (ref 7–30)
CALCIUM SERPL-MCNC: 9.9 MG/DL (ref 8.5–10.5)
CHLORIDE SERPL-SCNC: 103 MMOL/L (ref 98–107)
CO2 SERPL-SCNC: 29 MMOL/L (ref 23–29)
CREAT SERPL-MCNC: 1.42 MG/DL (ref 0.7–1.3)
GFR SERPL CREATININE-BSD FRML MDRD: 52 ML/MIN/{1.73_M2}
GLUCOSE SERPL-MCNC: 106 MG/DL (ref 70–105)
POTASSIUM SERPL-SCNC: 3.9 MMOL/L (ref 3.5–5.1)
SODIUM SERPL-SCNC: 140 MMOL/L (ref 136–145)

## 2019-08-30 PROCEDURE — 80048 BASIC METABOLIC PNL TOTAL CA: CPT | Performed by: INTERNAL MEDICINE

## 2019-08-30 PROCEDURE — 36415 COLL VENOUS BLD VENIPUNCTURE: CPT | Performed by: INTERNAL MEDICINE

## 2019-08-30 NOTE — TELEPHONE ENCOUNTER
I am ok with him continuing lisinopril. The creatinine is a little higher, but renal function is not affected. This is a known effect of lisinopril.  EE

## 2019-08-30 NOTE — PROGRESS NOTES
Pt had BMP done today, as he was wanting to increase his lisinopril further. Pt re-started on lisinopril 2.5 mg daily on 5/6/19 as he wanted to be proactive in maintaining the recovery of his LV systolic function. He had been on lisinopril in the past for his cardiomyopathy, but then his renal function declined, so Dr. Mata discontinued it on 9/26/18 when his creatinine was 1.43 with a GFR of 52.  Pt's renal function improved with a creatinine of 1.22-1.27 and GFR of 59-62 once he came off the lisinopril. However, pt's renal function has been slowly declining again since he resumed lisinopril and today his creatinine is 1.42 with a GFR of 52.    Called pt for an update on BP's and HF sx. Pt states everything is going really well. 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 Pt said his BP have been averaging < 130/80. Today his BP was 118/74 this morning. Pt said he's staying hydrated and has been improving his diet and losing weight. He's excited to start night shift, but concerned about how it may affect his BP.     Will send results to Dr. Mata for recommendations. Sole Dean RN on 8/30/2019 at 9:51 AM      Per Dr. Mata's 5/6/19 OV note:    However, he wants to try to continue to be proactive and be aggressive with this situation and normally I would have him on lisinopril consistently to help treat this condition and prevent any recurrence of cardiomyopathy. His blood pressures are probably fine, although in the afternoons they are running sometimes above the 130 janene systolic.  However, I think he would certainly tolerate the addition of a low-dose of lisinopril from a blood pressure standpoint.  My only concern is his kidney function.  He wished to try it again and I will give him 2.5 mg of lisinopril which he will take in the late morning or early afternoon to help control those afternoon blood pressures and I will have him do a blood test in a couple of weeks if he does not  [facility].  [facility], he can cancel that lab appointment.  I will then recheck his kidney function in 3 months and again in 6 months when I see him back.         Component      Latest Ref Rng & Units 5/22/2019 8/30/2019   Sodium      136 - 145 mmol/L 139 140   Potassium      3.5 - 5.1 mmol/L 3.9 3.9   Chloride      98 - 107 mmol/L 103 103   Carbon Dioxide      23 - 29 mmol/L 29 29   Anion Gap      6 - 17 mmol/L 10.9 11.9   Glucose      70 - 105 mg/dL 100 106 (H)   Urea Nitrogen      7 - 30 mg/dL 18 14   Creatinine      0.70 - 1.30 mg/dL 1.34 (H) 1.42 (H)   GFR Estimate      >60 mL/min/1.73:m2 55 (L) 52 (L)   GFR Estimate If Black      >60 mL/min/1.73:m2 67 63   Calcium      8.5 - 10.5 mg/dL 9.5 9.9       Component      Latest Ref Rng & Units 9/14/2018 10/23/2018 11/6/2018 5/6/2019   Sodium      136 - 145 mmol/L 140 139 141 138   Potassium      3.5 - 5.1 mmol/L 4.0 4.1 4.1 4.1   Chloride      98 - 107 mmol/L 104 106 105 103   Carbon Dioxide      23 - 29 mmol/L 27 29 28 26   Anion Gap      6 - 17 mmol/L 13 4 12.1 13.1   Glucose      70 - 105 mg/dL 107 (H) 101 (H) 103 111 (H)   Urea Nitrogen      7 - 30 mg/dL 20 21 16 16   Creatinine      0.70 - 1.30 mg/dL 1.43 (H) 1.22 1.27 1.27   GFR Estimate      >60 mL/min/1.73:m2 52 (L) 62 59 (L) 59 (L)   GFR Estimate If Black      >60 mL/min/1.73:m2 62 75 72 71   Calcium      8.5 - 10.5 mg/dL 9.7 8.7 9.7 9.4

## 2019-09-04 NOTE — PROGRESS NOTES
"Called pt with recommendations per Dr. Mata:     \"I am ok with him continuing lisinopril. The creatinine is a little higher, but renal function is not affected. This is a known effect of lisinopril. EE\"    Pt said he will continue current tx plan. He states he is working on reducing phosphorus in his diet and exercising. F/U next with labs and OV in November. Sole Dean RN on 9/4/2019 at 11:54 AM    "

## 2019-11-18 ENCOUNTER — OFFICE VISIT (OUTPATIENT)
Dept: CARDIOLOGY | Facility: CLINIC | Age: 54
End: 2019-11-18
Attending: INTERNAL MEDICINE
Payer: COMMERCIAL

## 2019-11-18 VITALS
WEIGHT: 203 LBS | SYSTOLIC BLOOD PRESSURE: 114 MMHG | DIASTOLIC BLOOD PRESSURE: 82 MMHG | HEART RATE: 64 BPM | HEIGHT: 70 IN | BODY MASS INDEX: 29.06 KG/M2

## 2019-11-18 DIAGNOSIS — I42.8 NONISCHEMIC CARDIOMYOPATHY (H): ICD-10-CM

## 2019-11-18 DIAGNOSIS — I10 ESSENTIAL HYPERTENSION, BENIGN: ICD-10-CM

## 2019-11-18 DIAGNOSIS — I77.810 MILD ASCENDING AORTA DILATION (H): Primary | ICD-10-CM

## 2019-11-18 DIAGNOSIS — I44.7 LBBB (LEFT BUNDLE BRANCH BLOCK): ICD-10-CM

## 2019-11-18 LAB
ANION GAP SERPL CALCULATED.3IONS-SCNC: 11.9 MMOL/L (ref 6–17)
BUN SERPL-MCNC: 19 MG/DL (ref 7–30)
CALCIUM SERPL-MCNC: 9.7 MG/DL (ref 8.5–10.5)
CHLORIDE SERPL-SCNC: 104 MMOL/L (ref 98–107)
CO2 SERPL-SCNC: 30 MMOL/L (ref 23–29)
CREAT SERPL-MCNC: 1.33 MG/DL (ref 0.7–1.3)
GFR SERPL CREATININE-BSD FRML MDRD: 56 ML/MIN/{1.73_M2}
GLUCOSE SERPL-MCNC: 103 MG/DL (ref 70–105)
POTASSIUM SERPL-SCNC: 3.9 MMOL/L (ref 3.5–5.1)
SODIUM SERPL-SCNC: 142 MMOL/L (ref 136–145)

## 2019-11-18 PROCEDURE — 99214 OFFICE O/P EST MOD 30 MIN: CPT | Performed by: INTERNAL MEDICINE

## 2019-11-18 PROCEDURE — 80048 BASIC METABOLIC PNL TOTAL CA: CPT | Performed by: INTERNAL MEDICINE

## 2019-11-18 PROCEDURE — 36415 COLL VENOUS BLD VENIPUNCTURE: CPT | Performed by: INTERNAL MEDICINE

## 2019-11-18 RX ORDER — LISINOPRIL 2.5 MG/1
2.5 TABLET ORAL 2 TIMES DAILY
Qty: 90 TABLET | Refills: 3 | Status: SHIPPED | OUTPATIENT
Start: 2019-11-18 | End: 2020-02-05

## 2019-11-18 ASSESSMENT — MIFFLIN-ST. JEOR: SCORE: 1762.05

## 2019-11-18 NOTE — PROGRESS NOTES
HPI and Plan:   See dictation    Orders Placed This Encounter   Procedures     Basic metabolic panel     Basic metabolic panel     Follow-Up with Cardiologist     Echocardiogram Complete       Orders Placed This Encounter   Medications     lisinopril (PRINIVIL/ZESTRIL) 2.5 MG tablet     Sig: Take 1 tablet (2.5 mg) by mouth 2 times daily     Dispense:  90 tablet     Refill:  3       Medications Discontinued During This Encounter   Medication Reason     lisinopril (PRINIVIL/ZESTRIL) 2.5 MG tablet          Encounter Diagnoses   Name Primary?     Nonischemic cardiomyopathy (H)      Mild ascending aorta dilation (H) Yes     LBBB (left bundle branch block)      Essential hypertension, benign        CURRENT MEDICATIONS:  Current Outpatient Medications   Medication Sig Dispense Refill     carvedilol (COREG) 25 MG tablet Take 1 tablet (25 mg) by mouth 2 times daily (with meals) 180 tablet 3     lisinopril (PRINIVIL/ZESTRIL) 2.5 MG tablet Take 1 tablet (2.5 mg) by mouth 2 times daily 90 tablet 3       ALLERGIES   No Known Allergies    PAST MEDICAL HISTORY:  Past Medical History:   Diagnosis Date     Acute renal insufficiency      Anxiety      CHF (congestive heart failure), NYHA class I (H) 02/20/2015    2/21/15 Echo shows EF 30-35%, 4/14/15 Echo shows EF improved to 43-46%     Essential hypertension, benign 7/29/2015     History of cocaine use     prior     Hyperlipidemia LDL goal < 100      LBBB (left bundle branch block)      Mild ascending aorta dilation (H) 3/9/2016    3.9cm per echo 3/9/16      Nonischemic cardiomyopathy (H)     idiopathic nonischemic cardiopmyopathy; 3/9/16 Echo:  EF 45-50%; 2/21/15: EF 30-35%, 4/14/15: EF improved to 43-46%.          PAST SURGICAL HISTORY:  Past Surgical History:   Procedure Laterality Date     CHOLECYSTECTOMY         FAMILY HISTORY:  Family History   Problem Relation Age of Onset     Breast Cancer Mother      Cancer Father      Heart Disease Brother         pacemaker     Pacemaker  Brother      Family History Negative Brother      Family History Negative Brother      Coronary Artery Disease No family hx of      Diabetes No family hx of        SOCIAL HISTORY:  Social History     Socioeconomic History     Marital status: Single     Spouse name: None     Number of children: None     Years of education: None     Highest education level: None   Occupational History     None   Social Needs     Financial resource strain: None     Food insecurity:     Worry: None     Inability: None     Transportation needs:     Medical: None     Non-medical: None   Tobacco Use     Smoking status: Never Smoker     Smokeless tobacco: Never Used   Substance and Sexual Activity     Alcohol use: Yes     Comment: occ. drink     Drug use: No     Sexual activity: None   Lifestyle     Physical activity:     Days per week: None     Minutes per session: None     Stress: None   Relationships     Social connections:     Talks on phone: None     Gets together: None     Attends Bahai service: None     Active member of club or organization: None     Attends meetings of clubs or organizations: None     Relationship status: None     Intimate partner violence:     Fear of current or ex partner: None     Emotionally abused: None     Physically abused: None     Forced sexual activity: None   Other Topics Concern     Parent/sibling w/ CABG, MI or angioplasty before 65F 55M? No      Service Not Asked     Blood Transfusions Not Asked     Caffeine Concern No     Comment: none     Occupational Exposure Not Asked     Hobby Hazards Not Asked     Sleep Concern No     Stress Concern Not Asked     Weight Concern Yes     Comment: weight loss     Special Diet Yes     Comment: watched his sodium intake     Back Care Not Asked     Exercise Yes     Comment: daily      Bike Helmet Not Asked     Seat Belt Not Asked     Self-Exams Not Asked   Social History Narrative     None       Review of Systems:  Skin:  Negative       Eyes:  Negative     "  ENT:  Negative      Respiratory:  Negative       Cardiovascular:  Negative      Gastroenterology: Negative      Genitourinary:  Negative      Musculoskeletal:  Negative      Neurologic:  Negative      Psychiatric:  Negative      Heme/Lymph/Imm:  Negative      Endocrine:  Negative        Physical Exam:  Vitals: /82   Pulse 64   Ht 1.778 m (5' 10\")   Wt 92.1 kg (203 lb)   BMI 29.13 kg/m      Constitutional:  cooperative, alert and oriented, well developed, well nourished, in no acute distress        Skin:  warm and dry to the touch, no apparent skin lesions or masses noted          Head:  normocephalic, no masses or lesions        Eyes:  pupils equal and round        Lymph:      ENT:  no pallor or cyanosis, dentition good        Neck:  carotid pulses are full and equal bilaterally, JVP normal, no carotid bruit        Respiratory:  clear to auscultation         Cardiac: regular rhythm, normal S1/S2, no S3 or S4, apical impulse not displaced, no murmurs, gallops or rubs                pulses full and equal                                        GI:  BS normoactive;abdomen soft        Extremities and Muscular Skeletal:  no edema;no deformities, clubbing, cyanosis, erythema observed              Neurological:  no gross motor deficits;affect appropriate        Psych:  Alert and Oriented x 3        CC  Jose Mata MD  [address]  [place] [address]              "

## 2019-11-18 NOTE — PROGRESS NOTES
Service Date: 11/18/2019      HISTORY OF PRESENT ILLNESS:  I had the opportunity to see Mr. Orlin Griggs in Cardiology Clinic today at the [facility] in [place] for reevaluation of nonischemic cardiomyopathy and mild aortic root enlargement.  Mr. Griggs is a 54-year-old gentleman diagnosed with cardiomyopathy in 2015 when his ejection fraction was 30%-35%.  He had normal coronary arteries by CT angiogram and we have been treating him with a program of carvedilol and lisinopril which has resulted in normalization or near normal left ventricular systolic function.  [facility] biplane planimeter ejection fraction of 60% with normal left ventricular chamber size and normal wall thickness.  His aorta was measured at 4.3 cm which is stable compared to previous measurements.  By MRI, the aorta measured 4.0 cm, which is probably a more accurate assessment.      His creatinine has been slightly above normal consistently.  The cause for this is unclear, but it does not seem to be affected by lisinopril.      He is feeling very well.  He is still working out regularly, although indicates that he does not think he is exercising as much as he used to.  His weight is quite stable and he is not having any concerning symptoms of shortness of breath, chest discomfort, lightheadedness or syncope.      PHYSICAL EXAMINATION:  His blood pressure is 114/82, heart rate 64 and weight 203 pounds.  This is a lower blood pressure than we have seen in the past.  His cholesterol numbers from May continue to be excellent and his creatinine today is 1.33, down slightly from August when it was 1.42.  Potassium is normal at 3.9.      IMPRESSIONS:  Mr. Orlin Griggs is a 54-year-old gentleman with a history of nonischemic cardiomyopathy with significant improvement in left ventricular function with medical therapy.  His left ventricular ejection fraction has normalized.  His left  ventricular chamber size is normal as well.  He has mild degree of aortic root dilation.  We talked a lot about his activity.  He is lifting some weights, but doing lower weights and higher repetitions for each set and breathing comfortably through those exercises.  He is also doing occasional lifting at work, although tries to minimize the heavy lifting.  I reinforced the 50-pound lifting limit that I had recommended based on his mild aortic root dilation, primarily.  He is concerned about that as well as his left ventricular wall thickness.  He suggested increasing the lisinopril to 2.5 mg twice daily and I think that is certainly reasonable.  It might provide better 24-hour blood pressure control and prevent any wall thickening.  If he has lightheadedness issues, we certainly need to reconsider that.  We will check his kidney function in 3 months and if that looks worse we may reconsider the higher dose lisinopril for that reason as well.  Otherwise, I will plan to see him back again in 6 months for reevaluation.      cc:   [place] [address]         MEENA AGGARWAL MD, [facility]             D: 2019   T: 2019   MT: YUNIOR      Name:     ISHA PANIAGUA   MRN:      [id number]        Account:      [id number]   :      1965           Service Date: 2019      Document: [id number]

## 2019-11-18 NOTE — LETTER
11/18/2019      Lyndon Steve MD  [address] [facility] [address]      RE: Orlin Griggs       Dear Colleague,    I had the pleasure of seeing Orlin Griggs in the [facility].    Service Date: 11/18/2019      HISTORY OF PRESENT ILLNESS:  I had the opportunity to see Mr. Orlin Griggs in Cardiology Clinic today at the [facility] in [place] for reevaluation of nonischemic cardiomyopathy and mild aortic root enlargement.  Mr. Griggs is a 54-year-old gentleman diagnosed with cardiomyopathy in 2015 when his ejection fraction was 30%-35%.  He had normal coronary arteries by CT angiogram and we have been treating him with a program of carvedilol and lisinopril which has resulted in normalization or near normal left ventricular systolic function.  [facility] biplane planimeter ejection fraction of 60% with normal left ventricular chamber size and normal wall thickness.  His aorta was measured at 4.3 cm which is stable compared to previous measurements.  By MRI, the aorta measured 4.0 cm, which is probably a more accurate assessment.      His creatinine has been slightly above normal consistently.  The cause for this is unclear, but it does not seem to be affected by lisinopril.      He is feeling very well.  He is still working out regularly, although indicates that he does not think he is exercising as much as he used to.  His weight is quite stable and he is not having any concerning symptoms of shortness of breath, chest discomfort, lightheadedness or syncope.      PHYSICAL EXAMINATION:  His blood pressure is 114/82, heart rate 64 and weight 203 pounds.  This is a lower blood pressure than we have seen in the past.  His cholesterol numbers from May continue to be excellent and his creatinine today is 1.33, down slightly from August when it was 1.42.  Potassium is normal at 3.9.      IMPRESSIONS:  Mr. Ordaz  Anson is a 54-year-old gentleman with a history of nonischemic cardiomyopathy with significant improvement in left ventricular function with medical therapy.  His left ventricular ejection fraction has normalized.  His left ventricular chamber size is normal as well.  He has mild degree of aortic root dilation.  We talked a lot about his activity.  He is lifting some weights, but doing lower weights and higher repetitions for each set and breathing comfortably through those exercises.  He is also doing occasional lifting at work, although tries to minimize the heavy lifting.  I reinforced the 50-pound lifting limit that I had recommended based on his mild aortic root dilation, primarily.  He is concerned about that as well as his left ventricular wall thickness.  He suggested increasing the lisinopril to 2.5 mg twice daily and I think that is certainly reasonable.  It might provide better 24-hour blood pressure control and prevent any wall thickening.  If he has lightheadedness issues, we certainly need to reconsider that.  We will check his kidney function in 3 months and if that looks worse we may reconsider the higher dose lisinopril for that reason as well.  Otherwise, I will plan to see him back again in 6 months for reevaluation.      cc:   [place] [address]         MEENA AGGARWAL MD, [facility]             D: 2019   T: 2019   MT: YUNIOR      Name:     ISHA PANIAGUA   MRN:      [id number]        Account:      [id number]   :      1965           Service Date: 2019      Document: [id number]         Outpatient Encounter Medications as of 2019   Medication Sig Dispense Refill     carvedilol (COREG) 25 MG tablet Take 1 tablet (25 mg) by mouth 2 times daily (with meals) 180 tablet 3     lisinopril (PRINIVIL/ZESTRIL) 2.5 MG tablet Take 1 tablet (2.5 mg) by mouth 2 times daily 90 tablet 3     [DISCONTINUED] lisinopril  (PRINIVIL/ZESTRIL) 2.5 MG tablet Take 1 tablet (2.5 mg) by mouth daily 90 tablet 3     No facility-administered encounter medications on file as of 11/18/2019.                Again, thank you for allowing me to participate in the care of your patient.      Sincerely,    MEENA AGGARWAL MD     [facility]

## 2019-11-18 NOTE — LETTER
11/18/2019    Lyndon Steve MD  [address] [facility] [address]    RE: Orlin Griggs       Dear Colleague,    I had the pleasure of seeing Orlin Griggs in the [facility].    HPI and Plan:   See dictation    Orders Placed This Encounter   Procedures     Basic metabolic panel     Basic metabolic panel     Follow-Up with Cardiologist     Echocardiogram Complete       Orders Placed This Encounter   Medications     lisinopril (PRINIVIL/ZESTRIL) 2.5 MG tablet     Sig: Take 1 tablet (2.5 mg) by mouth 2 times daily     Dispense:  90 tablet     Refill:  3       Medications Discontinued During This Encounter   Medication Reason     lisinopril (PRINIVIL/ZESTRIL) 2.5 MG tablet          Encounter Diagnoses   Name Primary?     Nonischemic cardiomyopathy (H)      Mild ascending aorta dilation (H) Yes     LBBB (left bundle branch block)      Essential hypertension, benign        CURRENT MEDICATIONS:  Current Outpatient Medications   Medication Sig Dispense Refill     carvedilol (COREG) 25 MG tablet Take 1 tablet (25 mg) by mouth 2 times daily (with meals) 180 tablet 3     lisinopril (PRINIVIL/ZESTRIL) 2.5 MG tablet Take 1 tablet (2.5 mg) by mouth 2 times daily 90 tablet 3       ALLERGIES   No Known Allergies    PAST MEDICAL HISTORY:  Past Medical History:   Diagnosis Date     Acute renal insufficiency      Anxiety      CHF (congestive heart failure), NYHA class I (H) 02/20/2015    2/21/15 Echo shows EF 30-35%, 4/14/15 Echo shows EF improved to 43-46%     Essential hypertension, benign 7/29/2015     History of cocaine use     prior     Hyperlipidemia LDL goal < 100      LBBB (left bundle branch block)      Mild ascending aorta dilation (H) 3/9/2016    3.9cm per echo 3/9/16      Nonischemic cardiomyopathy (H)     idiopathic nonischemic cardiopmyopathy; 3/9/16 Echo:  EF 45-50%; 2/21/15: EF 30-35%, 4/14/15: EF improved to 43-46%.          PAST SURGICAL HISTORY:  Past Surgical History:    Procedure Laterality Date     CHOLECYSTECTOMY         FAMILY HISTORY:  Family History   Problem Relation Age of Onset     Breast Cancer Mother      Cancer Father      Heart Disease Brother         pacemaker     Pacemaker Brother      Family History Negative Brother      Family History Negative Brother      Coronary Artery Disease No family hx of      Diabetes No family hx of        SOCIAL HISTORY:  Social History     Socioeconomic History     Marital status: Single     Spouse name: None     Number of children: None     Years of education: None     Highest education level: None   Occupational History     None   Social Needs     Financial resource strain: None     Food insecurity:     Worry: None     Inability: None     Transportation needs:     Medical: None     Non-medical: None   Tobacco Use     Smoking status: Never Smoker     Smokeless tobacco: Never Used   Substance and Sexual Activity     Alcohol use: Yes     Comment: occ. drink     Drug use: No     Sexual activity: None   Lifestyle     Physical activity:     Days per week: None     Minutes per session: None     Stress: None   Relationships     Social connections:     Talks on phone: None     Gets together: None     Attends Nondenominational service: None     Active member of club or organization: None     Attends meetings of clubs or organizations: None     Relationship status: None     Intimate partner violence:     Fear of current or ex partner: None     Emotionally abused: None     Physically abused: None     Forced sexual activity: None   Other Topics Concern     Parent/sibling w/ CABG, MI or angioplasty before 65F 55M? No      Service Not Asked     Blood Transfusions Not Asked     Caffeine Concern No     Comment: none     Occupational Exposure Not Asked     Hobby Hazards Not Asked     Sleep Concern No     Stress Concern Not Asked     Weight Concern Yes     Comment: weight loss     Special Diet Yes     Comment: watched his sodium intake     Back Care Not  "Asked     Exercise Yes     Comment: daily      Bike Helmet Not Asked     Seat Belt Not Asked     Self-Exams Not Asked   Social History Narrative     None       Review of Systems:  Skin:  Negative       Eyes:  Negative      ENT:  Negative      Respiratory:  Negative       Cardiovascular:  Negative      Gastroenterology: Negative      Genitourinary:  Negative      Musculoskeletal:  Negative      Neurologic:  Negative      Psychiatric:  Negative      Heme/Lymph/Imm:  Negative      Endocrine:  Negative        Physical Exam:  Vitals: /82   Pulse 64   Ht 1.778 m (5' 10\")   Wt 92.1 kg (203 lb)   BMI 29.13 kg/m       Constitutional:  cooperative, alert and oriented, well developed, well nourished, in no acute distress        Skin:  warm and dry to the touch, no apparent skin lesions or masses noted          Head:  normocephalic, no masses or lesions        Eyes:  pupils equal and round        Lymph:      ENT:  no pallor or cyanosis, dentition good        Neck:  carotid pulses are full and equal bilaterally, JVP normal, no carotid bruit        Respiratory:  clear to auscultation         Cardiac: regular rhythm, normal S1/S2, no S3 or S4, apical impulse not displaced, no murmurs, gallops or rubs                pulses full and equal                                        GI:  BS normoactive;abdomen soft        Extremities and Muscular Skeletal:  no edema;no deformities, clubbing, cyanosis, erythema observed              Neurological:  no gross motor deficits;affect appropriate        Psych:  Alert and Oriented x 3        CC  Jose Mata MD  [address]  [place] [address]                Thank you for allowing me to participate in the care of your patient.      Sincerely,     JOSE MATA MD     [facility]    cc:   Jose Mata MD  [address]  [place] [address]        "

## 2020-02-05 DIAGNOSIS — I42.8 NONISCHEMIC CARDIOMYOPATHY (H): ICD-10-CM

## 2020-02-05 RX ORDER — LISINOPRIL 2.5 MG/1
2.5 TABLET ORAL 2 TIMES DAILY
Qty: 180 TABLET | Refills: 2 | Status: SHIPPED | OUTPATIENT
Start: 2020-02-05 | End: 2020-02-28

## 2020-02-27 ENCOUNTER — CARE COORDINATION (OUTPATIENT)
Dept: CARDIOLOGY | Facility: CLINIC | Age: 55
End: 2020-02-27

## 2020-02-27 DIAGNOSIS — I42.8 NONISCHEMIC CARDIOMYOPATHY (H): ICD-10-CM

## 2020-02-27 DIAGNOSIS — I10 ESSENTIAL HYPERTENSION, BENIGN: Primary | ICD-10-CM

## 2020-02-27 DIAGNOSIS — I10 ESSENTIAL HYPERTENSION, BENIGN: ICD-10-CM

## 2020-02-27 LAB
ANION GAP SERPL CALCULATED.3IONS-SCNC: 12.7 MMOL/L (ref 6–17)
BUN SERPL-MCNC: 17 MG/DL (ref 7–30)
CALCIUM SERPL-MCNC: 9.9 MG/DL (ref 8.5–10.5)
CHLORIDE SERPL-SCNC: 103 MMOL/L (ref 98–107)
CO2 SERPL-SCNC: 28 MMOL/L (ref 23–29)
CREAT SERPL-MCNC: 1.5 MG/DL (ref 0.7–1.3)
GFR SERPL CREATININE-BSD FRML MDRD: 48 ML/MIN/{1.73_M2}
GLUCOSE SERPL-MCNC: 106 MG/DL (ref 70–105)
POTASSIUM SERPL-SCNC: 3.7 MMOL/L (ref 3.5–5.1)
SODIUM SERPL-SCNC: 140 MMOL/L (ref 136–145)

## 2020-02-27 PROCEDURE — 80048 BASIC METABOLIC PNL TOTAL CA: CPT | Performed by: INTERNAL MEDICINE

## 2020-02-27 PROCEDURE — 36415 COLL VENOUS BLD VENIPUNCTURE: CPT | Performed by: INTERNAL MEDICINE

## 2020-02-27 NOTE — PROGRESS NOTES
Pt called and said he saw his BMP results on mychart and wants to know the plan. He is concerned that his creatinine has gone up more. I told pt that  mentioned in his OV note from 11/2019 that if renal function worsens pt may need to just go back to lisinopril 2.5 mg daily. Pt said he will go back to 2.5 mg daily until he hears from . Pt would like his BMP lab checked in a month. He did go from working evening shift at work to night shift, and states that he has not been drinking as many fluids on night shift. Pt going to focus on staying hydrated. He said his BP has been great, and typically around 117/70s, HR upper 50s. I told pt that an update has been sent to , and I will call him as soon as  gets back to me. Enma CAMARENA February 27, 2020, 11:56 AM

## 2020-02-27 NOTE — PROGRESS NOTES
BMP results from 2/27/20 noted. BMP done since pt's lisinopril was increased to 2.5 mg BID at 11/18/19 visit. Will message update to . Enma CAMARENA February 27, 2020, 10:01 AM      Component      Latest Ref Rng & Units 11/18/2019 2/27/2020   Sodium      136 - 145 mmol/L 142 140   Potassium      3.5 - 5.1 mmol/L 3.9 3.7   Chloride      98 - 107 mmol/L 104 103   Carbon Dioxide      23 - 29 mmol/L 30 (H) 28   Anion Gap      6 - 17 mmol/L 11.9 12.7   Glucose      70 - 105 mg/dL 103 106 (H)   Urea Nitrogen      7 - 30 mg/dL 19 17   Creatinine      0.70 - 1.30 mg/dL 1.33 (H) 1.50 (H)   GFR Estimate      >60 mL/min/1.73:m2 56 (L) 48 (L)   GFR Estimate If Black      >60 mL/min/1.73:m2 68 59 (L)   Calcium      8.5 - 10.5 mg/dL 9.7 9.9

## 2020-02-28 RX ORDER — LISINOPRIL 2.5 MG/1
2.5 TABLET ORAL DAILY
Qty: 90 TABLET | Refills: 3 | Status: SHIPPED | OUTPATIENT
Start: 2020-02-28 | End: 2021-08-05

## 2020-02-28 NOTE — TELEPHONE ENCOUNTER
I called pt and reviewed that  wants him to take lisinopril 2.5 mg daily. Recheck bmp lab in one month. Pt said he will focus on staying hydrated. He is trying to lose weight at this time too. He monitors his BP routinely and will call us if it trends up. Pt scheduled for bmp lab on 3/26 @ 2:00 in [place]. Enma CAMARENA February 28, 2020, 12:24 PM

## 2020-02-28 NOTE — TELEPHONE ENCOUNTER
Increased creatinine noted. Agree with going back to lisinopril 2.5 mg daily and recheck in one month after more consistent hydration. EE

## 2020-03-25 ENCOUNTER — TELEPHONE (OUTPATIENT)
Dept: LAB | Facility: CLINIC | Age: 55
End: 2020-03-25

## 2020-03-25 NOTE — TELEPHONE ENCOUNTER
Called pt to recommend changing [facility] appt to [facility] or [facility]. D/t COVID-19 outbreak, our clinic is limiting any in-clinic appointments to just the most vulnerable Cardiac patients who need to be seen in person. As such, in order to reduce the spread of germs in this vulnerable population, all stable patients or patients coming in only for labs are recommended to have labs checked through PCP at this time. Pt understanding of this and said he will call PCP office to make lab appt. for this week. We will f/u with his results through the phone. BMP order in chart. Will watch for results. Canceled our lab appt tomorrow. Sole Dean RN on 3/25/2020 at 3:17 PM

## 2020-03-25 NOTE — TELEPHONE ENCOUNTER
Wellness Screening Tool    Symptom Screening:    Do you have one of the following new symptoms:      Fever or reported chills?  No    A new cough (started within the past 14 days)? No    Shortness of breath (started within the past 14 days)? No    Nausea, vomiting or diarrhea?  No    Within the past 3 weeks, have you been exposed to someone with a known positive illness below?      COVID - 19 (known or suspected)  no    Chicken pox?  no    Measles?  no    Pertussis? No        Patient notified of visitor restriction:Yes     Patient's appointment status: Patient will be seen in clinic as scheduled on [id number]

## 2020-03-26 ENCOUNTER — TELEPHONE (OUTPATIENT)
Dept: CARDIOLOGY | Facility: CLINIC | Age: 55
End: 2020-03-26

## 2020-03-26 DIAGNOSIS — I10 ESSENTIAL HYPERTENSION, BENIGN: ICD-10-CM

## 2020-03-26 LAB
ANION GAP SERPL CALCULATED.3IONS-SCNC: 4 MMOL/L (ref 3–14)
BUN SERPL-MCNC: 20 MG/DL (ref 7–30)
CALCIUM SERPL-MCNC: 9.3 MG/DL (ref 8.5–10.1)
CHLORIDE SERPL-SCNC: 104 MMOL/L (ref 94–109)
CO2 SERPL-SCNC: 29 MMOL/L (ref 20–32)
CREAT SERPL-MCNC: 1.23 MG/DL (ref 0.66–1.25)
GFR SERPL CREATININE-BSD FRML MDRD: 65 ML/MIN/{1.73_M2}
GLUCOSE SERPL-MCNC: 96 MG/DL (ref 70–99)
POTASSIUM SERPL-SCNC: 4 MMOL/L (ref 3.4–5.3)
SODIUM SERPL-SCNC: 137 MMOL/L (ref 133–144)

## 2020-03-26 PROCEDURE — 36415 COLL VENOUS BLD VENIPUNCTURE: CPT | Performed by: INTERNAL MEDICINE

## 2020-03-26 PROCEDURE — 80048 BASIC METABOLIC PNL TOTAL CA: CPT | Performed by: INTERNAL MEDICINE

## 2020-03-26 NOTE — TELEPHONE ENCOUNTER
Call to pt review BMP lab done today. Pt was very happy to hear his kidney function has improved. Pt's lisinopril was decreased to 2.5 mg daily a month ago as he starting to develop some renal dysfunction. He said his BP is controlled on this lower dose and he's feeling well, so I told him to continue this same dose. He will be off work for 90 days, works for an [employer], d/t the coronavirus situation. He plans to spend time visiting family in [place]. He will call PRN for concerns. Updated labs sent to Dr. Mata as FYI. Sole Dean RN on 3/26/2020 at 4:46 PM      Component      Latest Ref Rng & Units 11/18/2019 2/27/2020 3/26/2020   Sodium      133 - 144 mmol/L 142 140 137   Potassium      3.4 - 5.3 mmol/L 3.9 3.7 4.0   Chloride      94 - 109 mmol/L 104 103 104   Carbon Dioxide      20 - 32 mmol/L 30 (H) 28 29   Anion Gap      3 - 14 mmol/L 11.9 12.7 4   Glucose      70 - 99 mg/dL 103 106 (H) 96   Urea Nitrogen      7 - 30 mg/dL 19 17 20   Creatinine      0.66 - 1.25 mg/dL 1.33 (H) 1.50 (H) 1.23   GFR Estimate      >60 mL/min/1.73:m2 56 (L) 48 (L) 65   GFR Estimate If Black      >60 mL/min/1.73:m2 68 59 (L) 76   Calcium      8.5 - 10.1 mg/dL 9.7 9.9 9.3

## 2020-04-30 DIAGNOSIS — I50.22 CHRONIC SYSTOLIC CONGESTIVE HEART FAILURE (H): ICD-10-CM

## 2020-04-30 RX ORDER — CARVEDILOL 25 MG/1
25 TABLET ORAL 2 TIMES DAILY WITH MEALS
Qty: 180 TABLET | Refills: 0 | Status: SHIPPED | OUTPATIENT
Start: 2020-04-30 | End: 2020-07-28

## 2020-06-12 ENCOUNTER — TELEPHONE (OUTPATIENT)
Dept: CARDIOLOGY | Facility: CLINIC | Age: 55
End: 2020-06-12

## 2020-06-15 ENCOUNTER — HOSPITAL ENCOUNTER (OUTPATIENT)
Dept: CARDIOLOGY | Facility: CLINIC | Age: 55
Discharge: HOME | End: 2020-06-15
Attending: INTERNAL MEDICINE | Admitting: INTERNAL MEDICINE
Payer: COMMERCIAL

## 2020-06-15 DIAGNOSIS — I42.8 NONISCHEMIC CARDIOMYOPATHY (H): ICD-10-CM

## 2020-06-15 PROCEDURE — 93306 TTE W/DOPPLER COMPLETE: CPT | Mod: 26 | Performed by: INTERNAL MEDICINE

## 2020-06-15 PROCEDURE — 93306 TTE W/DOPPLER COMPLETE: CPT

## 2020-06-16 DIAGNOSIS — I10 ESSENTIAL HYPERTENSION, BENIGN: ICD-10-CM

## 2020-06-16 LAB
ANION GAP SERPL CALCULATED.3IONS-SCNC: 2 MMOL/L (ref 3–14)
BUN SERPL-MCNC: 18 MG/DL (ref 7–30)
CALCIUM SERPL-MCNC: 9.1 MG/DL (ref 8.5–10.1)
CHLORIDE SERPL-SCNC: 106 MMOL/L (ref 94–109)
CO2 SERPL-SCNC: 30 MMOL/L (ref 20–32)
CREAT SERPL-MCNC: 1.27 MG/DL (ref 0.66–1.25)
GFR SERPL CREATININE-BSD FRML MDRD: 63 ML/MIN/{1.73_M2}
GLUCOSE SERPL-MCNC: 93 MG/DL (ref 70–99)
POTASSIUM SERPL-SCNC: 3.9 MMOL/L (ref 3.4–5.3)
SODIUM SERPL-SCNC: 138 MMOL/L (ref 133–144)

## 2020-06-16 PROCEDURE — 80048 BASIC METABOLIC PNL TOTAL CA: CPT | Performed by: INTERNAL MEDICINE

## 2020-06-16 PROCEDURE — 36415 COLL VENOUS BLD VENIPUNCTURE: CPT | Performed by: INTERNAL MEDICINE

## 2020-06-18 ENCOUNTER — VIRTUAL VISIT (OUTPATIENT)
Dept: CARDIOLOGY | Facility: CLINIC | Age: 55
End: 2020-06-18
Attending: INTERNAL MEDICINE
Payer: COMMERCIAL

## 2020-06-18 VITALS
SYSTOLIC BLOOD PRESSURE: 119 MMHG | WEIGHT: 208 LBS | BODY MASS INDEX: 29.78 KG/M2 | HEART RATE: 58 BPM | DIASTOLIC BLOOD PRESSURE: 82 MMHG | HEIGHT: 70 IN

## 2020-06-18 DIAGNOSIS — I42.8 NONISCHEMIC CARDIOMYOPATHY (H): ICD-10-CM

## 2020-06-18 DIAGNOSIS — E78.5 HYPERLIPIDEMIA WITH TARGET LDL LESS THAN 100: ICD-10-CM

## 2020-06-18 DIAGNOSIS — I77.810 MILD ASCENDING AORTA DILATION (H): Primary | ICD-10-CM

## 2020-06-18 PROCEDURE — 99214 OFFICE O/P EST MOD 30 MIN: CPT | Mod: 95 | Performed by: INTERNAL MEDICINE

## 2020-06-18 ASSESSMENT — MIFFLIN-ST. JEOR: SCORE: 1779.73

## 2020-06-18 NOTE — PROGRESS NOTES
"Orlin Griggs is a 55 year old male who is being evaluated via a billable video visit.      The patient has been notified of following:     \"This video visit will be conducted via a call between you and your physician/provider. We have found that certain health care needs can be provided without the need for an in-person physical exam.  This service lets us provide the care you need with a video conversation.  If a prescription is necessary we can send it directly to your pharmacy.  If lab work is needed we can place an order for that and you can then stop by our lab to have the test done at a later time.    Video visits are billed at different rates depending on your insurance coverage.  Please reach out to your insurance provider with any questions.    If during the course of the call the physician/provider feels a video visit is not appropriate, you will not be charged for this service.\"    Patient has given verbal consent for Video visit? Yes    How would you like to obtain your AVS? Mail a copy  Patient would like the video invitation sent by: Text to cell phone: [phone]    Will anyone else be joining your video visit? No      Bp:119/82  Pulse:58  Wt:208.0lb    Review Of Systems  Skin: NEGATIVE  Eyes:Ears/Nose/Throat: Glasses  Respiratory: NEGATIVE  Cardiovascular:Negative  Gastrointestinal: NEGATIVE  Genitourinary:NEGATIVE   Musculoskeletal: NEGATIVE  Neurologic: NEGATIVE  Psychiatric: NEGATIVE  Hematologic/Lymphatic/Immunologic: NEGATIVE  Endocrine:  NEGATIVE    Thelma SHAW    Video-Visit Details    Type of service:  Video Visit    Video Start Time: 9:38  Video End Time: 9:59 AM    Originating Location (pt. Location): Home    Distant Location (provider location):  [facility]     Platform used for Video Visit: MEENA Olivas MD    HPI and Plan:   See dictation    Orders Placed This Encounter   Procedures     Basic metabolic panel     Lipid " Profile     ALT     Basic metabolic panel     Follow-Up with Cardiologist     Echocardiogram Complete     No orders of the defined types were placed in this encounter.    There are no discontinued medications.      Encounter Diagnoses   Name Primary?     Nonischemic cardiomyopathy (H)      Mild ascending aorta dilation (H) Yes     Hyperlipidemia with target LDL less than 100        CURRENT MEDICATIONS:  Current Outpatient Medications   Medication Sig Dispense Refill     carvedilol (COREG) 25 MG tablet Take 1 tablet (25 mg) by mouth 2 times daily (with meals) 180 tablet 0     lisinopril (ZESTRIL) 2.5 MG tablet Take 1 tablet (2.5 mg) by mouth daily 90 tablet 3       ALLERGIES   No Known Allergies    PAST MEDICAL HISTORY:  Past Medical History:   Diagnosis Date     Acute renal insufficiency      Anxiety      CHF (congestive heart failure), NYHA class I (H) 02/20/2015    2/21/15 Echo shows EF 30-35%, 4/14/15 Echo shows EF improved to 43-46%     Essential hypertension, benign 7/29/2015     History of cocaine use     prior     Hyperlipidemia LDL goal < 100      LBBB (left bundle branch block)      Mild ascending aorta dilation (H) 3/9/2016    3.9cm per echo 3/9/16      Nonischemic cardiomyopathy (H)     idiopathic nonischemic cardiopmyopathy; 3/9/16 Echo:  EF 45-50%; 2/21/15: EF 30-35%, 4/14/15: EF improved to 43-46%.          PAST SURGICAL HISTORY:  Past Surgical History:   Procedure Laterality Date     CHOLECYSTECTOMY         FAMILY HISTORY:  Family History   Problem Relation Age of Onset     Breast Cancer Mother      Cancer Father      Heart Disease Brother         pacemaker     Pacemaker Brother      Family History Negative Brother      Family History Negative Brother      Coronary Artery Disease No family hx of      Diabetes No family hx of        SOCIAL HISTORY:  Social History     Socioeconomic History     Marital status: Single     Spouse name: None     Number of children: None     Years of education: None      "Highest education level: None   Occupational History     None   Social Needs     Financial resource strain: None     Food insecurity     Worry: None     Inability: None     Transportation needs     Medical: None     Non-medical: None   Tobacco Use     Smoking status: Never Smoker     Smokeless tobacco: Never Used   Substance and Sexual Activity     Alcohol use: Yes     Comment: occ. drink     Drug use: No     Sexual activity: None   Lifestyle     Physical activity     Days per week: None     Minutes per session: None     Stress: None   Relationships     Social connections     Talks on phone: None     Gets together: None     Attends Faith service: None     Active member of club or organization: None     Attends meetings of clubs or organizations: None     Relationship status: None     Intimate partner violence     Fear of current or ex partner: None     Emotionally abused: None     Physically abused: None     Forced sexual activity: None   Other Topics Concern     Parent/sibling w/ CABG, MI or angioplasty before 65F 55M? No      Service Not Asked     Blood Transfusions Not Asked     Caffeine Concern No     Comment: none     Occupational Exposure Not Asked     Hobby Hazards Not Asked     Sleep Concern No     Stress Concern Not Asked     Weight Concern Yes     Comment: weight loss     Special Diet Yes     Comment: watched his sodium intake     Back Care Not Asked     Exercise Yes     Comment: daily      Bike Helmet Not Asked     Seat Belt Not Asked     Self-Exams Not Asked   Social History Narrative     None       Physical Exam:  Vitals: /82   Pulse 58   Ht 1.778 m (5' 10\")   Wt 94.3 kg (208 lb)   BMI 29.84 kg/m      Constitutional:           Skin:             Head:           Eyes:           Lymph:      ENT:           Neck:           Respiratory:            Cardiac:                                                           GI:           Extremities and Muscular Skeletal:             "     Neurological:           Psych:         Recent Lab Results:  LIPID RESULTS:  Lab Results   Component Value Date    CHOL 171 05/06/2019    HDL 45 05/06/2019     (H) 05/06/2019    TRIG 126 05/06/2019    CHOLHDLRATIO 4.0 10/26/2015       LIVER ENZYME RESULTS:  Lab Results   Component Value Date    AST 34 02/19/2015    ALT 16 05/06/2019       CBC RESULTS:  Lab Results   Component Value Date    WBC 6.8 10/23/2018    RBC 4.48 10/23/2018    HGB 13.7 10/23/2018    HCT 40.3 10/23/2018    MCV 90 10/23/2018    MCH 30.6 10/23/2018    MCHC 34.0 10/23/2018    RDW 13.4 10/23/2018     10/23/2018       BMP RESULTS:  Lab Results   Component Value Date     06/16/2020    POTASSIUM 3.9 06/16/2020    CHLORIDE 106 06/16/2020    CO2 30 06/16/2020    ANIONGAP 2 (L) 06/16/2020    GLC 93 06/16/2020    BUN 18 06/16/2020    CR 1.27 (H) 06/16/2020    GFRESTIMATED 63 06/16/2020    GFRESTBLACK 73 06/16/2020    PREET 9.1 06/16/2020        A1C RESULTS:  No results found for: A1C    INR RESULTS:  No results found for: INR        CC  Lyndon Steve MD  [address] [place] [address]

## 2020-06-18 NOTE — PROGRESS NOTES
Service Date: 06/18/2020      HISTORY OF PRESENT ILLNESS:  I had the opportunity to see Mr. Orlin Griggs in Cardiology Clinic today at the [facility] in [place] for reevaluation of nonischemic cardiomyopathy and mild dilation of the ascending aorta.  Mr. Griggs has been doing well.  He had a few palpitations recently, but no chest discomfort, shortness of breath, lightheadedness or syncope.      He has continued to exercise vigorously.  He has been hiking on steep hills and doing some light weightlifting without any concern or symptoms.  He has taken a leave from work and has basically been focusing on his health and lifestyle.      He repeated an echocardiogram prior to this visit, which looks very stable.  His ejection fraction was 50%-55%, which is slightly improved compared to our last echo in 2017, which suggested an ejection fraction of 45%-50%.  The echo this year is more consistent with the cardiac MRI from 2019, which suggested an ejection fraction of 53%.  Left ventricular chamber size is normal.  Ascending canal aorta measures 4.2 cm, which is stable by echo.  Previously, it was thought to be 4.3 cm and at the same time was measured at 4.0 cm by MRI.      His basic metabolic panel looks stable as well with perhaps some slight improvement in kidney function.  His creatinine is 1.27 with a GFR of 63.  His sodium and potassium are normal.      PHYSICAL EXAMINATION:  Today his blood pressure is 119/82, heart rate 58 and weight 208 pounds.      IMPRESSIONS:  Mr. Orlin Griggs is a 55-year-old gentleman with a history of nonischemic cardiomyopathy, which improved significantly with medical therapy, including carvedilol and lisinopril.  It now appears to be stable for several years with low-normal left ventricular function.  His ejection fraction is 50%-55%.  He has no significant valvular disease.  He has minimal dilation of the ascending aorta, which looks quite stable.  His kidney function  has run borderline abnormal with a creatinine now slightly improved at 1.27.  He is hoping to try stopping his lisinopril, which is only a low dose of 2.5 mg daily.  I suggested he could try doing that.  I doubt that it is helping him very much at this point.  We will recheck a basic metabolic panel in 3 months and repeat an echocardiogram next year before I see him back again in clinic.      cc:   [place] [address]         MEENA AGGARWAL MD, [facility]             D: 2020   T: 2020   MT: melissa      Name:     ISHA PANIAGUA   MRN:      [id number]        Account:      [id number]   :      1965           Service Date: 2020      Document: [id number]

## 2020-06-18 NOTE — LETTER
6/18/2020    Lyndon Steve MD  [address] [facility] [address]    RE: Orlin Griggs       Dear Colleague,    I had the pleasure of seeing Orlin Griggs in the [facility].    Orlin Griggs is a 55 year old male who is being evaluated via a billable video visit.        Orders Placed This Encounter   Procedures     Basic metabolic panel     Lipid Profile     ALT     Basic metabolic panel     Follow-Up with Cardiologist     Echocardiogram Complete     No orders of the defined types were placed in this encounter.    There are no discontinued medications.      Encounter Diagnoses   Name Primary?     Nonischemic cardiomyopathy (H)      Mild ascending aorta dilation (H) Yes     Hyperlipidemia with target LDL less than 100        CURRENT MEDICATIONS:  Current Outpatient Medications   Medication Sig Dispense Refill     carvedilol (COREG) 25 MG tablet Take 1 tablet (25 mg) by mouth 2 times daily (with meals) 180 tablet 0     lisinopril (ZESTRIL) 2.5 MG tablet Take 1 tablet (2.5 mg) by mouth daily 90 tablet 3       ALLERGIES   No Known Allergies    PAST MEDICAL HISTORY:  Past Medical History:   Diagnosis Date     Acute renal insufficiency      Anxiety      CHF (congestive heart failure), NYHA class I (H) 02/20/2015    2/21/15 Echo shows EF 30-35%, 4/14/15 Echo shows EF improved to 43-46%     Essential hypertension, benign 7/29/2015     History of cocaine use     prior     Hyperlipidemia LDL goal < 100      LBBB (left bundle branch block)      Mild ascending aorta dilation (H) 3/9/2016    3.9cm per echo 3/9/16      Nonischemic cardiomyopathy (H)     idiopathic nonischemic cardiopmyopathy; 3/9/16 Echo:  EF 45-50%; 2/21/15: EF 30-35%, 4/14/15: EF improved to 43-46%.          PAST SURGICAL HISTORY:  Past Surgical History:   Procedure Laterality Date     CHOLECYSTECTOMY         FAMILY HISTORY:  Family History   Problem Relation Age of Onset     Breast Cancer Mother      Cancer Father       Heart Disease Brother         pacemaker     Pacemaker Brother      Family History Negative Brother      Family History Negative Brother      Coronary Artery Disease No family hx of      Diabetes No family hx of        SOCIAL HISTORY:  Social History     Socioeconomic History     Marital status: Single     Spouse name: None     Number of children: None     Years of education: None     Highest education level: None   Occupational History     None   Social Needs     Financial resource strain: None     Food insecurity     Worry: None     Inability: None     Transportation needs     Medical: None     Non-medical: None   Tobacco Use     Smoking status: Never Smoker     Smokeless tobacco: Never Used   Substance and Sexual Activity     Alcohol use: Yes     Comment: occ. drink     Drug use: No     Sexual activity: None   Lifestyle     Physical activity     Days per week: None     Minutes per session: None     Stress: None   Relationships     Social connections     Talks on phone: None     Gets together: None     Attends Congregation service: None     Active member of club or organization: None     Attends meetings of clubs or organizations: None     Relationship status: None     Intimate partner violence     Fear of current or ex partner: None     Emotionally abused: None     Physically abused: None     Forced sexual activity: None   Other Topics Concern     Parent/sibling w/ CABG, MI or angioplasty before 65F 55M? No      Service Not Asked     Blood Transfusions Not Asked     Caffeine Concern No     Comment: none     Occupational Exposure Not Asked     Hobby Hazards Not Asked     Sleep Concern No     Stress Concern Not Asked     Weight Concern Yes     Comment: weight loss     Special Diet Yes     Comment: watched his sodium intake     Back Care Not Asked     Exercise Yes     Comment: daily      Bike Helmet Not Asked     Seat Belt Not Asked     Self-Exams Not Asked   Social History Narrative     None       Physical  "Exam:  Vitals: /82   Pulse 58   Ht 1.778 m (5' 10\")   Wt 94.3 kg (208 lb)   BMI 29.84 kg/m      Constitutional:           Skin:             Head:           Eyes:           Lymph:      ENT:           Neck:           Respiratory:            Cardiac:                                                           GI:           Extremities and Muscular Skeletal:                 Neurological:           Psych:         Recent Lab Results:  LIPID RESULTS:  Lab Results   Component Value Date    CHOL 171 05/06/2019    HDL 45 05/06/2019     (H) 05/06/2019    TRIG 126 05/06/2019    CHOLHDLRATIO 4.0 10/26/2015       LIVER ENZYME RESULTS:  Lab Results   Component Value Date    AST 34 02/19/2015    ALT 16 05/06/2019       CBC RESULTS:  Lab Results   Component Value Date    WBC 6.8 10/23/2018    RBC 4.48 10/23/2018    HGB 13.7 10/23/2018    HCT 40.3 10/23/2018    MCV 90 10/23/2018    MCH 30.6 10/23/2018    MCHC 34.0 10/23/2018    RDW 13.4 10/23/2018     10/23/2018       BMP RESULTS:  Lab Results   Component Value Date     06/16/2020    POTASSIUM 3.9 06/16/2020    CHLORIDE 106 06/16/2020    CO2 30 06/16/2020    ANIONGAP 2 (L) 06/16/2020    GLC 93 06/16/2020    BUN 18 06/16/2020    CR 1.27 (H) 06/16/2020    GFRESTIMATED 63 06/16/2020    GFRESTBLACK 73 06/16/2020    PREET 9.1 06/16/2020        A1C RESULTS:  No results found for: A1C    INR RESULTS:  No results found for: INR      Service Date: 06/18/2020      HISTORY OF PRESENT ILLNESS:  I had the opportunity to see Mr. Orlin Griggs in Cardiology Clinic today at the [facility] in [place] for reevaluation of nonischemic cardiomyopathy and mild dilation of the ascending aorta.  Mr. Griggs has been doing well.  He had a few palpitations recently, but no chest discomfort, shortness of breath, lightheadedness or syncope.      He has continued to exercise vigorously.  He has been hiking on steep hills and doing some light weightlifting without any " concern or symptoms.  He has taken a leave from work and has basically been focusing on his health and lifestyle.      He repeated an echocardiogram prior to this visit, which looks very stable.  His ejection fraction was 50%-55%, which is slightly improved compared to our last echo in 2017, which suggested an ejection fraction of 45%-50%.  The echo this year is more consistent with the cardiac MRI from 2019, which suggested an ejection fraction of 53%.  Left ventricular chamber size is normal.  Ascending canal aorta measures 4.2 cm, which is stable by echo.  Previously, it was thought to be 4.3 cm and at the same time was measured at 4.0 cm by MRI.      His basic metabolic panel looks stable as well with perhaps some slight improvement in kidney function.  His creatinine is 1.27 with a GFR of 63.  His sodium and potassium are normal.      PHYSICAL EXAMINATION:  Today his blood pressure is 119/82, heart rate 58 and weight 208 pounds.      IMPRESSIONS:  Mr. Orlin Griggs is a 55-year-old gentleman with a history of nonischemic cardiomyopathy, which improved significantly with medical therapy, including carvedilol and lisinopril.  It now appears to be stable for several years with low-normal left ventricular function.  His ejection fraction is 50%-55%.  He has no significant valvular disease.  He has minimal dilation of the ascending aorta, which looks quite stable.  His kidney function has run borderline abnormal with a creatinine now slightly improved at 1.27.  He is hoping to try stopping his lisinopril, which is only a low dose of 2.5 mg daily.  I suggested he could try doing that.  I doubt that it is helping him very much at this point.  We will recheck a basic metabolic panel in 3 months and repeat an echocardiogram next year before I see him back again in clinic.        Thank you for allowing me to participate in the care of your patient.    Sincerely,     MEENA AGGARWAL MD     [facility]  Heart Care

## 2020-07-28 DIAGNOSIS — I50.22 CHRONIC SYSTOLIC CONGESTIVE HEART FAILURE (H): ICD-10-CM

## 2020-07-28 RX ORDER — CARVEDILOL 25 MG/1
25 TABLET ORAL 2 TIMES DAILY WITH MEALS
Qty: 180 TABLET | Refills: 2 | Status: SHIPPED | OUTPATIENT
Start: 2020-07-28 | End: 2021-04-27

## 2020-07-29 ENCOUNTER — OFFICE VISIT (OUTPATIENT)
Dept: INTERNAL MEDICINE | Facility: CLINIC | Age: 55
End: 2020-07-29
Payer: COMMERCIAL

## 2020-07-29 VITALS
BODY MASS INDEX: 30.58 KG/M2 | TEMPERATURE: 97.2 F | DIASTOLIC BLOOD PRESSURE: 86 MMHG | HEART RATE: 68 BPM | WEIGHT: 213.6 LBS | SYSTOLIC BLOOD PRESSURE: 128 MMHG | HEIGHT: 70 IN | OXYGEN SATURATION: 100 %

## 2020-07-29 DIAGNOSIS — I50.22 CHRONIC SYSTOLIC CONGESTIVE HEART FAILURE (H): ICD-10-CM

## 2020-07-29 DIAGNOSIS — R10.9 FLANK PAIN: ICD-10-CM

## 2020-07-29 DIAGNOSIS — M54.9 UPPER BACK PAIN ON RIGHT SIDE: Primary | ICD-10-CM

## 2020-07-29 LAB
ALBUMIN UR-MCNC: NEGATIVE MG/DL
APPEARANCE UR: CLEAR
BILIRUB UR QL STRIP: NEGATIVE
COLOR UR AUTO: YELLOW
GLUCOSE UR STRIP-MCNC: NEGATIVE MG/DL
HGB UR QL STRIP: NEGATIVE
KETONES UR STRIP-MCNC: NEGATIVE MG/DL
LEUKOCYTE ESTERASE UR QL STRIP: NEGATIVE
NITRATE UR QL: NEGATIVE
PH UR STRIP: 6.5 PH (ref 5–7)
SOURCE: NORMAL
SP GR UR STRIP: <=1.005 (ref 1–1.03)
UROBILINOGEN UR STRIP-ACNC: 0.2 EU/DL (ref 0.2–1)

## 2020-07-29 PROCEDURE — 81003 URINALYSIS AUTO W/O SCOPE: CPT | Performed by: INTERNAL MEDICINE

## 2020-07-29 PROCEDURE — 99203 OFFICE O/P NEW LOW 30 MIN: CPT | Performed by: INTERNAL MEDICINE

## 2020-07-29 ASSESSMENT — MIFFLIN-ST. JEOR: SCORE: 1805.13

## 2020-07-29 NOTE — PATIENT INSTRUCTIONS
"  UPPER BACK/MYOFASCIAL PAIN:     *  Based on your history and exam,  No evidence for herniated disc, spinal stenosis, or vertebral fracture or any other concerning cause.    *  Over the counter Anti-inflammatory medicine.  Take one of the following:     --Aleve 2 tablets twice per day (with food) every day for the next one week, then twice per day as needed.     OR   --Motrin/Advil/Ibuprofen 400-600 mg three times pre day for the next one week, then 2-3 times per day as needed    *  Physical therapy through [facility] (many locations throughout the [place] and [facility]) as needed if you do not get better.   Contact us for a referral if you decide to have our phsyical therapists help you.      *  At a minimum, performing simple stretches of the upper back, shoulder, neck areas.    Google \"upper back stretching exercises\" and review that information, including videos on how to stretch these tissues.     *  Moist heat in the form of a shower, hot towel, or microwavable bean bag few times per day can help relieve some of the spasm.  One example is a \"Bed Buddy\" available at most drug stores such as [employer].  See the web page :  Http://www.[employer]/products/detail/292 for more details.    *  Aspercreme patches with Lidocaine (available over the counter)           *  Expect your upper back and neck areas to be more easily re-aggravated over the next couple of weeks to months.  the soft tissue and muscles are going to be more easily re-irritated over the next several weeks so be careful to return to physical action slowly.    *  If your symptoms worsen, do not improve, or If you develop worsening or concerning numbness, tingling or weakness in your hands, fingers, or arms, contact us.                 "

## 2020-07-29 NOTE — PROGRESS NOTES
Subjective     Orlin Griggs is a 55 year old male who presents to clinic today for the following health issues:    HPI       Back Pain       Duration: x2-3 weeks        Specific cause: none    Description:   Location of pain: low back right and middle of back right and RT side  Character of pain: dull ache, gnawing and cramping  Pain radiation:none  New numbness or weakness in legs, not attributed to pain:  no     Intensity: Currently 6.5/10, At its worst 8.5/10    History:   Pain interferes with job: Not applicable  History of back problems: no prior back problems  Any previous MRI or X-rays: None  Sees a specialist for back pain:  No  Therapies tried without relief:     Alleviating factors:   Improved by: ibuprofen temporary relieves most of the symptoms.    Precipitating factors:  Worsened by: Nothing    **Patient started boxing at a local gym approximate 3 weeks ago.  He has been using an [employer] stationary bicycle aggressively over the last few weeks as well.        Accompanying Signs & Symptoms:  Risk of Fracture:  None  Risk of Cauda Equina:  None  Risk of Infection:  None  Risk of Cancer:  None  Risk of Ankylosing Spondylitis:  Onset at age <35, male, AND morning back stiffness. no       2.  History of congestive heart failure with cardiomyopathy.  Currently taking lisinopril and carvedilol through the cardiology clinic reviewed most recent echocardiogram with the patient.  He denies signs and symptoms of congestive heart failure.          Past Medical History:  ---------------------------  Past Medical History:   Diagnosis Date     Acute renal insufficiency      Anxiety      CHF (congestive heart failure), NYHA class I (H) 02/20/2015    2/21/15 Echo shows EF 30-35%, 4/14/15 Echo shows EF improved to 43-46%     Essential hypertension, benign 7/29/2015     History of cocaine use     prior     Hyperlipidemia LDL goal < 100      LBBB (left bundle branch block)      Mild ascending aorta dilation (H) 3/9/2016     3.9cm per echo 3/9/16      Nonischemic cardiomyopathy (H)     idiopathic nonischemic cardiopmyopathy; 3/9/16 Echo:  EF 45-50%; 2/21/15: EF 30-35%, 4/14/15: EF improved to 43-46%.          Past Surgical History:  ---------------------------  Past Surgical History:   Procedure Laterality Date     CHOLECYSTECTOMY         Current Medications:  ---------------------------  Current Outpatient Medications   Medication Sig Dispense Refill     carvedilol (COREG) 25 MG tablet Take 1 tablet (25 mg) by mouth 2 times daily (with meals) 180 tablet 2     lisinopril (ZESTRIL) 2.5 MG tablet Take 1 tablet (2.5 mg) by mouth daily 90 tablet 3       Allergies:  -------------  No Known Allergies    Social History:  -------------------  Social History     Socioeconomic History     Marital status: Single     Spouse name: Not on file     Number of children: Not on file     Years of education: Not on file     Highest education level: Not on file   Occupational History     Not on file   Social Needs     Financial resource strain: Not on file     Food insecurity     Worry: Not on file     Inability: Not on file     Transportation needs     Medical: Not on file     Non-medical: Not on file   Tobacco Use     Smoking status: Never Smoker     Smokeless tobacco: Never Used   Substance and Sexual Activity     Alcohol use: Yes     Comment: occ. drink     Drug use: No     Sexual activity: Not on file   Lifestyle     Physical activity     Days per week: Not on file     Minutes per session: Not on file     Stress: Not on file   Relationships     Social connections     Talks on phone: Not on file     Gets together: Not on file     Attends Quaker service: Not on file     Active member of club or organization: Not on file     Attends meetings of clubs or organizations: Not on file     Relationship status: Not on file     Intimate partner violence     Fear of current or ex partner: Not on file     Emotionally abused: Not on file     Physically abused:  "Not on file     Forced sexual activity: Not on file   Other Topics Concern     Parent/sibling w/ CABG, MI or angioplasty before 65F 55M? No      Service Not Asked     Blood Transfusions Not Asked     Caffeine Concern No     Comment: none     Occupational Exposure Not Asked     Hobby Hazards Not Asked     Sleep Concern No     Stress Concern Not Asked     Weight Concern Yes     Comment: weight loss     Special Diet Yes     Comment: watched his sodium intake     Back Care Not Asked     Exercise Yes     Comment: daily      Bike Helmet Not Asked     Seat Belt Not Asked     Self-Exams Not Asked   Social History Narrative     Not on file       Family Medical History:  ------------------------------  Family History   Problem Relation Age of Onset     Breast Cancer Mother      Cancer Father      Heart Disease Brother         pacemaker     Pacemaker Brother      Family History Negative Brother      Family History Negative Brother      Coronary Artery Disease No family hx of      Diabetes No family hx of         Reviewed and updated as needed this visit by Provider         Review of Systems   Constitutional, HEENT, cardiovascular, pulmonary, gi and gu systems are negative, except as otherwise noted.      Objective    /86   Pulse 68   Temp 97.2  F (36.2  C) (Temporal)   Ht 1.778 m (5' 10\")   Wt 96.9 kg (213 lb 9.6 oz)   SpO2 100%   BMI 30.65 kg/m    Body mass index is 30.65 kg/m .  Physical Exam   GENERAL alert and no distress  EYES:  Normal sclera,conjunctiva, EOMI  HENT: oral and posterior pharynx without lesions or erythema, facies symmetric  NECK: Neck supple. No LAD, without thyroidmegaly.  RESP: Clear to ausculation bilaterally without wheezes or crackles. Normal BS in all fields.  CV: RRR normal S1S2 without murmurs, rubs or gallops.  LYMPH: no cervical lymph adenopathy appreciated  MS: extremities- no gross deformities of the visible extremities noted,   EXT:  no lower extremity edema  PSYCH: Alert " and oriented times 3; speech- coherent  SKIN:  No obvious significant skin lesions on visible portions of face     BACK:  Pain to palpation of the muscle of the upper back, particularly the right rhomboid just belwo scapula.  The muscles in the upper mid right back are in spasm and tight.  Palpation here exactly reproduces their pain.  No pain palpation of the vertebral bodies themselves.     Diagnostic Test Results:  Labs reviewed in Epic    Results for orders placed or performed in visit on 07/29/20   *UA reflex to Microscopic and Culture ([place] [facility] (except [place] and [place])     Status: None    Specimen: Midstream Urine   Result Value Ref Range    Color Urine Yellow     Appearance Urine Clear     Glucose Urine Negative NEG^Negative mg/dL    Bilirubin Urine Negative NEG^Negative    Ketones Urine Negative NEG^Negative mg/dL    Specific Gravity Urine <=1.005 1.003 - 1.035    Blood Urine Negative NEG^Negative    pH Urine 6.5 5.0 - 7.0 pH    Protein Albumin Urine Negative NEG^Negative mg/dL    Urobilinogen Urine 0.2 0.2 - 1.0 EU/dL    Nitrite Urine Negative NEG^Negative    Leukocyte Esterase Urine Negative NEG^Negative    Source Midstream Urine               (M54.9) Upper back pain on right side  (primary encounter diagnosis)  Comment: Upper back pain with fair amount of myofascial pain.  Most likely due to the patient's new working out regimen involving boxing and [employer]odyne bicycle/  No evidence for spinal or vertebral pathology.  No imaging indicated at this time.   Discussed the typical causes for these pains and recommended proper ergonomics and body mechanical issues.   Recommended moist heat, NSAIDs if able to tolerate, stretching, massage/trigger point release, and physical therapy if the patient desires.  Told the patient to return if there are any changes in the symptoms worsen, change in any way, or fail to respond to these conservative measures.     Plan:     (R10.9) Flank  pain  Comment: No evidence for kidney stone, renal infection, or kidney involvement.  These are the most concerning items for the patient.  Plan: *UA reflex to Microscopic and Culture ([place]         [facility] (except [place] and         [place])            (I50.22) Chronic systolic congestive heart failure (H)  Comment: Doing well.  Reviewed most recent cardiology note, continue current medications.  Plan:

## 2020-09-02 ENCOUNTER — TELEPHONE (OUTPATIENT)
Dept: INTERNAL MEDICINE | Facility: CLINIC | Age: 55
End: 2020-09-02

## 2020-09-02 DIAGNOSIS — Z12.5 SCREENING FOR PROSTATE CANCER: Primary | ICD-10-CM

## 2020-09-02 NOTE — TELEPHONE ENCOUNTER
Reason for Call: Request for an order or referral:  Order or referral being requested: lab order for psa  Date needed: as soon as possible  Has the patient been seen by the PCP for this problem? YES  Additional comments: patient has a lab appointment on 9/4/2020 from Dr Mata would like a psa draw at that time  Phone number Patient can be reached at:  Home number on file [phone] (home)  Best Time:  any  Can we leave a detailed message on this number?  YES  Call taken on 9/2/2020 at 11:14 AM by SHIVAM HURTADO

## 2020-09-02 NOTE — TELEPHONE ENCOUNTER
I added a PSA level that can be done at the same time.  The lab should see my order in additio nto the other blood tests.     Close encounter when done.

## 2020-09-03 DIAGNOSIS — Z12.5 SCREENING FOR PROSTATE CANCER: ICD-10-CM

## 2020-09-03 DIAGNOSIS — I42.8 NONISCHEMIC CARDIOMYOPATHY (H): ICD-10-CM

## 2020-09-03 PROCEDURE — G0103 PSA SCREENING: HCPCS | Performed by: INTERNAL MEDICINE

## 2020-09-03 PROCEDURE — 80048 BASIC METABOLIC PNL TOTAL CA: CPT | Performed by: INTERNAL MEDICINE

## 2020-09-03 PROCEDURE — 36415 COLL VENOUS BLD VENIPUNCTURE: CPT | Performed by: INTERNAL MEDICINE

## 2020-09-04 ENCOUNTER — CARE COORDINATION (OUTPATIENT)
Dept: CARDIOLOGY | Facility: CLINIC | Age: 55
End: 2020-09-04

## 2020-09-04 ENCOUNTER — DOCUMENTATION ONLY (OUTPATIENT)
Dept: CARDIOLOGY | Facility: CLINIC | Age: 55
End: 2020-09-04

## 2020-09-04 LAB
ANION GAP SERPL CALCULATED.3IONS-SCNC: 5 MMOL/L (ref 3–14)
BUN SERPL-MCNC: 19 MG/DL (ref 7–30)
CALCIUM SERPL-MCNC: 9.1 MG/DL (ref 8.5–10.1)
CHLORIDE SERPL-SCNC: 101 MMOL/L (ref 94–109)
CO2 SERPL-SCNC: 27 MMOL/L (ref 20–32)
CREAT SERPL-MCNC: 1.25 MG/DL (ref 0.66–1.25)
GFR SERPL CREATININE-BSD FRML MDRD: 64 ML/MIN/{1.73_M2}
GLUCOSE SERPL-MCNC: 84 MG/DL (ref 70–99)
POTASSIUM SERPL-SCNC: 4 MMOL/L (ref 3.4–5.3)
PSA SERPL-ACNC: 3.53 UG/L (ref 0–4)
SODIUM SERPL-SCNC: 133 MMOL/L (ref 133–144)

## 2020-09-04 NOTE — LETTER
September 4, 2020   TO: Orlin Griggs   [id number]  [facility] [address]     Dear Mr. Griggs:     Component      Latest Ref Rng & Units 9/3/2020   Sodium      133 - 144 mmol/L 133   Potassium      3.4 - 5.3 mmol/L 4.0   Chloride      94 - 109 mmol/L 101   Carbon Dioxide      20 - 32 mmol/L 27   Anion Gap      3 - 14 mmol/L 5   Glucose      70 - 99 mg/dL 84   Urea Nitrogen      7 - 30 mg/dL 19   Creatinine      0.66 - 1.25 mg/dL 1.25   GFR Estimate      >60 mL/min/1.73:m2 64   GFR Estimate If Black      >60 mL/min/1.73:m2 74   Calcium      8.5 - 10.1 mg/dL 9.1   Please continue your current treatment plan.   Sincerely,  [facility] [place]

## 2020-09-04 NOTE — PROGRESS NOTES
Pt called and left message to discuss his PSA and bmp results. I called pt back. Pt's bmp stable. Pt wanted to know if  is aware of his PSA results. I told pt that his PCP, , ordered the PSA and should be calling him with further recommendations. Enma CAMARENA September 4, 2020, 12:42 PM

## 2020-09-29 ENCOUNTER — CARE COORDINATION (OUTPATIENT)
Dept: CARDIOLOGY | Facility: CLINIC | Age: 55
End: 2020-09-29

## 2021-04-27 DIAGNOSIS — I50.22 CHRONIC SYSTOLIC CONGESTIVE HEART FAILURE (H): ICD-10-CM

## 2021-04-27 RX ORDER — CARVEDILOL 25 MG/1
25 TABLET ORAL 2 TIMES DAILY WITH MEALS
Qty: 180 TABLET | Refills: 0 | Status: SHIPPED | OUTPATIENT
Start: 2021-04-27 | End: 2021-07-26

## 2021-06-10 ENCOUNTER — TRANSFERRED RECORDS (OUTPATIENT)
Dept: HEALTH INFORMATION MANAGEMENT | Facility: CLINIC | Age: 56
End: 2021-06-10

## 2021-06-23 DIAGNOSIS — I50.22 CHRONIC SYSTOLIC CONGESTIVE HEART FAILURE (H): Primary | ICD-10-CM

## 2021-06-23 DIAGNOSIS — E78.5 HYPERLIPIDEMIA WITH TARGET LDL LESS THAN 100: ICD-10-CM

## 2021-06-24 ENCOUNTER — CARE COORDINATION (OUTPATIENT)
Dept: CARDIOLOGY | Facility: CLINIC | Age: 56
End: 2021-06-24

## 2021-06-24 NOTE — PROGRESS NOTES
Pt left message stating he is frustrated he can't get in to see  until October. Pt is due for echo, labs, and visit in June. I reviewed with  and he said it's ok for pt to be added onto a NUC day. I called pt with an update. 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 I will have scheduling call pt this afternoon to set up echo, labs, and visit. Pt said he has been feeling well and symptoms he is concerned about. Enma CAMARENA June 24, 2021, 10:45 AM

## 2021-07-26 DIAGNOSIS — I50.22 CHRONIC SYSTOLIC CONGESTIVE HEART FAILURE (H): ICD-10-CM

## 2021-07-26 RX ORDER — CARVEDILOL 25 MG/1
25 TABLET ORAL 2 TIMES DAILY WITH MEALS
Qty: 180 TABLET | Refills: 0 | Status: SHIPPED | OUTPATIENT
Start: 2021-07-26 | End: 2021-08-05

## 2021-07-30 ENCOUNTER — HOSPITAL ENCOUNTER (OUTPATIENT)
Dept: CARDIOLOGY | Facility: CLINIC | Age: 56
Discharge: HOME | End: 2021-07-30
Attending: INTERNAL MEDICINE | Admitting: INTERNAL MEDICINE
Payer: COMMERCIAL

## 2021-07-30 DIAGNOSIS — I50.22 CHRONIC SYSTOLIC CONGESTIVE HEART FAILURE (H): ICD-10-CM

## 2021-07-30 LAB
BI-PLANE LVEF ECHO: NORMAL
LVEF ECHO: NORMAL

## 2021-07-30 PROCEDURE — 93306 TTE W/DOPPLER COMPLETE: CPT

## 2021-07-30 PROCEDURE — 93306 TTE W/DOPPLER COMPLETE: CPT | Mod: 26 | Performed by: INTERNAL MEDICINE

## 2021-08-05 ENCOUNTER — OFFICE VISIT (OUTPATIENT)
Dept: CARDIOLOGY | Facility: CLINIC | Age: 56
End: 2021-08-05
Payer: COMMERCIAL

## 2021-08-05 ENCOUNTER — LAB (OUTPATIENT)
Dept: LAB | Facility: CLINIC | Age: 56
End: 2021-08-05
Payer: COMMERCIAL

## 2021-08-05 VITALS
DIASTOLIC BLOOD PRESSURE: 88 MMHG | SYSTOLIC BLOOD PRESSURE: 126 MMHG | BODY MASS INDEX: 28.98 KG/M2 | HEIGHT: 71 IN | WEIGHT: 207 LBS | HEART RATE: 68 BPM

## 2021-08-05 DIAGNOSIS — E78.5 HYPERLIPIDEMIA WITH TARGET LDL LESS THAN 100: ICD-10-CM

## 2021-08-05 DIAGNOSIS — I77.810 MILD ASCENDING AORTA DILATION (H): ICD-10-CM

## 2021-08-05 DIAGNOSIS — I42.8 NONISCHEMIC CARDIOMYOPATHY (H): Primary | ICD-10-CM

## 2021-08-05 DIAGNOSIS — I44.7 LBBB (LEFT BUNDLE BRANCH BLOCK): ICD-10-CM

## 2021-08-05 DIAGNOSIS — I50.22 CHRONIC SYSTOLIC CONGESTIVE HEART FAILURE (H): ICD-10-CM

## 2021-08-05 DIAGNOSIS — I10 ESSENTIAL HYPERTENSION, BENIGN: ICD-10-CM

## 2021-08-05 LAB
ALT SERPL W P-5'-P-CCNC: 34 U/L (ref 0–70)
ANION GAP SERPL CALCULATED.3IONS-SCNC: 3 MMOL/L (ref 3–14)
BUN SERPL-MCNC: 18 MG/DL (ref 7–30)
CALCIUM SERPL-MCNC: 9.1 MG/DL (ref 8.5–10.1)
CHLORIDE BLD-SCNC: 108 MMOL/L (ref 94–109)
CHOLEST SERPL-MCNC: 158 MG/DL
CO2 SERPL-SCNC: 29 MMOL/L (ref 20–32)
CREAT SERPL-MCNC: 1.19 MG/DL (ref 0.66–1.25)
FASTING STATUS PATIENT QL REPORTED: YES
GFR SERPL CREATININE-BSD FRML MDRD: 67 ML/MIN/1.73M2
GLUCOSE BLD-MCNC: 87 MG/DL (ref 70–99)
HDLC SERPL-MCNC: 41 MG/DL
LDLC SERPL CALC-MCNC: 106 MG/DL
NONHDLC SERPL-MCNC: 117 MG/DL
POTASSIUM BLD-SCNC: 4.1 MMOL/L (ref 3.4–5.3)
SODIUM SERPL-SCNC: 140 MMOL/L (ref 133–144)
TRIGL SERPL-MCNC: 57 MG/DL

## 2021-08-05 PROCEDURE — 80048 BASIC METABOLIC PNL TOTAL CA: CPT | Performed by: INTERNAL MEDICINE

## 2021-08-05 PROCEDURE — 84460 ALANINE AMINO (ALT) (SGPT): CPT | Performed by: INTERNAL MEDICINE

## 2021-08-05 PROCEDURE — 99214 OFFICE O/P EST MOD 30 MIN: CPT | Performed by: INTERNAL MEDICINE

## 2021-08-05 PROCEDURE — 36415 COLL VENOUS BLD VENIPUNCTURE: CPT | Performed by: INTERNAL MEDICINE

## 2021-08-05 PROCEDURE — 80061 LIPID PANEL: CPT | Performed by: INTERNAL MEDICINE

## 2021-08-05 RX ORDER — CARVEDILOL 25 MG/1
25 TABLET ORAL 2 TIMES DAILY WITH MEALS
Qty: 180 TABLET | Refills: 3 | Status: SHIPPED | OUTPATIENT
Start: 2021-08-05 | End: 2022-01-28

## 2021-08-05 ASSESSMENT — MIFFLIN-ST. JEOR: SCORE: 1786.08

## 2021-08-05 NOTE — PROGRESS NOTES
HPI and Plan:   See dictation    Orders Placed This Encounter   Procedures     Basic metabolic panel     Lipid Profile     ALT     Follow-Up with Cardiologist     Echocardiogram Complete       Orders Placed This Encounter   Medications     carvedilol (COREG) 25 MG tablet     Sig: Take 1 tablet (25 mg) by mouth 2 times daily (with meals)     Dispense:  180 tablet     Refill:  3       Medications Discontinued During This Encounter   Medication Reason     lisinopril (ZESTRIL) 2.5 MG tablet Stopped by Patient     carvedilol (COREG) 25 MG tablet Reorder         Encounter Diagnoses   Name Primary?     Nonischemic cardiomyopathy (H) Yes     Mild ascending aorta dilation (H)      LBBB (left bundle branch block)      Essential hypertension, benign      Hyperlipidemia with target LDL less than 100      Chronic systolic congestive heart failure (H)        CURRENT MEDICATIONS:  Current Outpatient Medications   Medication Sig Dispense Refill     carvedilol (COREG) 25 MG tablet Take 1 tablet (25 mg) by mouth 2 times daily (with meals) 180 tablet 3       ALLERGIES   No Known Allergies    PAST MEDICAL HISTORY:  Past Medical History:   Diagnosis Date     Acute renal insufficiency      Anxiety      CHF (congestive heart failure), NYHA class I (H) 02/20/2015    2/21/15 Echo shows EF 30-35%, 4/14/15 Echo shows EF improved to 43-46%     Essential hypertension, benign 7/29/2015     History of cocaine use     prior     Hyperlipidemia LDL goal < 100      LBBB (left bundle branch block)      Mild ascending aorta dilation (H) 3/9/2016    3.9cm per echo 3/9/16      Nonischemic cardiomyopathy (H)     idiopathic nonischemic cardiopmyopathy; 3/9/16 Echo:  EF 45-50%; 2/21/15: EF 30-35%, 4/14/15: EF improved to 43-46%.          PAST SURGICAL HISTORY:  Past Surgical History:   Procedure Laterality Date     CHOLECYSTECTOMY         FAMILY HISTORY:  Family History   Problem Relation Age of Onset     Breast Cancer Mother      Cancer Father      Heart  Disease Brother         pacemaker     Pacemaker Brother      Family History Negative Brother      Family History Negative Brother      Coronary Artery Disease No family hx of      Diabetes No family hx of        SOCIAL HISTORY:  Social History     Socioeconomic History     Marital status: Single     Spouse name: None     Number of children: None     Years of education: None     Highest education level: None   Occupational History     None   Tobacco Use     Smoking status: Never Smoker     Smokeless tobacco: Never Used   Substance and Sexual Activity     Alcohol use: Yes     Comment: occ. drink     Drug use: No     Sexual activity: None   Other Topics Concern     Parent/sibling w/ CABG, MI or angioplasty before 65F 55M? No      Service Not Asked     Blood Transfusions Not Asked     Caffeine Concern No     Comment: none     Occupational Exposure Not Asked     Hobby Hazards Not Asked     Sleep Concern No     Stress Concern Not Asked     Weight Concern Yes     Comment: weight loss     Special Diet Yes     Comment: watched his sodium intake     Back Care Not Asked     Exercise Yes     Comment: daily      Bike Helmet Not Asked     Seat Belt Not Asked     Self-Exams Not Asked   Social History Narrative     None     Social Determinants of Health     Financial Resource Strain:      Difficulty of Paying Living Expenses:    Food Insecurity:      Worried About Running Out of Food in the Last Year:      Ran Out of Food in the Last Year:    Transportation Needs:      Lack of Transportation (Medical):      Lack of Transportation (Non-Medical):    Physical Activity:      Days of Exercise per Week:      Minutes of Exercise per Session:    Stress:      Feeling of Stress :    Social Connections:      Frequency of Communication with Friends and Family:      Frequency of Social Gatherings with Friends and Family:      Attends Catholic Services:      Active Member of Clubs or Organizations:      Attends Club or Organization  "Meetings:      Marital Status:    Intimate Partner Violence:      Fear of Current or Ex-Partner:      Emotionally Abused:      Physically Abused:      Sexually Abused:        Review of Systems:  Skin:  Negative       Eyes:  Positive for glasses    ENT:  Negative      Respiratory:  Negative       Cardiovascular:  Negative;palpitations;chest pain;syncope or near-syncope;lightheadedness;exercise intolerance;fatigue;dizziness;edema      Gastroenterology: Negative      Genitourinary:  Negative      Musculoskeletal:  Negative      Neurologic:  Negative      Psychiatric:  Negative      Heme/Lymph/Imm:  Negative      Endocrine:  Negative        Physical Exam:  Vitals: /88   Pulse 68   Ht 1.803 m (5' 11\")   Wt 93.9 kg (207 lb)   BMI 28.87 kg/m      Constitutional:  cooperative, alert and oriented, well developed, well nourished, in no acute distress        Skin:  warm and dry to the touch, no apparent skin lesions or masses noted          Head:  normocephalic, no masses or lesions        Eyes:  pupils equal and round        Lymph:      ENT:  no pallor or cyanosis, dentition good        Neck:  carotid pulses are full and equal bilaterally, JVP normal, no carotid bruit        Respiratory:  clear to auscultation         Cardiac: regular rhythm, normal S1/S2, no S3 or S4, apical impulse not displaced, no murmurs, gallops or rubs                pulses full and equal                                        GI:  BS normoactive;abdomen soft        Extremities and Muscular Skeletal:  no edema;no deformities, clubbing, cyanosis, erythema observed              Neurological:  no gross motor deficits;affect appropriate        Psych:  Alert and Oriented x 3        CC  No referring provider defined for this encounter.              "

## 2021-08-05 NOTE — LETTER
8/5/2021    Lyndon Steve MD  [address] [facility] [address]    RE: Orlin Griggs       Dear Colleague,    I had the pleasure of seeing Orlin Griggs in the [facility].    HPI and Plan:   See dictation    Orders Placed This Encounter   Procedures     Basic metabolic panel     Lipid Profile     ALT     Follow-Up with Cardiologist     Echocardiogram Complete       Orders Placed This Encounter   Medications     carvedilol (COREG) 25 MG tablet     Sig: Take 1 tablet (25 mg) by mouth 2 times daily (with meals)     Dispense:  180 tablet     Refill:  3       Medications Discontinued During This Encounter   Medication Reason     lisinopril (ZESTRIL) 2.5 MG tablet Stopped by Patient     carvedilol (COREG) 25 MG tablet Reorder         Encounter Diagnoses   Name Primary?     Nonischemic cardiomyopathy (H) Yes     Mild ascending aorta dilation (H)      LBBB (left bundle branch block)      Essential hypertension, benign      Hyperlipidemia with target LDL less than 100      Chronic systolic congestive heart failure (H)        CURRENT MEDICATIONS:  Current Outpatient Medications   Medication Sig Dispense Refill     carvedilol (COREG) 25 MG tablet Take 1 tablet (25 mg) by mouth 2 times daily (with meals) 180 tablet 3       ALLERGIES   No Known Allergies    PAST MEDICAL HISTORY:  Past Medical History:   Diagnosis Date     Acute renal insufficiency      Anxiety      CHF (congestive heart failure), NYHA class I (H) 02/20/2015    2/21/15 Echo shows EF 30-35%, 4/14/15 Echo shows EF improved to 43-46%     Essential hypertension, benign 7/29/2015     History of cocaine use     prior     Hyperlipidemia LDL goal < 100      LBBB (left bundle branch block)      Mild ascending aorta dilation (H) 3/9/2016    3.9cm per echo 3/9/16      Nonischemic cardiomyopathy (H)     idiopathic nonischemic cardiopmyopathy; 3/9/16 Echo:  EF 45-50%; 2/21/15: EF 30-35%, 4/14/15: EF improved  to 43-46%.          PAST SURGICAL HISTORY:  Past Surgical History:   Procedure Laterality Date     CHOLECYSTECTOMY         FAMILY HISTORY:  Family History   Problem Relation Age of Onset     Breast Cancer Mother      Cancer Father      Heart Disease Brother         pacemaker     Pacemaker Brother      Family History Negative Brother      Family History Negative Brother      Coronary Artery Disease No family hx of      Diabetes No family hx of        SOCIAL HISTORY:  Social History     Socioeconomic History     Marital status: Single     Spouse name: None     Number of children: None     Years of education: None     Highest education level: None   Occupational History     None   Tobacco Use     Smoking status: Never Smoker     Smokeless tobacco: Never Used   Substance and Sexual Activity     Alcohol use: Yes     Comment: occ. drink     Drug use: No     Sexual activity: None   Other Topics Concern     Parent/sibling w/ CABG, MI or angioplasty before 65F 55M? No      Service Not Asked     Blood Transfusions Not Asked     Caffeine Concern No     Comment: none     Occupational Exposure Not Asked     Hobby Hazards Not Asked     Sleep Concern No     Stress Concern Not Asked     Weight Concern Yes     Comment: weight loss     Special Diet Yes     Comment: watched his sodium intake     Back Care Not Asked     Exercise Yes     Comment: daily      Bike Helmet Not Asked     Seat Belt Not Asked     Self-Exams Not Asked   Social History Narrative     None     Social Determinants of Health     Financial Resource Strain:      Difficulty of Paying Living Expenses:    Food Insecurity:      Worried About Running Out of Food in the Last Year:      Ran Out of Food in the Last Year:    Transportation Needs:      Lack of Transportation (Medical):      Lack of Transportation (Non-Medical):    Physical Activity:      Days of Exercise per Week:      Minutes of Exercise per Session:    Stress:      Feeling of Stress :    Social  "Connections:      Frequency of Communication with Friends and Family:      Frequency of Social Gatherings with Friends and Family:      Attends Lutheran Services:      Active Member of Clubs or Organizations:      Attends Club or Organization Meetings:      Marital Status:    Intimate Partner Violence:      Fear of Current or Ex-Partner:      Emotionally Abused:      Physically Abused:      Sexually Abused:        Review of Systems:  Skin:  Negative       Eyes:  Positive for glasses    ENT:  Negative      Respiratory:  Negative       Cardiovascular:  Negative;palpitations;chest pain;syncope or near-syncope;lightheadedness;exercise intolerance;fatigue;dizziness;edema      Gastroenterology: Negative      Genitourinary:  Negative      Musculoskeletal:  Negative      Neurologic:  Negative      Psychiatric:  Negative      Heme/Lymph/Imm:  Negative      Endocrine:  Negative        Physical Exam:  Vitals: /88   Pulse 68   Ht 1.803 m (5' 11\")   Wt 93.9 kg (207 lb)   BMI 28.87 kg/m      Constitutional:  cooperative, alert and oriented, well developed, well nourished, in no acute distress        Skin:  warm and dry to the touch, no apparent skin lesions or masses noted          Head:  normocephalic, no masses or lesions        Eyes:  pupils equal and round        Lymph:      ENT:  no pallor or cyanosis, dentition good        Neck:  carotid pulses are full and equal bilaterally, JVP normal, no carotid bruit        Respiratory:  clear to auscultation         Cardiac: regular rhythm, normal S1/S2, no S3 or S4, apical impulse not displaced, no murmurs, gallops or rubs                pulses full and equal                                        GI:  BS normoactive;abdomen soft        Extremities and Muscular Skeletal:  no edema;no deformities, clubbing, cyanosis, erythema observed              Neurological:  no gross motor deficits;affect appropriate        Psych:  Alert and Oriented x 3        CC  No referring provider " defined for this encounter.                  Thank you for allowing me to participate in the care of your patient.      Sincerely,     MEENA AGGARWAL MD     [facility]  cc:   No referring provider defined for this encounter.

## 2021-08-05 NOTE — PROGRESS NOTES
Service Date: 08/05/2021    CARDIOLOGY OFFICE PROGRESS NOTE     HISTORY OF PRESENT ILLNESS:  I had the opportunity to see Mr. Orlin Griggs in Cardiology Clinic today at [facility] in [place] for reevaluation of nonischemic cardiomyopathy, chronic left bundle branch block, dyslipidemia, hypertension and mild dilation of the ascending aorta.  Mr. Griggs is a 56-year-old gentleman who developed a nonischemic cardiomyopathy a number of years ago, identified after evaluation for a left bundle branch block.  Fortunately, with medical therapy, his left ventricular function steadily improved and has now stabilized with low-normal to mildly reduced ejection fraction of 50%-55%.  His echocardiogram this year, again, looked stable on 07/30/2021.  His ejection fraction was again 50%-55% with a biplane ejection fraction measurement of 52%.  His tissue Doppler findings suggest normal left ventricular filling pressures and he has a septal wall motion abnormality consistent with left bundle branch block which may account for his slightly decreased ejection fraction due to dyssynchrony.  The aortic root and ascending aorta are mildly dilated and stable compared to previous echoes.  He has no valvular disease.  Left ventricular chamber size is again normal with normal wall thickness.  Left ventricular end-diastolic dimension was 5.6 cm.    He was concerned about his cholesterol numbers.  [facility] level of 237 but this was a nonfasting sample.  At that time, his HDL was 33, LDL 70 and total cholesterol 150.  His nonfasting glucose was 82 and hemoglobin A1c was 5.5.  His basic metabolic panel was normal, although his creatinine was 1.3 on that sample, slightly above normal.    We repeated the labs here today and he has made some dietary changes in the interim and was fasting for our sample.  The creatinine was 1.19, better than previous measurements.  This  may reflect hydration and/or discontinuation of lisinopril last year.  His total cholesterol was 158, HDL 41, , and triglycerides 57.  His fasting glucose was 87.  I am very pleased with these cholesterol numbers.    He is feeling very well.  He finished up at [employer] and is now working for the [employer].  He does some lifting at the post office but has also been doing a lot of exercise on his own including cardio and weightlifting.  He has not been consistently lifting heavy but has definitely been feeling stronger.  He has had no problems with chest discomfort, shortness of breath, palpitations, lightheadedness or syncope.    Currently, he is taking only carvedilol 25 mg p.o. b.i.d.    PHYSICAL EXAMINATION:    VITAL SIGNS:  Here today, his blood pressure is 126/88, heart rate 68 and weight 207 pounds, 6 pounds less than a year ago.    RESPIRATORY:  His lungs are clear.  CARDIAC:  His heart rhythm is regular.  He has no cardiac murmurs or carotid bruits.    IMPRESSIONS:  Mr. Orlin Griggs is a 56-year-old gentleman with a history of a mild nonischemic cardiomyopathy with significant improvement after initiation of medications including lisinopril and carvedilol.  Lisinopril was discontinued last year because of some concern about his kidney function with a slightly elevated creatinine.  His creatinine this year is better and his left ventricular function has remained stable.  I do not think we need to consider restarting the lisinopril.  I would encourage him to continue his current dose of carvedilol because it is helpful to maintain left ventricular function as well as relieve pressure on his ascending aorta and prevent further dilation.  His cholesterol numbers are excellent from my standpoint.  His blood pressures are excellent as well, consistently between 105/65 and 124/75 at home.    I discouraged him from lifting heavy for exercise or at work.  I recommended that he increase the number of repetitions  per set up to 15-20 at least and try not to strain or hold his breath during exercise.    I will plan to see him back again in 1 year for reevaluation of these issues and repeat blood testing and an echocardiogram again at that time.    Twenty-five minutes were spent today in reviewing his studies and discussing the results.    Jose Mata MD, FACC    cc:  Lyndon Steve MD  [facility]  [address] [place] [id number]    Jose Mata MD, FACC        D: 2021   T: 2021   MT: justine    Name:     ISHA PANIAGUA  MRN:      [id number]        Account:      [id number]   :      1965           Service Date: 2021       Document: [id number]

## 2021-11-01 ENCOUNTER — CARE COORDINATION (OUTPATIENT)
Dept: CARDIOLOGY | Facility: CLINIC | Age: 56
End: 2021-11-01
Payer: COMMERCIAL

## 2021-11-01 NOTE — PROGRESS NOTES
Received VM from Orlin with concerns of palpitations x 2 weeks. Patient states [facility] that [facility] should lose [facility]. He took that seriously and started a combination of strenuous cardio exercise and intermittent fasting. After he made those changes he noticed the palpitations  in intensity. Orlin describes the feeling of several stronger, fluttering type of heartbeats and then a pause.  Orlin wonders if he should repeat a monitor (last one in 2016).  Palpitations have now improved after increasing fluids and getting more rest. Orlin said his family was worried about these palpitations causing blood clots so he put himself on aspirin 81 mg daily. Reviewed signs/sx that would necessitate an ED visit. I told him the way he describes his palpitations sound like PVC's but I can't say for sure. Will route to Dr. Mata for recommendations. Sole Dean RN on 11/1/2021 at 12:01 PM      ----------------------------------------------------------------------------------------------------------------------------------------------------------------------------------------------------------------------------------------------------------------------------------------------------    November 2, 2021  10:06 AM  Addendum    Patient called and left  stating he has not felt any further palpitations in > 20 hours. Orlin now wondering if we should hold off on any potential heart monitor for now. Sole Dean RN on 11/2/2021 at 10:07 AM

## 2021-11-05 NOTE — TELEPHONE ENCOUNTER
I think a monitor sounds reasonable to make sure that we know what he has. If these are daily symptoms, a 24 holter monitor would be fine. If they are intermittent, a 3 day or 7 day ziopatch monitor would be better. He probably doesn't need the aspirin. It doesn't help much in afib, if that is what he has (I doubt it). EE

## 2021-11-09 NOTE — PATIENT INSTRUCTIONS
Call out to Orlin with recommendations. Orlin states he doesn't feel the palpitation symptoms as much any more now. He said he got rest, stopped exercising and rehydrated for a few days. He noticed that the symptoms went away. Now he hasn't slept well for a couple of days and started to exercise again. He thinks that he has felt the palpitations intermittently for the past couple of days. Orlin said he prefers to avoid extra testing and medical bills for now. Orlin said he has no chest pain, shortness of breath or lightheadedness. Discussed the utility of the monitor would be to determine what he has, either a benign issue like PACs/PVCs or something more concerning such as afib or another arrhythmia. Discussed that Dr. Mata doesn't think that aspirin is necessary. Since Orlin feels the symptoms have mostly subsided he would like to conservatively monitor these for now. He will call us if symptoms pick back up and then he can schedule the monitor. We will decide at that time the length of the monitor needed. Sole Dean RN on 11/9/2021 at 3:46 PM      Jose Mata MD  [facility] 7 4 days ago     EE       I think a monitor sounds reasonable to make sure that we know what he has. If these are daily symptoms, a 24 holter monitor would be fine. If they are intermittent, a 3 day or 7 day ziopatch monitor would be better. He probably doesn't need the aspirin. It doesn't help much in afib, if that is what he has (I doubt it). EE         Documentation

## 2021-11-12 NOTE — PROGRESS NOTES
Pt left message stating he is still having palpitations, especially when he is laying down. They are not as frequent as they were previously. Pt would like to monitor his symptoms for a few more days before deciding on the heart monitor. He will call back next week with an update. Enma CAMARENA November 12, 2021, 4:25 PM

## 2021-11-29 ENCOUNTER — CARE COORDINATION (OUTPATIENT)
Dept: CARDIOLOGY | Facility: CLINIC | Age: 56
End: 2021-11-29
Payer: COMMERCIAL

## 2021-11-29 NOTE — PROGRESS NOTES
Pt called to report that his BP was high yesterday and this afternoon. This morning it was 120/80, but this afternoon it went up to 154/100. Pt isn't sure if it's due to anxiety. He did eat higher sodium sodium the past two days. He still has occasional palpitations, but those have improved. Pt has also been dealing with aching abdominal pain, and some esophageal irritation. He will call his PCP to discuss those symptoms. He has not had any chest pain, shortness of breath, or edema. I recommended pt check his bp 1-2x daily for the next week and to call with an update. Enma CAMARENA November 29, 2021, 5:14 PM

## 2021-11-30 ENCOUNTER — TELEPHONE (OUTPATIENT)
Dept: INTERNAL MEDICINE | Facility: CLINIC | Age: 56
End: 2021-11-30
Payer: COMMERCIAL

## 2021-12-01 NOTE — TELEPHONE ENCOUNTER
Reason for call:  Patient reporting a symptom    Symptom or request: Ulcer- upper abdominal dull pain, discomfort after eating    Duration (how long have symptoms been present): Few weeks    Have you been treated for this before? No    Additional comments: Patient would like an appt asap with Lyndon Steve only.Refused nurse triage.    Phone Number patient can be reached at:  Cell number on file:    Telephone Information:   Mobile [phone]       Best Time:  Anytime    Can we leave a detailed message on this number:  YES    Call taken on 11/30/2021 at 6:16 PM by Dia Chavarria

## 2021-12-02 NOTE — TELEPHONE ENCOUNTER
Called patient to schedule same-day appointment for 12/6/21. Patient reports abdominal pain has gone away but will keep appointment for further evaluation.

## 2021-12-06 ENCOUNTER — OFFICE VISIT (OUTPATIENT)
Dept: INTERNAL MEDICINE | Facility: CLINIC | Age: 56
End: 2021-12-06
Payer: COMMERCIAL

## 2021-12-06 VITALS
OXYGEN SATURATION: 100 % | DIASTOLIC BLOOD PRESSURE: 86 MMHG | WEIGHT: 205.8 LBS | RESPIRATION RATE: 13 BRPM | HEART RATE: 61 BPM | SYSTOLIC BLOOD PRESSURE: 138 MMHG | TEMPERATURE: 97.7 F | BODY MASS INDEX: 28.7 KG/M2

## 2021-12-06 DIAGNOSIS — R10.13 EPIGASTRIC PAIN: Primary | ICD-10-CM

## 2021-12-06 DIAGNOSIS — K21.00 GASTROESOPHAGEAL REFLUX DISEASE WITH ESOPHAGITIS, UNSPECIFIED WHETHER HEMORRHAGE: ICD-10-CM

## 2021-12-06 DIAGNOSIS — R53.83 FATIGUE, UNSPECIFIED TYPE: ICD-10-CM

## 2021-12-06 DIAGNOSIS — Z23 HIGH PRIORITY FOR 2019-NCOV VACCINE: ICD-10-CM

## 2021-12-06 DIAGNOSIS — R00.2 PALPITATIONS: ICD-10-CM

## 2021-12-06 LAB
ERYTHROCYTE [DISTWIDTH] IN BLOOD BY AUTOMATED COUNT: 13.9 % (ref 10–15)
HCT VFR BLD AUTO: 46.9 % (ref 40–53)
HGB BLD-MCNC: 15.4 G/DL (ref 13.3–17.7)
LIPASE SERPL-CCNC: 165 U/L (ref 73–393)
MCH RBC QN AUTO: 29.6 PG (ref 26.5–33)
MCHC RBC AUTO-ENTMCNC: 32.8 G/DL (ref 31.5–36.5)
MCV RBC AUTO: 90 FL (ref 78–100)
PLATELET # BLD AUTO: 177 10E3/UL (ref 150–450)
RBC # BLD AUTO: 5.2 10E6/UL (ref 4.4–5.9)
WBC # BLD AUTO: 6.1 10E3/UL (ref 4–11)

## 2021-12-06 PROCEDURE — 0004A COVID-19,PF,PFIZER (12+ YRS): CPT | Performed by: INTERNAL MEDICINE

## 2021-12-06 PROCEDURE — 83690 ASSAY OF LIPASE: CPT | Performed by: INTERNAL MEDICINE

## 2021-12-06 PROCEDURE — 91300 COVID-19,PF,PFIZER (12+ YRS): CPT | Performed by: INTERNAL MEDICINE

## 2021-12-06 PROCEDURE — 36415 COLL VENOUS BLD VENIPUNCTURE: CPT | Performed by: INTERNAL MEDICINE

## 2021-12-06 PROCEDURE — 80053 COMPREHEN METABOLIC PANEL: CPT | Performed by: INTERNAL MEDICINE

## 2021-12-06 PROCEDURE — 85027 COMPLETE CBC AUTOMATED: CPT | Performed by: INTERNAL MEDICINE

## 2021-12-06 PROCEDURE — 99214 OFFICE O/P EST MOD 30 MIN: CPT | Mod: 25 | Performed by: INTERNAL MEDICINE

## 2021-12-06 PROCEDURE — 84443 ASSAY THYROID STIM HORMONE: CPT | Performed by: INTERNAL MEDICINE

## 2021-12-06 RX ORDER — PANTOPRAZOLE SODIUM 40 MG/1
40 TABLET, DELAYED RELEASE ORAL DAILY
Qty: 90 TABLET | Refills: 1 | Status: SHIPPED | OUTPATIENT
Start: 2021-12-06 | End: 2023-10-24

## 2021-12-06 NOTE — PATIENT INSTRUCTIONS
*  Epigastric pain, most likely due to acid reflux and/or gastritis ( irritation of the stomach lining)    *  Most likely a function of a number of things incluoding how, what and when you eat.     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     *  Start Pantoprazole 40 mg once per day    *  Checking many labs today to confirm that all of your organs are normal.     *  Return to stool sample checking for Helicobacter Pylori.    *  We are also checking for the presence of a special bacteria called H. Pylori that has been associated with ulcers and stomach dysfunction.  We check for this usually using either blood tests or stool cultures.  If there is evidence for exposure to this bacteria, then we treat it by using two different antibiotics twice per day for 2 weeks and an anti acid pill such as Prilosec or Prevacid twice daily for 2 weeks.  Then we have you return to your previous dose of anti acids.   We will contact you if these tests are indicating that you need treatment.    *  Covid booster given today.  Take Tylenol 1000 mg later this afternoon. Then 1000 mg at bedtime to help prevent side effects from the Covid booster shot.

## 2021-12-06 NOTE — LETTER
"December 8, 2021      Orlin Griggs  [address]  [facility] [address]        Dear ,    We are writing to inform you of your test results.    Your labs were all within normal limits,including electrolytes, kidney function, blood counts, and stool test.  No evidence for Helicobacter Pylori infection.   Follow the plans that we discussed.   Westley a follow up \"virtual telephone visit\" in one month to tell me how you are doing.        Resulted Orders   CBC with platelets   Result Value Ref Range    WBC Count 6.1 4.0 - 11.0 10e3/uL    RBC Count 5.20 4.40 - 5.90 10e6/uL    Hemoglobin 15.4 13.3 - 17.7 g/dL    Hematocrit 46.9 40.0 - 53.0 %    MCV 90 78 - 100 fL    MCH 29.6 26.5 - 33.0 pg    MCHC 32.8 31.5 - 36.5 g/dL    RDW 13.9 10.0 - 15.0 %    Platelet Count 177 150 - 450 10e3/uL   Comprehensive metabolic panel   Result Value Ref Range    Sodium 139 133 - 144 mmol/L    Potassium 4.6 3.4 - 5.3 mmol/L    Chloride 107 94 - 109 mmol/L    Carbon Dioxide (CO2) 28 20 - 32 mmol/L    Anion Gap 4 3 - 14 mmol/L    Urea Nitrogen 19 7 - 30 mg/dL    Creatinine 1.11 0.66 - 1.25 mg/dL    Calcium 9.1 8.5 - 10.1 mg/dL    Glucose 83 70 - 99 mg/dL    Alkaline Phosphatase 48 40 - 150 U/L    AST 19 0 - 45 U/L    ALT 31 0 - 70 U/L    Protein Total 8.0 6.8 - 8.8 g/dL    Albumin 4.0 3.4 - 5.0 g/dL    Bilirubin Total 1.6  0.2 - 1.3 mg/dL    GFR Estimate 73 >60 mL/min/1.73m2      Comment:      As of July 11, 2021, eGFR is calculated by the CKD-EPI creatinine equation, without race adjustment. eGFR can be influenced by muscle mass, exercise, and diet. The reported eGFR is an estimation only and is only applicable if the renal function is stable.   Lipase   Result Value Ref Range    Lipase 165 73 - 393 U/L   TSH with free T4 reflex   Result Value Ref Range    TSH 3.03 0.40 - 4.00 mU/L   Helicobacter pylori Antigen Stool   Result Value Ref Range    Helicobacter pylori Antigen Stool Negative Negative      Comment:      Negative for " Helicobacter pylori antigen by enzyme immunoassay. A negative result indicates the absence of H. pylori antigen or that the level of antigen is below the level of detection.       If you have any questions or concerns, please call the clinic at the number listed above.       Sincerely,      Lyndon Steve MD

## 2021-12-06 NOTE — PROGRESS NOTES
Assessment & Plan     (R10.13) Epigastric pain  (primary encounter diagnosis)  Comment:   *  Epigastric pain, most likely due to acid reflux and/or gastritis ( irritation of the stomach lining)    *  Most likely a function of a number of things including recent changes for the worse in how, what, and when you eat.     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     *  Start Pantoprazole 40 mg once per day    *  Checking many labs today to confirm that all of your organs are normal.     *  Return to stool sample checking for Helicobacter Pylori.    *  We are also checking for the presence of a special bacteria called H. Pylori that has been associated with ulcers and stomach dysfunction.  We check for this usually using either blood tests or stool cultures.  If there is evidence for exposure to this bacteria, then we treat it by using two different antibiotics twice per day for 2 weeks and an anti acid pill such as Prilosec or Prevacid twice daily for 2 weeks.  Then we have you return to your previous dose of anti acids.   We will contact you if these tests are indicating that you need treatment.      Plan: CBC with platelets, Comprehensive metabolic         panel, Lipase, pantoprazole (PROTONIX) 40 MG EC        tablet, Helicobacter pylori Antigen Stool            (R00.2) Palpitations  Comment: check labs.   suspdect this is mostly situational.   Stop the aspirin, it it not needed.   Plan: CBC with platelets, Comprehensive metabolic         panel, TSH with free T4 reflex            (K21.00) Gastroesophageal reflux disease with esophagitis, unspecified whether hemorrhage  Comment:   Plan: CBC with platelets, Comprehensive metabolic         panel, Lipase, pantoprazole (PROTONIX) 40 MG EC        tablet, Helicobacter pylori Antigen Stool            (R53.83) Fatigue, unspecified type  Comment: No obvious medical cause for the fatigue.    Will check for routine medical  "cause with the labs as ordered, suspect they will return abnormal.  Will follow up any abnormal labs as indicated.  D  Nonspecific fatigue may have other causes including stress and problems at home or work, or may be associated with underlying mood disorders in general.    Other causes may include sleep apnea, chronic fatigue syndrome, post viral syndromes, fibromyalgia, connective tissue disease etc.   Plan: TSH with free T4 reflex            (Z23) High priority for 2019-nCoV vaccine  Comment: *  Covid booster given today.  Take Tylenol 1000 mg later this afternoon. Then 1000 mg at bedtime to help prevent side effects from the Covid booster shot.       Plan: COVID-19,PF,PFIZER (12+ Yrs PURPLE LABEL)                      BMI:   Estimated body mass index is 28.7 kg/m  as calculated from the following:    Height as of 8/5/21: 1.803 m (5' 11\").    Weight as of this encounter: 93.4 kg (205 lb 12.8 oz).           Return in about 4 weeks (around 1/3/2022) for for recheck, if the symptoms fail to improve.    Lyndon Steve MD  [facility] HospitalSADE Ordaz is a 56 year old who presents for the following health issues     HPI     Abdominal Pain      Duration: 3 weeks     Description (location/character/radiation): epigastric pain/ dull/ sometimes feeling radiation into throat        Associated flank pain: None    Intensity:  moderate    Accompanying signs and symptoms:        Fever/Chills: no        Gas/Bloating: no        Nausea/vomitting: no        Diarrhea: no        Dysuria or Hematuria: no     History (previous similar pain/trauma/previous testing): was previously having heartburn while doing intermittent fasting, improved once patient stopped fasting      Precipitating or alleviating factors: Intermittent fasting. Was instructed to take 81 ASA and potassium chloride daily because of palpitations, has now discontinued       Pain worse with eating/BM/urination: Worse with " eating        Pain relieved by BM: no     Therapies tried and outcome: None    LMP:  not applicable     His symptoms mostly started after making change sin his diet.   No melena, BMs formed, once per day  No specific palliative measures, but has not tried OTC H2 blockers or PPI.         2.  History of mild cardiomyopathy.   Followed closely by Cardiology Clinic.   Doing well, reviewde most recent notes and echocardiograms showing normal cardiac function.     Denies problems with vigorous physical activity, no dyspnea on exertion, no orthopnea.     3.  PSA increaseed (but still within normal range) from 2015 to 2020  Referred to Urology.     Reviewed labs with mike.     Lab Results   Component Value Date    PSA 3.53 09/03/2020    PSA 1.71 10/26/2015       **I reviewed the information recorded in the patient's EPIC chart (including but not limited to medical history, surgical history, family history, problem list, medication list, and allergy list) and updated the information as indicated based on the patients reported information.           Review of Systems   Constitutional, HEENT, cardiovascular, pulmonary, gi and gu systems are negative, except as otherwise noted.      Objective    /86   Pulse 61   Temp 97.7  F (36.5  C) (Temporal)   Resp 13   Wt 93.4 kg (205 lb 12.8 oz)   SpO2 100%   BMI 28.70 kg/m    Body mass index is 28.7 kg/m .  Physical Exam   GENERAL alert and no distress  EYES:  Normal sclera,conjunctiva, EOMI  HENT: oral and posterior pharynx without lesions or erythema, facies symmetric  NECK: Neck supple. No LAD, without thyroidmegaly.  RESP: Clear to ausculation bilaterally without wheezes or crackles. Normal BS in all fields.  CV: RRR normal S1S2 without murmurs, rubs or gallops.  LYMPH: no cervical lymph adenopathy appreciated  MS: extremities- no gross deformities of the visible extremities noted,   EXT:  no lower extremity edema  PSYCH: Alert and oriented times 3; speech-  coherent  SKIN:  No obvious significant skin lesions on visible portions of face  ABD:  Soft, nontedner, nondistneder, no ogranomegaly.

## 2021-12-06 NOTE — LETTER
December 6, 2021      Orlin Griggs  [address]  [facility] [address]        To whom it may concern:    Orlin Griggs was seen today for ongoing stomach pains, fatigue, and palpitations.  I suspect that his third shift schedule is directly playing a role in producing and exacerbating all of these his symptoms.  Due to the ongoing issues since starting third shift, I strongly recommend that he move off the third shift schedule and onto either a first or second shift schedule.      Sincerely,       Lyndon Steve M.D.  Dept. of Internal Medicine  [facility]

## 2021-12-07 ENCOUNTER — APPOINTMENT (OUTPATIENT)
Dept: LAB | Facility: CLINIC | Age: 56
End: 2021-12-07
Payer: COMMERCIAL

## 2021-12-07 PROCEDURE — 87338 HPYLORI STOOL AG IA: CPT | Performed by: INTERNAL MEDICINE

## 2021-12-08 LAB
ALBUMIN SERPL-MCNC: 4 G/DL (ref 3.4–5)
ALP SERPL-CCNC: 48 U/L (ref 40–150)
ALT SERPL W P-5'-P-CCNC: 31 U/L (ref 0–70)
ANION GAP SERPL CALCULATED.3IONS-SCNC: 4 MMOL/L (ref 3–14)
AST SERPL W P-5'-P-CCNC: 19 U/L (ref 0–45)
BILIRUB SERPL-MCNC: 1.6 MG/DL (ref 0.2–1.3)
BUN SERPL-MCNC: 19 MG/DL (ref 7–30)
CALCIUM SERPL-MCNC: 9.1 MG/DL (ref 8.5–10.1)
CHLORIDE BLD-SCNC: 107 MMOL/L (ref 94–109)
CO2 SERPL-SCNC: 28 MMOL/L (ref 20–32)
CREAT SERPL-MCNC: 1.11 MG/DL (ref 0.66–1.25)
GFR SERPL CREATININE-BSD FRML MDRD: 73 ML/MIN/1.73M2
GLUCOSE BLD-MCNC: 83 MG/DL (ref 70–99)
H PYLORI AG STL QL IA: NEGATIVE
POTASSIUM BLD-SCNC: 4.6 MMOL/L (ref 3.4–5.3)
PROT SERPL-MCNC: 8 G/DL (ref 6.8–8.8)
SODIUM SERPL-SCNC: 139 MMOL/L (ref 133–144)
TSH SERPL DL<=0.005 MIU/L-ACNC: 3.03 MU/L (ref 0.4–4)

## 2021-12-08 NOTE — RESULT ENCOUNTER NOTE
"Your labs were all within normal limits,including electrolytes, kidney function, blood counts, and stool test.  No evidence for Helicobacter Pylori infection.   Follow the plans that we discussed.   Westley a follow up \"virtual telephone visit\" in one month to tell me how you are doing.  "

## 2021-12-15 ENCOUNTER — TELEPHONE (OUTPATIENT)
Dept: INTERNAL MEDICINE | Facility: CLINIC | Age: 56
End: 2021-12-15
Payer: COMMERCIAL

## 2021-12-15 NOTE — TELEPHONE ENCOUNTER
Patient called regarding his visit on 12/6 wondering what his lab results were, results relayed and scheduled for follow up. Patient had questions about an article he was about Protonix being linked to kidney failure, informed I was not aware of this but could ask provider, he will follow up at visit.      Ye Holloway RN

## 2022-01-26 ENCOUNTER — CARE COORDINATION (OUTPATIENT)
Dept: CARDIOLOGY | Facility: CLINIC | Age: 57
End: 2022-01-26
Payer: COMMERCIAL

## 2022-01-26 DIAGNOSIS — I42.8 NONISCHEMIC CARDIOMYOPATHY (H): Primary | ICD-10-CM

## 2022-01-26 DIAGNOSIS — I10 ESSENTIAL HYPERTENSION, BENIGN: ICD-10-CM

## 2022-01-26 NOTE — PROGRESS NOTES
Patient called and left  stating he has low BP concerns but palpitations have improved.     Call out to pt. Pt states he takes his carvedilol at 11pm and 11 am. At bedtime (0300) last night BP was 115/85 but this morning around [id number] his BP was 85/53. Orlin said he's had low BP 80s-90s systolic more frequently over past few weeks, typically around the time he wakes up. Sometimes low BP persists into the afternoon but he states he does not have any dizziness, lightheadness or weakness. He is now working at a [employer] getting about 5 miles of walking and a lot of lifting in during the day. He states his palpitations have improved from several months ago when he called us. Orlin states he feels well despite low BP readings but the low readings concern him. Avg HR he reports is 60-65. Orlin doesn't think he's dehydrated. He states he drinks water and occasionally Gatorade throughout the workday. He states he increased sodium intake earlier today when his BP was low and now his BP is up to 120 systolic. Orlin doesn't want to do the ZioPatch that we recommended in August because he states his palpitations are improved. Orlin said he's seen his PCP recently (Dr. Steve, in Epic) for GERD symptoms. He denies chest pain or shortness of breath. He would like me to update Dr. Mata and states he will continue to monitor his BP for now. He will call us in a week with more BP readings or sooner for any concerning symptoms. Sole Dean RN on 1/26/2022 at 3:32 PM

## 2022-01-27 NOTE — TELEPHONE ENCOUNTER
We can cancel the zio patch monitor. No problem. I am glad the palpitations are better.     I agree that the BP's are a bit too low. Maybe he should take 1/2 of his carvedilol dose at night (12.5 mg) and the full dose at 11 am (25 mg), and see how that works. EE

## 2022-01-28 RX ORDER — CARVEDILOL 25 MG/1
TABLET ORAL
Qty: 140 TABLET | Refills: 3 | Status: SHIPPED | DISCHARGE
Start: 2022-01-28 | End: 2022-06-23

## 2022-01-28 NOTE — PATIENT INSTRUCTIONS
Call out to Orlin yeung/Dr. Mata's recommendations. Orlin said he agrees. He would like to decrease his 11PM carvedilol dose to see if that helps with his AM/earlier daytime BP readings. He also agrees he doesn't want to do a ziopatch monitor for now. He states he is feeling well and not feeling any papitations at this time. He will call us next week with an update on BP's, sooner for any concerns. Sole Dean RN on 1/28/2022 at 12:03 PM    Jose Mata MD  [facility] 7 20 hours ago (3:51 PM)     EE       We can cancel the zio patch monitor. No problem. I am glad the palpitations are better.      I agree that the BP's are a bit too low. Maybe he should take 1/2 of his carvedilol dose at night (12.5 mg) and the full dose at 11 am (25 mg), and see how that works. EE         Documentation

## 2022-02-03 NOTE — PROGRESS NOTES
Earlier today Orlin left a VM reporting his BP has improved since decrease in PM carvedilol dose. Pt reports morning BP's of 107/71, 111/69, 105/72, 116/80, 117/74 (today). Orlin states he will continue to monitor and update us PRN. Sole Dean RN on 2/3/2022 at 4:24 PM

## 2022-06-20 ENCOUNTER — HOSPITAL ENCOUNTER (OUTPATIENT)
Dept: CARDIOLOGY | Facility: CLINIC | Age: 57
Discharge: HOME | End: 2022-06-20
Attending: INTERNAL MEDICINE | Admitting: INTERNAL MEDICINE
Payer: COMMERCIAL

## 2022-06-20 DIAGNOSIS — I42.8 NONISCHEMIC CARDIOMYOPATHY (H): ICD-10-CM

## 2022-06-20 LAB
BI-PLANE LVEF ECHO: NORMAL
LVEF ECHO: NORMAL

## 2022-06-20 PROCEDURE — 93306 TTE W/DOPPLER COMPLETE: CPT | Mod: 26 | Performed by: INTERNAL MEDICINE

## 2022-06-20 PROCEDURE — 93306 TTE W/DOPPLER COMPLETE: CPT

## 2022-06-21 ENCOUNTER — LAB (OUTPATIENT)
Dept: LAB | Facility: CLINIC | Age: 57
End: 2022-06-21
Payer: COMMERCIAL

## 2022-06-21 DIAGNOSIS — I10 ESSENTIAL HYPERTENSION, BENIGN: ICD-10-CM

## 2022-06-21 DIAGNOSIS — E78.5 HYPERLIPIDEMIA WITH TARGET LDL LESS THAN 100: ICD-10-CM

## 2022-06-21 LAB
ALT SERPL W P-5'-P-CCNC: 60 U/L (ref 0–70)
ANION GAP SERPL CALCULATED.3IONS-SCNC: 3 MMOL/L (ref 3–14)
BUN SERPL-MCNC: 20 MG/DL (ref 7–30)
CALCIUM SERPL-MCNC: 9.4 MG/DL (ref 8.5–10.1)
CHLORIDE BLD-SCNC: 105 MMOL/L (ref 94–109)
CHOLEST SERPL-MCNC: 164 MG/DL
CO2 SERPL-SCNC: 29 MMOL/L (ref 20–32)
CREAT SERPL-MCNC: 1.21 MG/DL (ref 0.66–1.25)
FASTING STATUS PATIENT QL REPORTED: YES
GFR SERPL CREATININE-BSD FRML MDRD: 69 ML/MIN/1.73M2
GLUCOSE BLD-MCNC: 105 MG/DL (ref 70–99)
HDLC SERPL-MCNC: 37 MG/DL
LDLC SERPL CALC-MCNC: 103 MG/DL
NONHDLC SERPL-MCNC: 127 MG/DL
POTASSIUM BLD-SCNC: 3.8 MMOL/L (ref 3.4–5.3)
SODIUM SERPL-SCNC: 137 MMOL/L (ref 133–144)
TRIGL SERPL-MCNC: 122 MG/DL

## 2022-06-21 PROCEDURE — 36415 COLL VENOUS BLD VENIPUNCTURE: CPT | Performed by: INTERNAL MEDICINE

## 2022-06-21 PROCEDURE — 84460 ALANINE AMINO (ALT) (SGPT): CPT | Performed by: INTERNAL MEDICINE

## 2022-06-21 PROCEDURE — 80048 BASIC METABOLIC PNL TOTAL CA: CPT | Performed by: INTERNAL MEDICINE

## 2022-06-21 PROCEDURE — 80061 LIPID PANEL: CPT | Performed by: INTERNAL MEDICINE

## 2022-06-22 ENCOUNTER — OFFICE VISIT (OUTPATIENT)
Dept: CARDIOLOGY | Facility: CLINIC | Age: 57
End: 2022-06-22
Payer: COMMERCIAL

## 2022-06-22 VITALS
HEART RATE: 71 BPM | DIASTOLIC BLOOD PRESSURE: 74 MMHG | WEIGHT: 204.9 LBS | SYSTOLIC BLOOD PRESSURE: 120 MMHG | BODY MASS INDEX: 28.69 KG/M2 | HEIGHT: 71 IN

## 2022-06-22 DIAGNOSIS — I10 ESSENTIAL HYPERTENSION, BENIGN: ICD-10-CM

## 2022-06-22 DIAGNOSIS — I44.7 LBBB (LEFT BUNDLE BRANCH BLOCK): ICD-10-CM

## 2022-06-22 DIAGNOSIS — I42.8 NONISCHEMIC CARDIOMYOPATHY (H): Primary | ICD-10-CM

## 2022-06-22 DIAGNOSIS — I77.810 MILD ASCENDING AORTA DILATION (H): ICD-10-CM

## 2022-06-22 DIAGNOSIS — E78.5 HYPERLIPIDEMIA WITH TARGET LDL LESS THAN 100: ICD-10-CM

## 2022-06-22 PROCEDURE — 99214 OFFICE O/P EST MOD 30 MIN: CPT | Performed by: INTERNAL MEDICINE

## 2022-06-22 NOTE — PROGRESS NOTES
HPI and Plan:   See dictation    Today's clinic visit entailed:  Review of the result(s) of each unique test - bmp, flp, echo  Ordering of each unique test  Prescription drug management  35 minutes spent on the date of the encounter doing chart review, review of test results, patient visit and documentation   Provider  Link to [facility] Help Grid     The level of medical decision making during this visit was of moderate complexity.      Orders Placed This Encounter   Procedures     Basic metabolic panel     Lipid Profile     ALT     Follow-Up with Cardiology     Echocardiogram Complete       No orders of the defined types were placed in this encounter.      There are no discontinued medications.      Encounter Diagnoses   Name Primary?     Nonischemic cardiomyopathy (H) Yes     Essential hypertension, benign      Mild ascending aorta dilation (H)      LBBB (left bundle branch block)      Hyperlipidemia with target LDL less than 100        CURRENT MEDICATIONS:  Current Outpatient Medications   Medication Sig Dispense Refill     carvedilol (COREG) 25 MG tablet Take 0.5 tablets (12.5 mg) by mouth every evening AND 1 tablet (25 mg) every morning. 140 tablet 3     pantoprazole (PROTONIX) 40 MG EC tablet Take 1 tablet (40 mg) by mouth daily (Patient not taking: Reported on 6/22/2022) 90 tablet 1       ALLERGIES   No Known Allergies    PAST MEDICAL HISTORY:  Past Medical History:   Diagnosis Date     Acute renal insufficiency      Anxiety      CHF (congestive heart failure), NYHA class I (H) 02/20/2015    2/21/15 Echo shows EF 30-35%, 4/14/15 Echo shows EF improved to 43-46%     Essential hypertension, benign 7/29/2015     History of cocaine use     prior     Hyperlipidemia LDL goal < 100      LBBB (left bundle branch block)      Mild ascending aorta dilation (H) 3/9/2016    3.9cm per echo 3/9/16      Nonischemic cardiomyopathy (H)     idiopathic nonischemic cardiopmyopathy; 3/9/16 Echo:  EF 45-50%; 2/21/15: EF 30-35%, 4/14/15:  EF improved to 43-46%.          PAST SURGICAL HISTORY:  Past Surgical History:   Procedure Laterality Date     CHOLECYSTECTOMY  2009       FAMILY HISTORY:  Family History   Problem Relation Age of Onset     Breast Cancer Mother      Cancer Father      Heart Disease Brother         pacemaker     Pacemaker Brother      Family History Negative Brother      Family History Negative Brother      Coronary Artery Disease No family hx of      Diabetes No family hx of        SOCIAL HISTORY:  Social History     Socioeconomic History     Marital status: Single     Spouse name: None     Number of children: None     Years of education: None     Highest education level: None   Tobacco Use     Smoking status: Never Smoker     Smokeless tobacco: Never Used   Substance and Sexual Activity     Alcohol use: Yes     Comment: occ. drink     Drug use: No   Other Topics Concern     Parent/sibling w/ CABG, MI or angioplasty before 65F 55M? No     Caffeine Concern No     Comment: none     Sleep Concern No     Weight Concern Yes     Comment: weight loss     Special Diet Yes     Comment: watched his sodium intake     Exercise Yes     Comment: daily        Review of Systems:  Skin:  Negative   flushing in hands    Eyes:  Positive for glasses surgery , laser , pressure released in both eyes   ENT:  Negative tinnitus    Respiratory:  Negative cough (coughing at night for a couple months) cough, had recent cold, improving   Cardiovascular:  Negative;palpitations;chest pain;syncope or near-syncope;lightheadedness;exercise intolerance;fatigue;dizziness;edema Positive for energy level improved  Gastroenterology: Negative heartburn    Genitourinary:  Negative kidney stone    Musculoskeletal:  Negative back pain calf pain- on/off for last year  Neurologic:  Negative headaches feels warm sensation L leg,   Psychiatric:  Negative anxiety has improved - per pt  Heme/Lymph/Imm:  Negative weight loss    Endocrine:  Negative        Physical Exam:  Vitals: BP  "120/74   Pulse 71   Ht 1.803 m (5' 11\")   Wt 92.9 kg (204 lb 14.4 oz)   BMI 28.58 kg/m      Constitutional:           Skin:             Head:           Eyes:           Lymph:      ENT:           Neck:           Respiratory:            Cardiac:                                                           GI:           Extremities and Muscular Skeletal:                 Neurological:           Psych:           CC  No referring provider defined for this encounter.              "

## 2022-06-22 NOTE — LETTER
6/22/2022    Lyndon Steve MD  [address] [facility] [address]    RE: Orlin Griggs       Dear Colleague,     I had the pleasure of seeing Orlin Griggs in the [facility].  HPI and Plan:   See dictation    Today's clinic visit entailed:  Review of the result(s) of each unique test - bmp, flp, echo  Ordering of each unique test  Prescription drug management  35 minutes spent on the date of the encounter doing chart review, review of test results, patient visit and documentation   Provider  Link to [facility] Help Grid     The level of medical decision making during this visit was of moderate complexity.      Orders Placed This Encounter   Procedures     Basic metabolic panel     Lipid Profile     ALT     Follow-Up with Cardiology     Echocardiogram Complete       No orders of the defined types were placed in this encounter.      There are no discontinued medications.      Encounter Diagnoses   Name Primary?     Nonischemic cardiomyopathy (H) Yes     Essential hypertension, benign      Mild ascending aorta dilation (H)      LBBB (left bundle branch block)      Hyperlipidemia with target LDL less than 100        CURRENT MEDICATIONS:  Current Outpatient Medications   Medication Sig Dispense Refill     carvedilol (COREG) 25 MG tablet Take 0.5 tablets (12.5 mg) by mouth every evening AND 1 tablet (25 mg) every morning. 140 tablet 3     pantoprazole (PROTONIX) 40 MG EC tablet Take 1 tablet (40 mg) by mouth daily (Patient not taking: Reported on 6/22/2022) 90 tablet 1       ALLERGIES   No Known Allergies    PAST MEDICAL HISTORY:  Past Medical History:   Diagnosis Date     Acute renal insufficiency      Anxiety      CHF (congestive heart failure), NYHA class I (H) 02/20/2015    2/21/15 Echo shows EF 30-35%, 4/14/15 Echo shows EF improved to 43-46%     Essential hypertension, benign 7/29/2015     History of cocaine use     prior     Hyperlipidemia LDL goal < 100      LBBB (left bundle branch block)       Mild ascending aorta dilation (H) 3/9/2016    3.9cm per echo 3/9/16      Nonischemic cardiomyopathy (H)     idiopathic nonischemic cardiopmyopathy; 3/9/16 Echo:  EF 45-50%; 2/21/15: EF 30-35%, 4/14/15: EF improved to 43-46%.          PAST SURGICAL HISTORY:  Past Surgical History:   Procedure Laterality Date     CHOLECYSTECTOMY  2009       FAMILY HISTORY:  Family History   Problem Relation Age of Onset     Breast Cancer Mother      Cancer Father      Heart Disease Brother         pacemaker     Pacemaker Brother      Family History Negative Brother      Family History Negative Brother      Coronary Artery Disease No family hx of      Diabetes No family hx of        SOCIAL HISTORY:  Social History     Socioeconomic History     Marital status: Single     Spouse name: None     Number of children: None     Years of education: None     Highest education level: None   Tobacco Use     Smoking status: Never Smoker     Smokeless tobacco: Never Used   Substance and Sexual Activity     Alcohol use: Yes     Comment: occ. drink     Drug use: No   Other Topics Concern     Parent/sibling w/ CABG, MI or angioplasty before 65F 55M? No     Caffeine Concern No     Comment: none     Sleep Concern No     Weight Concern Yes     Comment: weight loss     Special Diet Yes     Comment: watched his sodium intake     Exercise Yes     Comment: daily        Review of Systems:  Skin:  Negative   flushing in hands    Eyes:  Positive for glasses surgery , laser , pressure released in both eyes   ENT:  Negative tinnitus    Respiratory:  Negative cough (coughing at night for a couple months) cough, had recent cold, improving   Cardiovascular:  Negative;palpitations;chest pain;syncope or near-syncope;lightheadedness;exercise intolerance;fatigue;dizziness;edema Positive for energy level improved  Gastroenterology: Negative heartburn    Genitourinary:  Negative kidney stone    Musculoskeletal:  Negative back pain calf pain- on/off for last  "year  Neurologic:  Negative headaches feels warm sensation L leg,   Psychiatric:  Negative anxiety has improved - per pt  Heme/Lymph/Imm:  Negative weight loss    Endocrine:  Negative        Physical Exam:  Vitals: /74   Pulse 71   Ht 1.803 m (5' 11\")   Wt 92.9 kg (204 lb 14.4 oz)   BMI 28.58 kg/m      Constitutional:           Skin:             Head:           Eyes:           Lymph:      ENT:           Neck:           Respiratory:            Cardiac:                                                           GI:           Extremities and Muscular Skeletal:                 Neurological:           Psych:           CC  No referring provider defined for this encounter.                Service Date: 06/22/2022    HISTORY OF PRESENT ILLNESS:  I had the opportunity to see Mr. Orlin Griggs in Cardiology Clinic today at [facility] in [place] for reevaluation of mild idiopathic cardiomyopathy, chronic left bundle branch block, dyslipidemia, hypertension, and mild dilation of the ascending aorta.  Mr. Griggs is a 57-year-old gentleman who was identified as having moderate nonischemic cardiomyopathy a number of years ago after evaluation of a left bundle branch block on ECG.  Fortunately, with medical therapy, his left ventricular function has improved and has now stabilized with a low-normal ejection fraction of 50% -55%.  This year, his biplane ejection fraction was measured at 55%, compared to 52% last year.  His left ventricular chamber size is normal at 5.0 cm, compared to borderline dilation of 5.6 cm last year.  His ascending aorta also appears stable with a measurement of 4.3 cm, compared to 4.4 cm last year.  His aortic valve is trileaflet.  His creatinine is stable at 1.2 with a GFR of 69 and BUN of 20.  His blood pressures outside of the doctor's office have been consistently excellent, ranging sometimes low, below 100 systolic up to about 120 systolic over 70.  His heart rate is 71 today and " weight 204 pounds.  His lungs are clear.  Heart rhythm is regular.  He has no cardiac murmurs.  He has occasional extrasystoles.  His palpitations have been much better and nearly resolved.    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  He logs up to 40,000 steps a day, which is equivalent to over 15 miles of walking.  I think this has kept him in excellent shape.    IMPRESSIONS:  Mr. Orlin Paniagua is a 57-year-old gentleman with a history of mild nonischemic cardiomyopathy, which has improved significantly with medical therapy.  Currently, he is on carvedilol 12.5 mg p.o. b.i.d.  He has reduced the dose somewhat due to low blood pressure issues at times.  His blood pressure still remains under excellent control, and his left ventricular function is the same or slightly better and left ventricular chamber size has improved as well.  His ascending aorta is very stable as well.  I think he can continue his current dose of carvedilol 12.5 mg p.o. b.i.d. for now and continue monitoring his blood pressures.  His kidney function is stable and I am not concerned about that issue at this time.  He is feeling well without any concerning cardiac symptoms.  I will plan to have him follow up with me again in 1 year for reevaluation.    Meena Aggarwal MD, FACC    cc:  Lyndon Steve MD  [address], [address]    Meena Aggarwal MD, FACC    D: 2022   T: 2022   MT: jose a    Name:     ORLIN PANIAGUA  MRN:      [id number]        Account:      [id number]   :      1965           Service Date: 2022       Document: [id number]    Thank you for allowing me to participate in the care of your patient.      Sincerely,   MEENA AGGARWAL MD   [facility]  cc: No referring provider defined for this encounter.

## 2022-06-23 DIAGNOSIS — I42.8 NONISCHEMIC CARDIOMYOPATHY (H): ICD-10-CM

## 2022-06-23 DIAGNOSIS — I10 ESSENTIAL HYPERTENSION, BENIGN: ICD-10-CM

## 2022-06-23 RX ORDER — CARVEDILOL 12.5 MG/1
12.5 TABLET ORAL 2 TIMES DAILY
Qty: 180 TABLET | Refills: 3 | Status: SHIPPED | OUTPATIENT
Start: 2022-06-23 | End: 2023-05-30

## 2022-06-23 NOTE — TELEPHONE ENCOUNTER
Pt left  with request for carvedilol 12.5 mg BID refill to [facility]. Pt requested 12.5 mg tablet so he no longer has to cut 25 mg strength tablets in 1/2. Called & informed pt rx sent with 12.5 mg strength tablet.    [facility] Refill Guideline reviewed.  Medication meets criteria for refill.

## 2022-06-23 NOTE — PROGRESS NOTES
Service Date: 06/22/2022    HISTORY OF PRESENT ILLNESS:  I had the opportunity to see Mr. Orlin Griggs in Cardiology Clinic today at [facility] in [place] for reevaluation of mild idiopathic cardiomyopathy, chronic left bundle branch block, dyslipidemia, hypertension, and mild dilation of the ascending aorta.  Mr. Griggs is a 57-year-old gentleman who was identified as having moderate nonischemic cardiomyopathy a number of years ago after evaluation of a left bundle branch block on ECG.  Fortunately, with medical therapy, his left ventricular function has improved and has now stabilized with a low-normal ejection fraction of 50% -55%.  This year, his biplane ejection fraction was measured at 55%, compared to 52% last year.  His left ventricular chamber size is normal at 5.0 cm, compared to borderline dilation of 5.6 cm last year.  His ascending aorta also appears stable with a measurement of 4.3 cm, compared to 4.4 cm last year.  His aortic valve is trileaflet.  His creatinine is stable at 1.2 with a GFR of 69 and BUN of 20.  His blood pressures outside of the doctor's office have been consistently excellent, ranging sometimes low, below 100 systolic up to about 120 systolic over 70.  His heart rate is 71 today and weight 204 pounds.  His lungs are clear.  Heart rhythm is regular.  He has no cardiac murmurs.  He has occasional extrasystoles.  His palpitations have been much better and nearly resolved.    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  He logs up to 40,000 steps a day, which is equivalent to over 15 miles of walking.  I think this has kept him in excellent shape.    IMPRESSIONS:  Mr. Orlin Griggs is a 57-year-old gentleman with a history of mild nonischemic cardiomyopathy, which has improved significantly with medical therapy.  Currently, he is on carvedilol 12.5 mg p.o. b.i.d.  He has reduced the dose somewhat due to low blood pressure issues at times.  His blood  pressure still remains under excellent control, and his left ventricular function is the same or slightly better and left ventricular chamber size has improved as well.  His ascending aorta is very stable as well.  I think he can continue his current dose of carvedilol 12.5 mg p.o. b.i.d. for now and continue monitoring his blood pressures.  His kidney function is stable and I am not concerned about that issue at this time.  He is feeling well without any concerning cardiac symptoms.  I will plan to have him follow up with me again in 1 year for reevaluation.    Jose Mata MD, FACC    cc:  Lyndon Steve MD  [facility]  [address], [address]    Jose Mata MD, FACC        D: 2022   T: 2022   MT: jose a    Name:     ISHA PANIAGUA  MRN:      [id number]        Account:      [id number]   :      1965           Service Date: 2022       Document: [id number]

## 2023-05-30 DIAGNOSIS — I42.8 NONISCHEMIC CARDIOMYOPATHY (H): ICD-10-CM

## 2023-05-30 DIAGNOSIS — I10 ESSENTIAL HYPERTENSION, BENIGN: ICD-10-CM

## 2023-05-30 RX ORDER — CARVEDILOL 12.5 MG/1
12.5 TABLET ORAL 2 TIMES DAILY
Qty: 180 TABLET | Refills: 1 | Status: SHIPPED | OUTPATIENT
Start: 2023-05-30 | End: 2023-11-29

## 2023-09-07 ENCOUNTER — TRANSFERRED RECORDS (OUTPATIENT)
Dept: HEALTH INFORMATION MANAGEMENT | Facility: CLINIC | Age: 58
End: 2023-09-07
Payer: COMMERCIAL

## 2023-09-28 ENCOUNTER — CARE COORDINATION (OUTPATIENT)
Dept: CARDIOLOGY | Facility: CLINIC | Age: 58
End: 2023-09-28
Payer: COMMERCIAL

## 2023-09-28 NOTE — PROGRESS NOTES
Pt left message requesting his chart be updated that he has been diagnosed with prostate cancer. Pt has his annual visit with  at the end of October to discuss further. Enma CAMARENA September 28, 2023, 4:23 PM

## 2023-10-10 DIAGNOSIS — E78.5 HYPERLIPIDEMIA WITH TARGET LDL LESS THAN 100: ICD-10-CM

## 2023-10-10 DIAGNOSIS — I42.8 NONISCHEMIC CARDIOMYOPATHY (H): Primary | ICD-10-CM

## 2023-10-23 ENCOUNTER — HOSPITAL ENCOUNTER (OUTPATIENT)
Dept: CARDIOLOGY | Facility: CLINIC | Age: 58
Discharge: HOME | End: 2023-10-23
Attending: INTERNAL MEDICINE | Admitting: INTERNAL MEDICINE
Payer: COMMERCIAL

## 2023-10-23 DIAGNOSIS — I42.8 NONISCHEMIC CARDIOMYOPATHY (H): ICD-10-CM

## 2023-10-23 LAB — LVEF ECHO: NORMAL

## 2023-10-23 PROCEDURE — 93306 TTE W/DOPPLER COMPLETE: CPT | Mod: 26 | Performed by: INTERNAL MEDICINE

## 2023-10-23 PROCEDURE — 93306 TTE W/DOPPLER COMPLETE: CPT

## 2023-10-24 ENCOUNTER — OFFICE VISIT (OUTPATIENT)
Dept: INTERNAL MEDICINE | Facility: CLINIC | Age: 58
End: 2023-10-24
Payer: COMMERCIAL

## 2023-10-24 ENCOUNTER — LAB (OUTPATIENT)
Dept: LAB | Facility: CLINIC | Age: 58
End: 2023-10-24
Payer: COMMERCIAL

## 2023-10-24 VITALS
WEIGHT: 195.9 LBS | HEIGHT: 70 IN | DIASTOLIC BLOOD PRESSURE: 90 MMHG | BODY MASS INDEX: 28.05 KG/M2 | OXYGEN SATURATION: 99 % | TEMPERATURE: 97.7 F | SYSTOLIC BLOOD PRESSURE: 124 MMHG | HEART RATE: 72 BPM

## 2023-10-24 DIAGNOSIS — I10 ESSENTIAL HYPERTENSION, BENIGN: ICD-10-CM

## 2023-10-24 DIAGNOSIS — Z01.818 PREOP GENERAL PHYSICAL EXAM: Primary | ICD-10-CM

## 2023-10-24 DIAGNOSIS — I77.810 MILD ASCENDING AORTA DILATION (H): ICD-10-CM

## 2023-10-24 DIAGNOSIS — I42.8 NONISCHEMIC CARDIOMYOPATHY (H): ICD-10-CM

## 2023-10-24 DIAGNOSIS — Z12.11 SCREEN FOR COLON CANCER: ICD-10-CM

## 2023-10-24 DIAGNOSIS — Z11.4 SCREENING FOR HIV (HUMAN IMMUNODEFICIENCY VIRUS): ICD-10-CM

## 2023-10-24 DIAGNOSIS — Z11.59 NEED FOR HEPATITIS C SCREENING TEST: ICD-10-CM

## 2023-10-24 DIAGNOSIS — I44.7 LBBB (LEFT BUNDLE BRANCH BLOCK): ICD-10-CM

## 2023-10-24 DIAGNOSIS — C61 PROSTATE CANCER (H): ICD-10-CM

## 2023-10-24 DIAGNOSIS — E78.5 HYPERLIPIDEMIA WITH TARGET LDL LESS THAN 100: ICD-10-CM

## 2023-10-24 DIAGNOSIS — I50.9 CONGESTIVE HEART FAILURE, NYHA CLASS 1, UNSPECIFIED CONGESTIVE HEART FAILURE TYPE (H): ICD-10-CM

## 2023-10-24 LAB
ALT SERPL W P-5'-P-CCNC: 29 U/L (ref 0–70)
ANION GAP SERPL CALCULATED.3IONS-SCNC: 9 MMOL/L (ref 7–15)
BUN SERPL-MCNC: 23.2 MG/DL (ref 8–23)
CALCIUM SERPL-MCNC: 9.6 MG/DL (ref 8.6–10)
CHLORIDE SERPL-SCNC: 101 MMOL/L (ref 98–107)
CHOLEST SERPL-MCNC: 174 MG/DL
CREAT SERPL-MCNC: 1.26 MG/DL (ref 0.67–1.17)
DEPRECATED HCO3 PLAS-SCNC: 30 MMOL/L (ref 22–29)
EGFRCR SERPLBLD CKD-EPI 2021: 66 ML/MIN/1.73M2
ERYTHROCYTE [DISTWIDTH] IN BLOOD BY AUTOMATED COUNT: 13.1 % (ref 10–15)
GLUCOSE SERPL-MCNC: 108 MG/DL (ref 70–99)
HCT VFR BLD AUTO: 47.7 % (ref 40–53)
HDLC SERPL-MCNC: 48 MG/DL
HGB BLD-MCNC: 16.2 G/DL (ref 13.3–17.7)
LDLC SERPL CALC-MCNC: 108 MG/DL
MCH RBC QN AUTO: 30.5 PG (ref 26.5–33)
MCHC RBC AUTO-ENTMCNC: 34 G/DL (ref 31.5–36.5)
MCV RBC AUTO: 90 FL (ref 78–100)
NONHDLC SERPL-MCNC: 126 MG/DL
PLATELET # BLD AUTO: 188 10E3/UL (ref 150–450)
POTASSIUM SERPL-SCNC: 4.2 MMOL/L (ref 3.4–5.3)
RBC # BLD AUTO: 5.32 10E6/UL (ref 4.4–5.9)
SODIUM SERPL-SCNC: 140 MMOL/L (ref 135–145)
TRIGL SERPL-MCNC: 91 MG/DL
WBC # BLD AUTO: 7.5 10E3/UL (ref 4–11)

## 2023-10-24 PROCEDURE — 87389 HIV-1 AG W/HIV-1&-2 AB AG IA: CPT | Performed by: INTERNAL MEDICINE

## 2023-10-24 PROCEDURE — 80061 LIPID PANEL: CPT | Performed by: INTERNAL MEDICINE

## 2023-10-24 PROCEDURE — 99214 OFFICE O/P EST MOD 30 MIN: CPT | Mod: 25 | Performed by: INTERNAL MEDICINE

## 2023-10-24 PROCEDURE — 85027 COMPLETE CBC AUTOMATED: CPT | Performed by: INTERNAL MEDICINE

## 2023-10-24 PROCEDURE — 80048 BASIC METABOLIC PNL TOTAL CA: CPT | Performed by: INTERNAL MEDICINE

## 2023-10-24 PROCEDURE — 93000 ELECTROCARDIOGRAM COMPLETE: CPT | Performed by: INTERNAL MEDICINE

## 2023-10-24 PROCEDURE — 36415 COLL VENOUS BLD VENIPUNCTURE: CPT | Performed by: INTERNAL MEDICINE

## 2023-10-24 PROCEDURE — 84460 ALANINE AMINO (ALT) (SGPT): CPT | Performed by: INTERNAL MEDICINE

## 2023-10-24 PROCEDURE — 86803 HEPATITIS C AB TEST: CPT | Performed by: INTERNAL MEDICINE

## 2023-10-24 ASSESSMENT — PAIN SCALES - GENERAL: PAINLEVEL: NO PAIN (0)

## 2023-10-24 NOTE — PROGRESS NOTES
[address] [address]  Phone: [phone]  Primary Provider: Lyndon Steve  Pre-op Performing Provider: LYNDON STEVE      PREOPERATIVE EVALUATION:  Today's date: 10/24/2023    Orlin is a 58 year old male who presents for a preoperative evaluation.      10/24/2023     1:29 PM   Additional Questions   Roomed by Marjorie CR CMA     Surgical Information:  Surgery/Procedure: Robotic Assisted Laparoscopic Prostatectomy and Bilateral Pelvic Lymph Node Dissection   Surgery Location: [facility]   Surgeon: Dr. Дмитрий Chaparro  Surgery Date: 11/6/23  Time of Surgery: 7 am  Where patient plans to recover: At home with family  Fax number for surgical facility: [phone]     Assessment & Plan     The proposed surgical procedure is considered INTERMEDIATE risk.    1. Preop general physical exam    2. Prostate cancer (H)    3. Congestive heart failure, NYHA class 1, unspecified congestive heart failure type (H)    4. Essential hypertension, benign    5. Nonischemic cardiomyopathy (H)    6. LBBB (left bundle branch block)    7. Screen for colon cancer    8. Screening for HIV (human immunodeficiency virus)    9. Need for hepatitis C screening test    10. Mild ascending aorta dilation (H24)                Risks and Recommendations:  The patient has the following additional risks and recommendations for perioperative complications:  Cardiovascular:  --history of mild cardiomyopathy.  Cardiac status is currently stable.     Antiplatelet or Anticoagulation Medication Instructions:   - Patient is on no antiplatelet or anticoagulation medications.    No ASA or NSAIDs within 7 days of surgery.  No fish oil or Vitamin E within 7 days of surgery.         Additional Medication Instructions:  Patient is to take all scheduled medications on the day of surgery    RECOMMENDATION:  APPROVAL GIVEN to proceed with proposed  procedure, without further diagnostic evaluation.            Subjective       HPI related to upcoming procedure: prostate cancer        10/24/2023     1:25 PM   Preop Questions   1. Have you ever had a heart attack or stroke? No   2. Have you ever had surgery on your heart or blood vessels, such as a stent placement, a coronary artery bypass, or surgery on an artery in your head, neck, heart, or legs? No   3. Do you have chest pain with activity? No   4. Do you have a history of  heart failure? YES - history of mild nonischemic cardiomyopathy   5. Do you currently have a cold, bronchitis or symptoms of other infection? No   6. Do you have a cough, shortness of breath, or wheezing? No   7. Do you or anyone in your family have previous history of blood clots? No   8. Do you or does anyone in your family have a serious bleeding problem such as prolonged bleeding following surgeries or cuts? No   9. Have you ever had problems with anemia or been told to take iron pills? No   10. Have you had any abnormal blood loss such as black, tarry or bloody stools? No   11. Have you ever had a blood transfusion? No   12. Are you willing to have a blood transfusion if it is medically needed before, during, or after your surgery? Yes   13. Have you or any of your relatives ever had problems with anesthesia? No   14. Do you have sleep apnea, excessive snoring or daytime drowsiness? No   15. Do you have any artifical heart valves or other implanted medical devices like a pacemaker, defibrillator, or continuous glucose monitor? No   16. Do you have artificial joints? No   17. Are you allergic to latex? No       Health Care Directive:  Patient does not have a Health Care Directive or Living Will:     Preoperative Review of :   reviewed - no record of controlled substances prescribed.        *  No recent infectious illnesses.    *  No recent cardiac or pulmonary issues or symptoms.    *  No problems performing vigorous physical  activity, no changes in exercise tolerance.    *  No personal or family history of anesthesia complications.    *  No personal or family history of bleeding or clotting disorders.       Has known history of mild nonischemic cardiomyopathy.  No new symptoms to report.  Recent Echocardiogram from 10/23/23 showed stable and similar EF and no changes from prior results  Denies new or worsened shortness of breath, dyspnea on exertion, orthopnea, and excessive lower extremity edema.   Taking medications as ordered.     Reviewed weights in chart:  Wt Readings from Last 10 Encounters:   10/24/23 88.9 kg (195 lb 14.4 oz)   06/22/22 92.9 kg (204 lb 14.4 oz)   12/06/21 93.4 kg (205 lb 12.8 oz)   08/05/21 93.9 kg (207 lb)   07/29/20 96.9 kg (213 lb 9.6 oz)   06/18/20 94.3 kg (208 lb)   11/18/19 92.1 kg (203 lb)   05/06/19 91.2 kg (201 lb)   10/23/18 88.5 kg (195 lb)   09/26/18 92.7 kg (204 lb 4.8 oz)          Review of Systems  Constitutional, neuro, ENT, endocrine, pulmonary, cardiac, gastrointestinal, genitourinary, musculoskeletal, integument and psychiatric systems are negative, except as otherwise noted.    Patient Active Problem List    Diagnosis Date Noted    Palpitations 04/22/2016     Priority: Medium    Mild ascending aorta dilation (H24) 03/09/2016     Priority: Medium     3.9cm per echo 3/9/16      Pain, joint, shoulder region, right 10/26/2015     Priority: Medium    Pain, joint, shoulder region, left 10/26/2015     Priority: Medium    Essential hypertension, benign 07/29/2015     Priority: Medium    CARDIOVASCULAR SCREENING; LDL GOAL LESS THAN 160 04/01/2015     Priority: Medium    Hyperlipidemia with target LDL less than 100      Priority: Medium     Diagnosis updated by automated process. Provider to review and confirm.      Anxiety      Priority: Medium    Nonischemic cardiomyopathy (H)      Priority: Medium     idiopathic nonischemic cardiopmyopathy; 3/9/16 Echo:  EF 45-50%; 2/21/15: EF 30-35%, 4/14/15: EF  "improved to 43-46%.          LBBB (left bundle branch block)      Priority: Medium    CHF (congestive heart failure), NYHA class I (H) 02/20/2015     Priority: Medium      Past Medical History:   Diagnosis Date    Acute renal insufficiency     Anxiety     CHF (congestive heart failure), NYHA class I (H) 02/20/2015    2/21/15 Echo shows EF 30-35%, 4/14/15 Echo shows EF improved to 43-46%    Essential hypertension, benign 7/29/2015    History of cocaine use     prior    Hyperlipidemia LDL goal < 100     LBBB (left bundle branch block)     Mild ascending aorta dilation (H24) 3/9/2016    3.9cm per echo 3/9/16     Nonischemic cardiomyopathy (H)     idiopathic nonischemic cardiopmyopathy; 3/9/16 Echo:  EF 45-50%; 2/21/15: EF 30-35%, 4/14/15: EF improved to 43-46%.        Past Surgical History:   Procedure Laterality Date    APPENDECTOMY  1991    AS KNEE SCOPE,MED/LAT MENISCUS REPAIR Right 1992    CHOLECYSTECTOMY  2009    DENTAL SURGERY  1984    4 wisdom teeeth removed without complication    TONSILLECTOMY & ADENOIDECTOMY  1987    UMBILICAL HERNIA REPAIR  2007     Current Outpatient Medications   Medication Sig Dispense Refill    carvedilol (COREG) 12.5 MG tablet Take 1 tablet (12.5 mg) by mouth 2 times daily 180 tablet 1       No Known Allergies     Social History     Tobacco Use    Smoking status: Never    Smokeless tobacco: Never   Substance Use Topics    Alcohol use: Yes     Comment: occ. drink     Family History   Problem Relation Age of Onset    Breast Cancer Mother     Cancer Father     Heart Disease Brother         pacemaker    Pacemaker Brother     Family History Negative Brother     Family History Negative Brother     Coronary Artery Disease No family hx of     Diabetes No family hx of      History   Drug Use No         Objective     BP (!) 124/90   Pulse 72   Temp 97.7  F (36.5  C) (Oral)   Ht 1.778 m (5' 10\")   Wt 88.9 kg (195 lb 14.4 oz)   SpO2 99%   BMI 28.11 kg/m      Physical Exam    GENERAL alert and " no distress  EYES:  Normal sclera,conjunctiva, EOMI  HENT: oral and posterior pharynx without lesions or erythema, facies symmetric  NECK: Neck supple. No LAD, without thyroidmegaly.  RESP: Clear to ausculation bilaterally without wheezes or crackles. Normal BS in all fields.  CV: RRR normal S1S2 without murmurs, rubs or gallops.  LYMPH: no cervical lymph adenopathy appreciated  MS: extremities- no gross deformities of the visible extremities noted,   EXT:  no lower extremity edema  PSYCH: Alert and oriented times 3; speech- coherent  SKIN:  No obvious significant skin lesions on visible portions of face     Recent Labs   Lab Test 10/24/23  0806 06/21/22  0752 12/06/21  1656   HGB  --   --  15.4   PLT  --   --  177    137 139   POTASSIUM 4.2 3.8 4.6   CR 1.26* 1.21 1.11      Results for orders placed or performed in visit on 10/24/23   CBC with platelets     Status: Normal   Result Value Ref Range    WBC Count 7.5 4.0 - 11.0 10e3/uL    RBC Count 5.32 4.40 - 5.90 10e6/uL    Hemoglobin 16.2 13.3 - 17.7 g/dL    Hematocrit 47.7 40.0 - 53.0 %    MCV 90 78 - 100 fL    MCH 30.5 26.5 - 33.0 pg    MCHC 34.0 31.5 - 36.5 g/dL    RDW 13.1 10.0 - 15.0 %    Platelet Count 188 150 - 450 10e3/uL   Results for orders placed or performed in visit on 10/24/23   Basic metabolic panel     Status: Abnormal   Result Value Ref Range    Sodium 140 135 - 145 mmol/L    Potassium 4.2 3.4 - 5.3 mmol/L    Chloride 101 98 - 107 mmol/L    Carbon Dioxide (CO2) 30 (H) 22 - 29 mmol/L    Anion Gap 9 7 - 15 mmol/L    Urea Nitrogen 23.2 (H) 8.0 - 23.0 mg/dL    Creatinine 1.26 (H) 0.67 - 1.17 mg/dL    GFR Estimate 66 >60 mL/min/1.73m2    Calcium 9.6 8.6 - 10.0 mg/dL    Glucose 108 (H) 70 - 99 mg/dL   ALT     Status: Normal   Result Value Ref Range    ALT 29 0 - 70 U/L        Diagnostics:  Labs pending at this time.  Results will be reviewed when available.     EKG (10/24/2023): appears normal, NSR, normal axis, normal intervals, no acute ST/T  changes c/w ischemia, no LVH by voltage criteria, chronic Left Bundle Branch Block, unchanged from previous tracings    ECHOCARDIOGRAM (10/23/23):    Left ventricular systolic function is low normal. The visual ejection fraction  is 50-55%.    Septal motion is consistent with conduction abnormality.  Right ventricular global function is normal.  The aortic valve is trileaflet with aortic valve sclerosis. There is trace  aortic regurgitation.  The inferior vena cava was normal in size with preserved respiratory  variability.  The ascending aorta is Mildly dilated. Max diameter of the visualized portion  4.3 cm.     This study was compared to a TTE from 6/20/2022. There has been no significant  change.        Revised Cardiac Risk Index (RCRI):  The patient has the following serious cardiovascular risks for perioperative complications:   - No serious cardiac risks = 0 points     RCRI Interpretation: 0 points: Class I (very low risk - 0.4% complication rate)         Signed Electronically by: Lyndon Steve MD  Copy of this evaluation report is provided to requesting physician.

## 2023-10-24 NOTE — PATIENT INSTRUCTIONS
Covid vaccines are now recommended annually for all adults.  The most recent updated Covid vaccine has been approved and is now widely available at any pharmacy or vaccine clinic location.  You may receive the COVID-vaccine at the same time as the annual influenza vaccine if desired.     Schedule an influenza and Covid vaccines at your convenience at the [facility] or else any pharmacy      Investigate a Shingrix shingles vaccine with your pharmacist .  They can tell you the coverage and cost and then give it to you if the price is acceptable.    Some insurances does not cover these Shingrix shingles vaccines, and with Medicare insurance it is usually cheaper to receive this shingles vaccine from a pharmacist in a pharmacy rather than in our clinic.    At this time, you only need the 2 Shingrix vaccines and then you are done.        Follow up with Cardiology clinic as planned tomorrow.      No need to return to see me after the surgery.      Ask the surgeon about any specific restrictions           PRE-OPERATIVE INSTRUCTIONS:     *  Contact your surgeon if there is any change in your health. This includes signs of new infection, such as cold or flu (such as a sore throat, runny nose, cough, rash or fever).  Elective surgeries may need to be postponed if there is significant illness present.    *  Pre-procedure Covid testing is no longer required unless specifically requested by the surgeon or surgical facility.      *  Stop aspirin of any kind for 7 days before procedure.   (even low dose daily aspirin).    *  No NSAIDs (Motrin, Advil, ibuprofen, Aleve, etc) within 5-7 days of surgery.    *  Stop any Fish Oil or multi vitamin (and specifically anything containing vitamin E) supplements for 7 days prior to surgery because these can affect platelet function.      *  Tylenol (acetaminophen) is OK to take if needed, this medication has no effect on platelet function.  Follow instructions on the bottle    *   "Prepare your body as instructed by the surgery clinic.  If instructed, Take a shower or bath the night before surgery. Use any special soaps or cleaning instructions according to instructions from your surgeon.  If you do not have soap from your surgeon, use your regular soap. Do not shave or scrub the surgery site.  Wear clean pajamas and have clean sheets on your bed     *  ON THE MORNING OF SURGERY:     --OK to take the Carvedilol on morning of surgery.       *  Resume all medications at the same doses after surgery (no \"make up\" doses needed), unless instructed otherwise by the medical staff.     *  Attend all follow up appointments with the surgeons (and/or therapists if applicable) as instructed.     *  Contact the surgeon's office for any specific questions about after-surgery cares and follow up instructions.      *  You do not need to necessarily return to see anyone in the primary care clinic after your surgery unless specifically instructed to do so.      *  If your planned surgery gets re-scheduled to a date that falls outside the 30 day window from the date of this pre-op exam, you do NOT need to return to see us for another pre-op exam.  I can still clear you with this exam, but I will have to write and addendum from the pre-op exam from today dated within the 30 days of the surgery date.   Please contact me with any changes in the date, time, and place of surgery.        IF YOU REQUIRE NARCOTIC PAIN MEDICATION AFTER YOUR SURGERY:    --Take the narcotic pain medication exactly as prescribed, and use the absolute lowest dose needed for pain control.   The goal of pain medication is not complete pain relief, aim to just make it manageable.    --Beware of drowsiness, nausea, vomiting, when taking this medication.  Do not drive, or operate dangerous equipment after taking this.    --The main side effects from narcotic pain medication can also include intestinal side effects including nausea, vomiting, " constipation, or diarrhea.    --If your pain is not able to be controlled with the pain medication supplied to you, contact the surgeons because uncontrolled pain can be a sign of possible surgical complications.    --In case of constipation from pain medications, take over the counter Miralax powder or stool softner Senokot.  If you have a history of constipation with narcotic pain medication, consider taking Miralax powder on any day that you take a pain tablet.

## 2023-10-25 ENCOUNTER — OFFICE VISIT (OUTPATIENT)
Dept: CARDIOLOGY | Facility: CLINIC | Age: 58
End: 2023-10-25
Payer: COMMERCIAL

## 2023-10-25 VITALS
WEIGHT: 196.7 LBS | SYSTOLIC BLOOD PRESSURE: 122 MMHG | BODY MASS INDEX: 28.16 KG/M2 | HEIGHT: 70 IN | HEART RATE: 75 BPM | OXYGEN SATURATION: 97 % | DIASTOLIC BLOOD PRESSURE: 88 MMHG

## 2023-10-25 DIAGNOSIS — E78.5 HYPERLIPIDEMIA WITH TARGET LDL LESS THAN 100: ICD-10-CM

## 2023-10-25 DIAGNOSIS — I10 ESSENTIAL HYPERTENSION, BENIGN: ICD-10-CM

## 2023-10-25 DIAGNOSIS — I42.8 NONISCHEMIC CARDIOMYOPATHY (H): Primary | ICD-10-CM

## 2023-10-25 DIAGNOSIS — I44.7 LBBB (LEFT BUNDLE BRANCH BLOCK): ICD-10-CM

## 2023-10-25 DIAGNOSIS — I77.810 MILD ASCENDING AORTA DILATION (H): ICD-10-CM

## 2023-10-25 LAB
HCV AB SERPL QL IA: NONREACTIVE
HIV 1+2 AB+HIV1 P24 AG SERPL QL IA: NONREACTIVE

## 2023-10-25 PROCEDURE — 99214 OFFICE O/P EST MOD 30 MIN: CPT | Performed by: INTERNAL MEDICINE

## 2023-10-25 NOTE — PROGRESS NOTES
General Cardiology Clinic Progress Note  Orlin Griggs MRN# [id number]   YOB: 1965 Age: 58 year old       Reason for visit: Nonischemic cardiomyopathy, chronic left bundle branch block, preoperative evaluation prior to prostatectomy    History of presenting illness:    I had the opportunity to see Mr. Orlin Griggs in Cardiology Clinic today at [facility] in [place] for reevaluation of mild idiopathic cardiomyopathy, chronic left bundle branch block, dyslipidemia, hypertension, and mild dilation of the ascending aorta.  Mr. Griggs is a 58-year-old gentleman who was identified as having moderate nonischemic cardiomyopathy a number of years ago after evaluation of a left bundle branch block on ECG.  His ejection fraction was as low as 30% initially.  Fortunately, with medical therapy, his left ventricular function has improved and has now stabilized with a low-normal ejection fraction of 50% -55%.  This year, his echocardiogram is unchanged.  His ejection fraction was again reported to be 50 to 55%..  His left ventricular chamber size is normal at 4.8 cm, compared to borderline dilation of 5.6 cm 2 years ago and 5.0 cm last year.  His ascending aorta also appears stable with a measurement of 4.3 cm, but was measured at 4.0 cm by MRI in the past.  His aortic valve is trileaflet.  His creatinine is stable at 1.2.  He showed me some blood pressures taken at home which are consistently excellent in the range of 120/67.  He was on lisinopril, with mildly increased creatinine levels, and we decided to stop lisinopril and watch his left ventricular function.  We have not seen any change in his ejection fraction since then.  His creatinine has improved.    I repeated his ECG this year and it looks stable with sinus rhythm and a left bundle branch block.    He continues to be very active.  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  He has no concerning symptoms of shortness of breath, lightheadedness, palpitations, or chest discomfort.    He was recently diagnosed with prostate cancer.  His PSA was elevated and he underwent biopsy of his prostate in early September.  He met with a surgeon in late September and is preparing to undergo robotic prostatectomy in the near future.     His blood pressure here today was 122/88, heart rate 75, and weight 196 pounds.  His lungs are clear, heart rhythm is regular, he has no cardiac murmurs.            Assessment and Plan:     ASSESSMENT:    Mr. Orlin Griggs is a 58-year-old gentleman with a history of chronic left bundle branch block and nonischemic cardiomyopathy, possibly due to viral cardiomyopathy.  With medical therapy, his left ventricular function has improved and now stabilized with an ejection fraction of 50 to 55%.  There is residual degree of borderline left ventricular dysfunction is probably due to cardiac dyssynchrony caused by left bundle branch block.  I do not think he has any persistent hypokinesis.  He has no concerning cardiac symptoms.  He is very active with a strenuous job and has no symptoms of chest discomfort or shortness of breath.    He is preparing to undergo robotic prostatectomy in the near future.  From a cardiac standpoint, he is at very low risk of any cardiac complications during surgery.  I recommended that he continue his carvedilol throughout the perioperative period and go forward with surgery without additional cardiac evaluation.    I will plan to follow-up with him again in 1 year for reevaluation.    Jose Mata MD           Orders this Visit:  Orders Placed This Encounter   Procedures    Follow-Up with Cardiology    Echocardiogram Complete     No orders of the defined types were placed in this encounter.    There are no discontinued medications.    Today's clinic visit entailed:    30 minutes spent by me on  "the date of the encounter doing chart review, history and exam, documentation and further activities per the note  Provider  Link to [facility] Help Grid     The level of medical decision making during this visit was of moderate complexity.           Review of Systems:     Review of Systems:  Skin:  Negative     Eyes:  Negative    ENT:  Negative    Respiratory:  Negative    Cardiovascular:  Negative;palpitations;chest pain;edema;fatigue;lightheadedness;dizziness    Gastroenterology: Negative    Genitourinary:  Positive for prostate problem  Musculoskeletal:  Negative    Neurologic:  Negative    Psychiatric:  Negative    Heme/Lymph/Imm:  Negative    Endocrine:  Negative              Physical Exam:     Vitals: /88 (BP Location: Right arm, Patient Position: Sitting)   Pulse 75   Ht 1.778 m (5' 10\")   Wt 89.2 kg (196 lb 11.2 oz)   SpO2 97%   BMI 28.22 kg/m    Constitutional: Well nourished and in no apparent distress.  Eyes: Pupils equal, round. Sclerae anicteric.   HEENT: Normocephalic, atraumatic.   Neck: Supple. JVD   Respiratory: Breathing non-labored. Lungs clear to auscultation bilaterally. No crackles, wheezes, rhonchi, or rales.  Cardiovascular:  Regular rate and rhythm, normal S1 and S2. No murmur, rub, or gallop.  Skin: Warm, dry. No rashes, cyanosis, or xanthelasma.  Extremities: No edema.  Neurologic: No gross motor deficits. Alert, awake, and oriented to person, place and time.  Psychiatric: Affect appropriate.             Medications:     Current Outpatient Medications   Medication Sig Dispense Refill    carvedilol (COREG) 12.5 MG tablet Take 1 tablet (12.5 mg) by mouth 2 times daily 180 tablet 1       Family History   Problem Relation Age of Onset    Breast Cancer Mother     Cancer Father     Heart Disease Brother         pacemaker    Pacemaker Brother     Family History Negative Brother     Family History Negative Brother     Coronary Artery Disease No family hx of     Diabetes No family hx of  "       Social History     Socioeconomic History    Marital status: Single     Spouse name: Not on file    Number of children: Not on file    Years of education: Not on file    Highest education level: Not on file   Occupational History    Not on file   Tobacco Use    Smoking status: Never    Smokeless tobacco: Never   Substance and Sexual Activity    Alcohol use: Yes     Comment: occ. drink    Drug use: No    Sexual activity: Not on file   Other Topics Concern    Parent/sibling w/ CABG, MI or angioplasty before 65F 55M? No     Service Not Asked    Blood Transfusions Not Asked    Caffeine Concern No     Comment: none    Occupational Exposure Not Asked    Hobby Hazards Not Asked    Sleep Concern No    Stress Concern Not Asked    Weight Concern Yes     Comment: weight loss    Special Diet Yes     Comment: watched his sodium intake    Back Care Not Asked    Exercise Yes     Comment: daily     Bike Helmet Not Asked    Seat Belt Not Asked    Self-Exams Not Asked   Social History Narrative    Not on file     Social Determinants of Health     Financial Resource Strain: Low Risk  (10/24/2023)    Financial Resource Strain     Within the past 12 months, have you or your family members you live with been unable to get utilities (heat, electricity) when it was really needed?: No   Food Insecurity: Low Risk  (10/24/2023)    Food Insecurity     Within the past 12 months, did you worry that your food would run out before you got money to buy more?: No     Within the past 12 months, did the food you bought just not last and you didn t have money to get more?: No   Transportation Needs: Low Risk  (10/24/2023)    Transportation Needs     Within the past 12 months, has lack of transportation kept you from medical appointments, getting your medicines, non-medical meetings or appointments, work, or from getting things that you need?: No   Physical Activity: Not on file   Stress: Not on file   Social Connections: Not on file    Interpersonal Safety: Low Risk  (10/24/2023)    Interpersonal Safety     Do you feel physically and emotionally safe where you currently live?: Yes     Within the past 12 months, have you been hit, slapped, kicked or otherwise physically hurt by someone?: No     Within the past 12 months, have you been humiliated or emotionally abused in other ways by your partner or ex-partner?: No   Housing Stability: Low Risk  (10/24/2023)    Housing Stability     Do you have housing? : Yes     Are you worried about losing your housing?: No            Past Medical History:     Past Medical History:   Diagnosis Date    Acute renal insufficiency     Anxiety     CHF (congestive heart failure), NYHA class I (H) 02/20/2015    2/21/15 Echo shows EF 30-35%, 4/14/15 Echo shows EF improved to 43-46%    Essential hypertension, benign 7/29/2015    History of cocaine use     prior    Hyperlipidemia LDL goal < 100     LBBB (left bundle branch block)     Mild ascending aorta dilation (H24) 3/9/2016    3.9cm per echo 3/9/16     Nonischemic cardiomyopathy (H)     idiopathic nonischemic cardiopmyopathy; 3/9/16 Echo:  EF 45-50%; 2/21/15: EF 30-35%, 4/14/15: EF improved to 43-46%.                 Past Surgical History:     Past Surgical History:   Procedure Laterality Date    APPENDECTOMY  1991    AS KNEE SCOPE,MED/LAT MENISCUS REPAIR Right 1992    CHOLECYSTECTOMY  2009    DENTAL SURGERY  1984    4 wisdom teeeth removed without complication    TONSILLECTOMY & ADENOIDECTOMY  1987    UMBILICAL HERNIA REPAIR  2007              Allergies:   Patient has no known allergies.       Data:   All laboratory data reviewed:    Recent Labs   Lab Test 10/24/23  0806 06/21/22  0752 12/06/21  1656 08/05/21  0805   * 103*  --  106*   HDL 48 37*  --  41   NHDL 126 127  --  117   CHOL 174 164  --  158   TRIG 91 122  --  57   TSH  --   --  3.03  --        Lab Results   Component Value Date    WBC 7.5 10/24/2023    WBC 6.8 10/23/2018    RBC 5.32 10/24/2023     "RBC 4.48 10/23/2018    HGB 16.2 10/24/2023    HGB 13.7 10/23/2018    HCT 47.7 10/24/2023    HCT 40.3 10/23/2018    MCV 90 10/24/2023    MCV 90 10/23/2018    MCH 30.5 10/24/2023    MCH 30.6 10/23/2018    MCHC 34.0 10/24/2023    MCHC 34.0 10/23/2018    RDW 13.1 10/24/2023    RDW 13.4 10/23/2018     10/24/2023     10/23/2018       Lab Results   Component Value Date     10/24/2023     09/03/2020    POTASSIUM 4.2 10/24/2023    POTASSIUM 3.8 06/21/2022    POTASSIUM 4.0 09/03/2020    CHLORIDE 101 10/24/2023    CHLORIDE 105 06/21/2022    CHLORIDE 101 09/03/2020    CO2 30 (H) 10/24/2023    CO2 29 06/21/2022    CO2 27 09/03/2020    ANIONGAP 9 10/24/2023    ANIONGAP 3 06/21/2022    ANIONGAP 5 09/03/2020     (H) 10/24/2023     (H) 06/21/2022    GLC 84 09/03/2020    BUN 23.2 (H) 10/24/2023    BUN 20 06/21/2022    BUN 19 09/03/2020    CR 1.26 (H) 10/24/2023    CR 1.25 09/03/2020    GFRESTIMATED 66 10/24/2023    GFRESTIMATED 64 09/03/2020    GFRESTBLACK 74 09/03/2020    PREET 9.6 10/24/2023    PREET 9.1 09/03/2020      Lab Results   Component Value Date    AST 19 12/06/2021    AST 34 02/19/2015    ALT 29 10/24/2023    ALT 16 05/06/2019       No results found for: \"A1C\"    No results found for: \"INR\"      MEENA AGGARWAL MD  [facility]  "

## 2023-10-25 NOTE — PATIENT INSTRUCTIONS
It was a pleasure seeing you today and thank you for allowing me to be a part of your health care team.  Should you have any questions regarding your visit or future needs please feel free to reach out to my care team for assistance.      Thank you, Dr. Jose Mata        **Nursing: [phone]       **Scheduling: [phone]

## 2023-10-25 NOTE — LETTER
10/25/2023    Lyndon Steve MD  [address] [facility] [address]    RE: Orlin Griggs       Dear Colleague,     I had the pleasure of seeing Orlin Griggs in the [facility].    General Cardiology Clinic Progress Note  Orlin Griggs MRN# [id number]   YOB: 1965 Age: 58 year old       Reason for visit: Nonischemic cardiomyopathy, chronic left bundle branch block, preoperative evaluation prior to prostatectomy    History of presenting illness:    I had the opportunity to see Mr. Orlin Griggs in Cardiology Clinic today at [facility] in [place] for reevaluation of mild idiopathic cardiomyopathy, chronic left bundle branch block, dyslipidemia, hypertension, and mild dilation of the ascending aorta.  Mr. Griggs is a 58-year-old gentleman who was identified as having moderate nonischemic cardiomyopathy a number of years ago after evaluation of a left bundle branch block on ECG.  His ejection fraction was as low as 30% initially.  Fortunately, with medical therapy, his left ventricular function has improved and has now stabilized with a low-normal ejection fraction of 50% -55%.  This year, his echocardiogram is unchanged.  His ejection fraction was again reported to be 50 to 55%..  His left ventricular chamber size is normal at 4.8 cm, compared to borderline dilation of 5.6 cm 2 years ago and 5.0 cm last year.  His ascending aorta also appears stable with a measurement of 4.3 cm, but was measured at 4.0 cm by MRI in the past.  His aortic valve is trileaflet.  His creatinine is stable at 1.2.  He showed me some blood pressures taken at home which are consistently excellent in the range of 120/67.  He was on lisinopril, with mildly increased creatinine levels, and we decided to stop lisinopril and watch his left ventricular function.  We have not seen any change in his ejection fraction since then.  His creatinine has improved.    I repeated his ECG this year and it  looks stable with sinus rhythm and a left bundle branch block.    He continues to be very active.  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  He has no concerning symptoms of shortness of breath, lightheadedness, palpitations, or chest discomfort.    He was recently diagnosed with prostate cancer.  His PSA was elevated and he underwent biopsy of his prostate in early September.  He met with a surgeon in late September and is preparing to undergo robotic prostatectomy in the near future.     His blood pressure here today was 122/88, heart rate 75, and weight 196 pounds.  His lungs are clear, heart rhythm is regular, he has no cardiac murmurs.            Assessment and Plan:     ASSESSMENT:    Mr. Orlin Griggs is a 58-year-old gentleman with a history of chronic left bundle branch block and nonischemic cardiomyopathy, possibly due to viral cardiomyopathy.  With medical therapy, his left ventricular function has improved and now stabilized with an ejection fraction of 50 to 55%.  There is residual degree of borderline left ventricular dysfunction is probably due to cardiac dyssynchrony caused by left bundle branch block.  I do not think he has any persistent hypokinesis.  He has no concerning cardiac symptoms.  He is very active with a strenuous job and has no symptoms of chest discomfort or shortness of breath.    He is preparing to undergo robotic prostatectomy in the near future.  From a cardiac standpoint, he is at very low risk of any cardiac complications during surgery.  I recommended that he continue his carvedilol throughout the perioperative period and go forward with surgery without additional cardiac evaluation.    I will plan to follow-up with him again in 1 year for reevaluation.    Jose Mata MD           Orders this Visit:  Orders Placed This Encounter   Procedures    Follow-Up with  "Cardiology    Echocardiogram Complete     No orders of the defined types were placed in this encounter.    There are no discontinued medications.    Today's clinic visit entailed:    30 minutes spent by me on the date of the encounter doing chart review, history and exam, documentation and further activities per the note  Provider  Link to [facility] Help Grid     The level of medical decision making during this visit was of moderate complexity.           Review of Systems:     Review of Systems:  Skin:  Negative     Eyes:  Negative    ENT:  Negative    Respiratory:  Negative    Cardiovascular:  Negative;palpitations;chest pain;edema;fatigue;lightheadedness;dizziness    Gastroenterology: Negative    Genitourinary:  Positive for prostate problem  Musculoskeletal:  Negative    Neurologic:  Negative    Psychiatric:  Negative    Heme/Lymph/Imm:  Negative    Endocrine:  Negative              Physical Exam:     Vitals: /88 (BP Location: Right arm, Patient Position: Sitting)   Pulse 75   Ht 1.778 m (5' 10\")   Wt 89.2 kg (196 lb 11.2 oz)   SpO2 97%   BMI 28.22 kg/m    Constitutional: Well nourished and in no apparent distress.  Eyes: Pupils equal, round. Sclerae anicteric.   HEENT: Normocephalic, atraumatic.   Neck: Supple. JVD   Respiratory: Breathing non-labored. Lungs clear to auscultation bilaterally. No crackles, wheezes, rhonchi, or rales.  Cardiovascular:  Regular rate and rhythm, normal S1 and S2. No murmur, rub, or gallop.  Skin: Warm, dry. No rashes, cyanosis, or xanthelasma.  Extremities: No edema.  Neurologic: No gross motor deficits. Alert, awake, and oriented to person, place and time.  Psychiatric: Affect appropriate.             Medications:     Current Outpatient Medications   Medication Sig Dispense Refill    carvedilol (COREG) 12.5 MG tablet Take 1 tablet (12.5 mg) by mouth 2 times daily 180 tablet 1       Family History   Problem Relation Age of Onset    Breast Cancer Mother     Cancer Father     " Heart Disease Brother         pacemaker    Pacemaker Brother     Family History Negative Brother     Family History Negative Brother     Coronary Artery Disease No family hx of     Diabetes No family hx of        Social History     Socioeconomic History    Marital status: Single     Spouse name: Not on file    Number of children: Not on file    Years of education: Not on file    Highest education level: Not on file   Occupational History    Not on file   Tobacco Use    Smoking status: Never    Smokeless tobacco: Never   Substance and Sexual Activity    Alcohol use: Yes     Comment: occ. drink    Drug use: No    Sexual activity: Not on file   Other Topics Concern    Parent/sibling w/ CABG, MI or angioplasty before 65F 55M? No     Service Not Asked    Blood Transfusions Not Asked    Caffeine Concern No     Comment: none    Occupational Exposure Not Asked    Hobby Hazards Not Asked    Sleep Concern No    Stress Concern Not Asked    Weight Concern Yes     Comment: weight loss    Special Diet Yes     Comment: watched his sodium intake    Back Care Not Asked    Exercise Yes     Comment: daily     Bike Helmet Not Asked    Seat Belt Not Asked    Self-Exams Not Asked   Social History Narrative    Not on file     Social Determinants of Health     Financial Resource Strain: Low Risk  (10/24/2023)    Financial Resource Strain     Within the past 12 months, have you or your family members you live with been unable to get utilities (heat, electricity) when it was really needed?: No   Food Insecurity: Low Risk  (10/24/2023)    Food Insecurity     Within the past 12 months, did you worry that your food would run out before you got money to buy more?: No     Within the past 12 months, did the food you bought just not last and you didn t have money to get more?: No   Transportation Needs: Low Risk  (10/24/2023)    Transportation Needs     Within the past 12 months, has lack of transportation kept you from medical  appointments, getting your medicines, non-medical meetings or appointments, work, or from getting things that you need?: No   Physical Activity: Not on file   Stress: Not on file   Social Connections: Not on file   Interpersonal Safety: Low Risk  (10/24/2023)    Interpersonal Safety     Do you feel physically and emotionally safe where you currently live?: Yes     Within the past 12 months, have you been hit, slapped, kicked or otherwise physically hurt by someone?: No     Within the past 12 months, have you been humiliated or emotionally abused in other ways by your partner or ex-partner?: No   Housing Stability: Low Risk  (10/24/2023)    Housing Stability     Do you have housing? : Yes     Are you worried about losing your housing?: No            Past Medical History:     Past Medical History:   Diagnosis Date    Acute renal insufficiency     Anxiety     CHF (congestive heart failure), NYHA class I (H) 02/20/2015    2/21/15 Echo shows EF 30-35%, 4/14/15 Echo shows EF improved to 43-46%    Essential hypertension, benign 7/29/2015    History of cocaine use     prior    Hyperlipidemia LDL goal < 100     LBBB (left bundle branch block)     Mild ascending aorta dilation (H24) 3/9/2016    3.9cm per echo 3/9/16     Nonischemic cardiomyopathy (H)     idiopathic nonischemic cardiopmyopathy; 3/9/16 Echo:  EF 45-50%; 2/21/15: EF 30-35%, 4/14/15: EF improved to 43-46%.                 Past Surgical History:     Past Surgical History:   Procedure Laterality Date    APPENDECTOMY  1991    AS KNEE SCOPE,MED/LAT MENISCUS REPAIR Right 1992    CHOLECYSTECTOMY  2009    DENTAL SURGERY  1984    4 wisdom teeeth removed without complication    TONSILLECTOMY & ADENOIDECTOMY  1987    UMBILICAL HERNIA REPAIR  2007              Allergies:   Patient has no known allergies.       Data:   All laboratory data reviewed:    Recent Labs   Lab Test 10/24/23  0806 06/21/22  0752 12/06/21  1656 08/05/21  0805   * 103*  --  106*   HDL 48 37*  --  " 41   NHDL 126 127  --  117   CHOL 174 164  --  158   TRIG 91 122  --  57   TSH  --   --  3.03  --        Lab Results   Component Value Date    WBC 7.5 10/24/2023    WBC 6.8 10/23/2018    RBC 5.32 10/24/2023    RBC 4.48 10/23/2018    HGB 16.2 10/24/2023    HGB 13.7 10/23/2018    HCT 47.7 10/24/2023    HCT 40.3 10/23/2018    MCV 90 10/24/2023    MCV 90 10/23/2018    MCH 30.5 10/24/2023    MCH 30.6 10/23/2018    MCHC 34.0 10/24/2023    MCHC 34.0 10/23/2018    RDW 13.1 10/24/2023    RDW 13.4 10/23/2018     10/24/2023     10/23/2018       Lab Results   Component Value Date     10/24/2023     09/03/2020    POTASSIUM 4.2 10/24/2023    POTASSIUM 3.8 06/21/2022    POTASSIUM 4.0 09/03/2020    CHLORIDE 101 10/24/2023    CHLORIDE 105 06/21/2022    CHLORIDE 101 09/03/2020    CO2 30 (H) 10/24/2023    CO2 29 06/21/2022    CO2 27 09/03/2020    ANIONGAP 9 10/24/2023    ANIONGAP 3 06/21/2022    ANIONGAP 5 09/03/2020     (H) 10/24/2023     (H) 06/21/2022    GLC 84 09/03/2020    BUN 23.2 (H) 10/24/2023    BUN 20 06/21/2022    BUN 19 09/03/2020    CR 1.26 (H) 10/24/2023    CR 1.25 09/03/2020    GFRESTIMATED 66 10/24/2023    GFRESTIMATED 64 09/03/2020    GFRESTBLACK 74 09/03/2020    PREET 9.6 10/24/2023    PREET 9.1 09/03/2020      Lab Results   Component Value Date    AST 19 12/06/2021    AST 34 02/19/2015    ALT 29 10/24/2023    ALT 16 05/06/2019       No results found for: \"A1C\"    No results found for: \"INR\"      JOSE MATA MD  [facility]    Thank you for allowing me to participate in the care of your patient.      Sincerely,     JOSE MATA MD     [facility]  cc:   Jose Mata MD  [address] [place]  [place] [address]      "

## 2023-11-29 DIAGNOSIS — I10 ESSENTIAL HYPERTENSION, BENIGN: ICD-10-CM

## 2023-11-29 DIAGNOSIS — I42.8 NONISCHEMIC CARDIOMYOPATHY (H): ICD-10-CM

## 2023-11-29 RX ORDER — CARVEDILOL 12.5 MG/1
12.5 TABLET ORAL 2 TIMES DAILY
Qty: 180 TABLET | Refills: 4 | Status: SHIPPED | OUTPATIENT
Start: 2023-11-29

## 2024-03-13 NOTE — MR AVS SNAPSHOT
After Visit Summary   2/16/2018    Orlin Griggs    MRN: [id number]           Patient Information     Date Of Birth          1965        Visit Information        Provider Department      2/16/2018 2:15 PM Jose Mata MD [facility]        Today's Diagnoses     Nonischemic cardiomyopathy (H)    -  1    LBBB (left bundle branch block)        Hyperlipidemia with target LDL less than 100        Essential hypertension, benign        Mild ascending aorta dilation (H)        Chronic systolic congestive heart failure (H)           Follow-ups after your visit        Additional Services     Follow-Up with Cardiologist                 Your next 10 appointments already scheduled     Feb 20, 2018  2:00 PM CST   Office Visit with Lyndon Steve MD   [facility] ([facility]    [address] [place] [id number] [phone]           Bring a current list of meds and any records pertaining to this visit. For Physicals, please bring immunization records and any forms needing to be filled out. Please arrive 10 minutes early to complete paperwork.              Future tests that were ordered for you today     Open Future Orders        Priority Expected Expires Ordered    Basic metabolic panel Routine 8/15/2018 2/16/2019 2/16/2018    Lipid Profile Routine 8/15/2018 2/16/2019 2/16/2018    ALT Routine 8/15/2018 2/16/2019 2/16/2018    Follow-Up with Cardiologist Routine 8/15/2018 2/16/2019 2/16/2018    MRI Cardiac w/contrast Routine 8/15/2018 2/16/2019 2/16/2018            Who to contact     If you have questions or need follow up information about today's clinic visit or your schedule please contact [facility] directly at [phone].  Normal or non-critical lab and imaging results will be communicated to you by MyChart, letter or phone within 4 business days after  Subjective:       Patient ID: Shweta Dela Cruz is a 26 y.o. female.    Chief Complaint: Annual Wellness Visit    Shweta Dela Cruz is a 26 y.o. female who presents today for an annual wellness visit.     Patient complains of neck pain x 4 week was taking Ibuprofen with relief but cut back. Additionally notes headaches that respond to Excedrin. She suffers vomiting associated with headache but denies sensitivity to light or noise. History of slow gastric emptying.     Answers submitted by the patient for this visit:  Review of Systems Questionnaire (Submitted on 3/12/2024)  activity change: No  unexpected weight change: No  rhinorrhea: No  trouble swallowing: No  visual disturbance: No  chest tightness: No  polyuria: No  difficulty urinating: No  menstrual problem: No  joint swelling: No  arthralgias: Yes  confusion: No  dysphoric mood: No    Review of patient's allergies indicates:  No Known Allergies   Medication List with Changes/Refills   New Medications    PREDNISONE (DELTASONE) 20 MG TABLET    Take 2 tablets (40 mg total) by mouth once daily. for 5 days    TIZANIDINE (ZANAFLEX) 2 MG TABLET    Take 1 tablet (2 mg total) by mouth every 8 (eight) hours as needed (Neck Pain).   Current Medications    NORETHINDRONE-E.ESTRADIOL-IRON (LO LOESTRIN FE) 1 MG-10 MCG (24)/10 MCG (2) TAB    Take 1 tablet by mouth once daily.   Changed and/or Refilled Medications    Modified Medication Previous Medication    BUSPIRONE (BUSPAR) 10 MG TABLET busPIRone (BUSPAR) 10 MG tablet       Take 1 tablet (10 mg total) by mouth 3 (three) times daily.    Take 1 tablet (10 mg total) by mouth 3 (three) times daily.    METOPROLOL SUCCINATE (TOPROL-XL) 25 MG 24 HR TABLET metoprolol succinate (TOPROL-XL) 25 MG 24 hr tablet       Take 1 tablet (25 mg total) by mouth once daily.    Take 1 tablet (25 mg total) by mouth once daily.    SERTRALINE (ZOLOFT) 100 MG TABLET sertraline (ZOLOFT) 100 MG tablet       Take 1.5 tablets (150 mg total) by  "mouth once daily.    Take 1.5 tablets (150 mg total) by mouth once daily.       Health Maintenance    PAP: 08/16/2023  Tetanus/Tdap: 09/09/2022  Hepatitis C Screen: 10/17/2022  HIV Screen: 10/17/2022      Patient ambulates on her own without an assistive device.     On average, how many days per week do you do moderate to strenuous exercise:  (like a brisk walk or jog; this does not include your job/work)  5-6   On average, how many minutes do you exercise at this level each day? 45  We encourage you to start exercising if you do not already, continue at your current level or increase your level of activity.     Do you eat fruits and vegetable every day?  Yes    Do you still have a menstrual cycle? Yes    Do you go to the dentist regularly? Yes  When was you last exam:     Do you go to the eye doctor regularly? No  When was your last exam:    Have you often been bothered by feeling down, depressed, or hopeless?  several days    Review of Systems   HENT:  Negative for hearing loss.    Eyes:  Negative for discharge.   Respiratory:  Negative for wheezing.    Cardiovascular:  Negative for chest pain and palpitations.   Gastrointestinal:  Positive for vomiting. Negative for blood in stool, constipation and diarrhea.   Genitourinary:  Negative for dysuria and hematuria.   Musculoskeletal:  Positive for neck pain.   Neurological:  Positive for headaches. Negative for weakness.   Endo/Heme/Allergies:  Negative for polydipsia.     Objective:     BP (!) 124/100 (BP Location: Right arm)   Pulse 60   Temp 97.5 °F (36.4 °C) (Temporal)   Resp 12   Ht 5' 3" (1.6 m)   Wt 58.3 kg (128 lb 8.5 oz)   LMP 03/05/2024 (Approximate)   SpO2 98%   BMI 22.77 kg/m²     Physical Exam  Vitals reviewed.   Constitutional:       Appearance: Normal appearance.   HENT:      Head: Normocephalic and atraumatic.      Nose:      Right Sinus: No maxillary sinus tenderness or frontal sinus tenderness.      Left Sinus: No maxillary sinus tenderness " "the clinic has received the results. If you do not hear from us within 7 days, please contact the clinic through [facility] or phone. If you have a critical or abnormal lab result, we will notify you by phone as soon as possible.  Submit refill requests through [facility] or call your pharmacy and they will forward the refill request to us. Please allow 3 business days for your refill to be completed.          Additional Information About Your Visit        [facility]ine Information     [facility] lets you send messages to your doctor, view your test results, renew your prescriptions, schedule appointments and more. To sign up, go to [email or web address] . Click on \"Log in\" on the left side of the screen, which will take you to the Welcome page. Then click on \"Sign up Now\" on the right side of the page.     You will be asked to enter the access code listed below, as well as some personal information. Please follow the directions to create your username and password.     Your access code is: [email or web address]  Expires: 2018  2:47 PM     Your access code will  in 90 days. If you need help or a new code, please call your [facility] clinic or [phone].        Care EveryWhere ID     This is your Care EveryWhere ID. This could be used by other organizations to access your [facility] medical records  [id number]        Your Vitals Were     Pulse Height BMI (Body Mass Index)             64 1.778 m (5' 10\") 27.49 kg/m2          Blood Pressure from Last 3 Encounters:   18 (!) 138/97   17 128/86   17 118/84    Weight from Last 3 Encounters:   18 86.9 kg (191 lb 9.6 oz)   17 92.2 kg (203 lb 3.2 oz)   17 92.4 kg (203 lb 12.8 oz)              We Performed the Following     Follow-Up with Cardiologist          Today's Medication Changes          These changes are accurate as of 18  2:59 PM.  If you have any questions, ask your nurse or doctor.               These medicines have changed or have " "or frontal sinus tenderness.      Mouth/Throat:      Pharynx: Posterior oropharyngeal erythema present.      Tonsils: 2+ on the right. 2+ on the left.   Cardiovascular:      Rate and Rhythm: Normal rate and regular rhythm.      Heart sounds: Normal heart sounds. No murmur heard.  Pulmonary:      Effort: Pulmonary effort is normal.      Breath sounds: Normal breath sounds. No wheezing.   Skin:     General: Skin is warm and dry.   Neurological:      Mental Status: She is alert and oriented to person, place, and time.       BP Readings from Last 3 Encounters:   03/13/24 (!) 124/100   08/16/23 110/76   01/05/23 112/62     Wt Readings from Last 3 Encounters:   03/13/24 58.3 kg (128 lb 8.5 oz)   08/16/23 57.8 kg (127 lb 6.8 oz)   01/05/23 58.2 kg (128 lb 3.2 oz)       I reviewed and independently interpreted the labs and imaging below:  Last Labs:  No results found for: "GLU"  BUN   Date Value Ref Range Status   10/11/2022 6.0 (L) 7.0 - 25.0 mg/dL Final     Creatinine   Date Value Ref Range Status   10/11/2022 0.60 0.50 - 1.10 mg/dL Final     Cholesterol   Date Value Ref Range Status   10/17/2022 170 <200 mg/dL Final     Hemoglobin A1C   Date Value Ref Range Status   10/17/2022 5.2 4.7 - 5.6 % Final     No results found for: "HGB"  No results found for: "HCT"  No results found for: "[id number]"    Assessment and Plan:     1. Encounter for annual health examination    --Nutrition: Discussed the importance of moderate caffeine intake. Adhering to a low sodium, low saturated fat/low cholesterol diet.   --Exercise: Discussed the importance of regular exercise. At least 30 minutes 5 days per week.   --Immunizations reviewed.  --Discussed benefits of maintaining up to date health maintenance.    - CBC Auto Differential; Future  - Comprehensive Metabolic Panel; Future  - Lipid Panel; Future  - TSH; Future  - Vitamin B12; Future  - FOLATE; Future  - IRON AND TIBC; Future    2. Cervicalgia    - tiZANidine (ZANAFLEX) 2 MG tablet; " updated prescriptions.        Dose/Directions    carvedilol 25 MG tablet   Commonly known as:  COREG   This may have changed:    - medication strength  - how much to take  - Another medication with the same name was removed. Continue taking this medication, and follow the directions you see here.   Used for:  Chronic systolic congestive heart failure (H)   Changed by:  Jose Mata MD        Dose:  25 mg   Take 1 tablet (25 mg) by mouth 2 times daily (with meals)   Quantity:  180 tablet   Refills:  3            Where to get your medicines      These medications were sent to [facility] [id number] [facility] - [place], [place] - [address]   [address] [facility] [address]     Phone:  [phone]     carvedilol 25 MG tablet                Primary Care Provider Office Phone # Fax #    Lyndon Tarun Steve [phone]       [address]  [facility] [address]        Equal Access to Services     [facility]: Hadii landry barrera hadasho Solakeisha, waaxda luqadaha, qaybta kaalmada adealyssayada, julio ortiz . So [facility] [phone].    ATENCIÓN: Si habla español, tiene a maldonado disposición servicios gratuitos de asistencia lingüística. Yancigodwin al [phone].    We comply with applicable federal civil rights laws and [place] laws. We do not discriminate on the basis of race, color, national origin, age, disability, sex, sexual orientation, or gender identity.            Thank you!     Thank you for choosing [facility]  for your care. Our goal is always to provide you with excellent care. Hearing back from our patients is one way we can continue to improve our services. Please take a few minutes to complete the written survey that you may receive in the mail after your visit with us. Thank you!             Your Updated Medication List - Protect others around you: Learn how to safely use, store and throw away your medicines at www.disposemymeds.org.           This list is accurate as of 2/16/18  2:59 PM.  Always use your most recent med list.                   Brand Name Dispense Instructions for use Diagnosis    carvedilol 25 MG tablet    COREG    180 tablet    Take 1 tablet (25 mg) by mouth 2 times daily (with meals)    Chronic systolic congestive heart failure (H)       lisinopril 5 MG tablet    PRINIVIL/ZESTRIL    180 tablet    Take 1 tablet (5 mg) by mouth 2 times daily    CHF (congestive heart failure) (H)          Patient seen and examined on AM rounds  Doing well  No SOB / SEVERINO  Pain with good control  vitals stalbe  awake, alert, oriented x 4    hematocrit 25 -> 27 after one unit PRBCs last night  no need for VATS as per thoracic service  INR improving spontaneously   medical management as per hospitalist Take 1 tablet (2 mg total) by mouth every 8 (eight) hours as needed (Neck Pain).  Dispense: 15 tablet; Refill: 0  - predniSONE (DELTASONE) 20 MG tablet; Take 2 tablets (40 mg total) by mouth once daily. for 5 days  Dispense: 10 tablet; Refill: 0    3. Acute nonintractable headache, unspecified headache type    - tiZANidine (ZANAFLEX) 2 MG tablet; Take 1 tablet (2 mg total) by mouth every 8 (eight) hours as needed (Neck Pain).  Dispense: 15 tablet; Refill: 0  - predniSONE (DELTASONE) 20 MG tablet; Take 2 tablets (40 mg total) by mouth once daily. for 5 days  Dispense: 10 tablet; Refill: 0    4. Essential hypertension    Chronic, elevated - did not take medication this AM does not monitor at home, continue current medication    - metoprolol succinate (TOPROL-XL) 25 MG 24 hr tablet; Take 1 tablet (25 mg total) by mouth once daily.  Dispense: 90 tablet; Refill: 3    5. AUGUSTUS (generalized anxiety disorder)    Chronic, stable, continue current medication    - busPIRone (BUSPAR) 10 MG tablet; Take 1 tablet (10 mg total) by mouth 3 (three) times daily.  Dispense: 270 tablet; Refill: 3    6. Depression, unspecified depression type    Chronic, stable, continue current medication    - sertraline (ZOLOFT) 100 MG tablet; Take 1.5 tablets (150 mg total) by mouth once daily.  Dispense: 135 tablet; Refill: 3    7. Vegetarian diet    - Vitamin B12; Future  - FOLATE; Future  - IRON AND TIBC; Future

## 2024-08-06 ENCOUNTER — HOSPITAL ENCOUNTER (EMERGENCY)
Facility: CLINIC | Age: 59
Discharge: HOME | End: 2024-08-06
Attending: STUDENT IN AN ORGANIZED HEALTH CARE EDUCATION/TRAINING PROGRAM | Admitting: STUDENT IN AN ORGANIZED HEALTH CARE EDUCATION/TRAINING PROGRAM
Payer: COMMERCIAL

## 2024-08-06 VITALS
TEMPERATURE: 97.9 F | HEIGHT: 71 IN | DIASTOLIC BLOOD PRESSURE: 97 MMHG | SYSTOLIC BLOOD PRESSURE: 147 MMHG | BODY MASS INDEX: 29.4 KG/M2 | OXYGEN SATURATION: 99 % | HEART RATE: 75 BPM | RESPIRATION RATE: 12 BRPM | WEIGHT: 210 LBS

## 2024-08-06 DIAGNOSIS — H10.33 ACUTE CONJUNCTIVITIS OF BOTH EYES, UNSPECIFIED ACUTE CONJUNCTIVITIS TYPE: ICD-10-CM

## 2024-08-06 PROCEDURE — 99283 EMERGENCY DEPT VISIT LOW MDM: CPT

## 2024-08-06 RX ORDER — ERYTHROMYCIN 5 MG/G
0.5 OINTMENT OPHTHALMIC AT BEDTIME
Qty: 1 G | Refills: 0 | Status: SHIPPED | OUTPATIENT
Start: 2024-08-06

## 2024-08-06 ASSESSMENT — ACTIVITIES OF DAILY LIVING (ADL)
ADLS_ACUITY_SCORE: 33
ADLS_ACUITY_SCORE: 33

## 2024-08-06 NOTE — ED PROVIDER NOTES
"  Emergency Department Note      History of Present Illness     Chief Complaint   Eye Drainage    HPI   Orlin Griggs is a 59 year old male with history of hypertension and hyperlipidemia who presents for bilateral eye drainage. Five hours ago, patient noticed that his eyes were red and had pustulant drainage (worse in the left). No pain or itchiness. No acute changes to his vision. Patient wears contacts at baseline but took them out when symptoms started. He says that he regularily changes his contacts as directed. 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 and has hygiene concerns and thinks that he contracted the virus at work. Last month, he was sick a couple of times with sinus congestion. No current rhinorrhea, sore throat, cough, fever, or sneezing.    Independent Historian   None    Review of External Notes   None     Past Medical History     Medical History and Problem List   Anxiety  CHF  Hypertension  History of cocaine use  Hyperlipidemia  LBBB   Mild ascending aorta dilation  Nonischemic cardiomyopathy  Prostate cancer     Medications   Coreg   Lisinopril     Surgical History   Appendectomy  Right knee scope and med/lat meniscus repair  Cholecystectomy  Tonsillectomy and adenoidectomy  Umbilical hernia repair   Prostatectomy     Physical Exam     Patient Vitals for the past 24 hrs:   BP Temp Temp src Pulse Resp SpO2 Height Weight   08/06/24 0348 (!) 147/97 97.9  F (36.6  C) Oral 75 12 99 % 1.803 m (5' 11\") 95.3 kg (210 lb)     Physical Exam  GENERAL: Patient well-appearing  HEAD: Atraumatic.  NECK: No rigidity  EYE: PERRL, EOMI. no hyphema or hypopyon.  Faint injected sclera bilaterally.  Crusting at the medial canthus.  No active pus or drainage.  Visual acuity 20/50 left eye.  20/30 right eye.  Patient states his vision is at its baseline.  PULM: breathing comfortably  ABD: Soft, nontender, nondistended, no guarding  DERM: No rash. Skin warm and dry  EXTREMITY: Moving all extremities " without difficulty.     Diagnostics     Lab Results   Labs Ordered and Resulted from Time of ED Arrival to Time of ED Departure - No data to display    Imaging   No orders to display     EKG   None     Independent Interpretation   None    ED Course      Medications Administered   Medications - No data to display    Procedures   None      Discussion of Management   None    ED Course   ED Course as of 08/06/24 0729   Tue Aug 06, 2024   0608 I evaluated the patient, obtained history, and performed a physical exam as detailed above. Patient is safe for discharge.      Additional Documentation  None    Medical Decision Making / Diagnosis     CMS Diagnoses: None    MIPS   None    MDM   Orlin Griggs is a 59 year old male     Symptoms most consistent with conjunctivitis - appears viral.    Differential diagnosis-considered bacterial conjunctivitis, allergic conjunctivitis, iritis, among others.  However exam not consistent with these etiologies.    No acute vision changes.    I recommended good hand hygiene and monitoring.      Will give prescription for erythromycin in case symptoms not improved in 48 to 72 hours but discussed holding and conservative measures first due to this likely being viral.    I have evaluated the patient for acute medical emergencies and have clinically decided no further acute medical interventions are required. Patient stable for discharge. The differential diagnosis and treatment modalities were discussed thoroughly with the patient. Given strict return precautions. All questions answered. Patient content with plan. Recommended PCP follow-up in 2-3 days.      Disposition   The patient was discharged.     Diagnosis     ICD-10-CM    1. Acute conjunctivitis of both eyes, unspecified acute conjunctivitis type  H10.33          Discharge Medications   Discharge Medication List as of 8/6/2024  6:24 AM        START taking these medications    Details   erythromycin (ROMYCIN) 5 MG/GM ophthalmic ointment  Place 0.5 inches into both eyes at bedtimeDisp-1 g, [id number]           Scribe Disclosure:  I, Kori Arguelles, am serving as a scribe at 6:23 AM on 8/6/2024 to document services personally performed by Jose Manuel Blue MD based on my observations and the provider's statements to me.        Jose Manuel Blue MD  08/06/24 0751

## 2024-08-06 NOTE — ED TRIAGE NOTES
"Pt noticed puss and \"crust\" coming out of both eyes starting yesterday. Denies changes to vision. Pt also reports having a cold for a week.         "

## 2024-08-06 NOTE — DISCHARGE INSTRUCTIONS
"Discharge Instructions  Conjunctivitis  Conjunctivitis, or \"pinkeye\", is inflammation of the conjunctiva, which is the thin membrane that lines the inner surface of the eyelids and the whites of the eyes.   There are four main types of conjunctivitis: viral, bacterial, allergic, and non-specific. Both bacterial and viral conjunctivitis spread easily from one person to another by contact with the eye or another person s hands, by an object the infected person has touched (such as a door handle), or by sharing an object that has touched their eye (such as a towel or pillowcase). Because of this, children with bacterial conjunctivitis cannot go back to school or  until they have been on antibiotics for 24 hours.  Generally, every Emergency Department visit should have a follow-up clinic visit with either a primary or a specialty clinic/provider. Please follow-up as instructed by your emergency provider today.  VIRAL CONJUNCTIVITIS: The virus that causes the common cold and is often seen as part of a general cold typically causes this type of conjunctivitis.  This type of conjunctivitis is not treated with antibiotics, and usually lasts 3 - 5 days.  An over-the-counter antihistamine/decongestant eye drop may help to relieve the itching and irritation of viral conjunctivitis.  BACTERIAL CONJUNCTIVITIS:  This is treated with an antibiotic ointment or eye drop.  In both bacterial and viral conjunctivitis, do not wear contact lenses until your eye is no longer red.   Your contact case should be thrown away and the contacts disinfected overnight, or replaced if disposable.  NON-SPECIFIC CONJUNCTIVITIS: Sometimes a red eye is caused by other things such as dry eye, chemical exposure, or foreign body in the eye such as dust or eyelash.   All of these problems generally improve on their own within 24 hours.  ALLERGIC CONJUNCTIVITIS: These are eye symptoms caused by allergies. This type of conjunctivitis will be treated " with allergy medications.    Return to the Emergency Department if:  If you have blurry vision.  If you have increasing eye pain or drainage.  If you have new redness or swelling in the skin around the eye.  If you were given a prescription for medicine here today, be sure to read all of the information (including the package insert) that comes with your prescription.  This will include important information about the medicine, its side effects, and any warnings that you need to know about.  The pharmacist who fills the prescription can provide more information and answer questions you may have about the medicine.  If you have questions or concerns that the pharmacist cannot address, please call or return to the Emergency Department.   Remember that you can always come back to the Emergency Department if you are not able to see your regular provider in the amount of time listed above, if you get any new symptoms, or if there is anything that worries you.      Return to the emergency department if symptoms are worsening, become concerning, or for any other concerns. Follow-up with your doctor in 2-3 and sooner if needed.      I would wait 2 to 3 days to see if symptoms start to improve before using an antibiotic.

## 2024-08-07 ENCOUNTER — PATIENT OUTREACH (OUTPATIENT)
Dept: INTERNAL MEDICINE | Facility: CLINIC | Age: 59
End: 2024-08-07
Payer: COMMERCIAL

## 2024-09-04 DIAGNOSIS — I10 ESSENTIAL HYPERTENSION, BENIGN: ICD-10-CM

## 2024-09-04 DIAGNOSIS — I42.8 NONISCHEMIC CARDIOMYOPATHY (H): Primary | ICD-10-CM

## 2024-09-04 DIAGNOSIS — I44.7 LBBB (LEFT BUNDLE BRANCH BLOCK): ICD-10-CM

## 2024-10-22 ENCOUNTER — HOSPITAL ENCOUNTER (OUTPATIENT)
Dept: CARDIOLOGY | Facility: CLINIC | Age: 59
Discharge: HOME | End: 2024-10-22
Attending: INTERNAL MEDICINE | Admitting: INTERNAL MEDICINE
Payer: COMMERCIAL

## 2024-10-22 DIAGNOSIS — I42.8 NONISCHEMIC CARDIOMYOPATHY (H): ICD-10-CM

## 2024-10-22 LAB
BI-PLANE LVEF ECHO: NORMAL
LVEF ECHO: NORMAL

## 2024-10-22 PROCEDURE — 93306 TTE W/DOPPLER COMPLETE: CPT

## 2024-10-22 PROCEDURE — 93306 TTE W/DOPPLER COMPLETE: CPT | Mod: 26 | Performed by: INTERNAL MEDICINE

## 2024-10-23 ENCOUNTER — LAB (OUTPATIENT)
Dept: LAB | Facility: CLINIC | Age: 59
End: 2024-10-23
Payer: COMMERCIAL

## 2024-10-23 DIAGNOSIS — I42.8 NONISCHEMIC CARDIOMYOPATHY (H): ICD-10-CM

## 2024-10-23 DIAGNOSIS — I10 ESSENTIAL HYPERTENSION, BENIGN: ICD-10-CM

## 2024-10-23 DIAGNOSIS — I44.7 LBBB (LEFT BUNDLE BRANCH BLOCK): ICD-10-CM

## 2024-10-23 LAB
ALT SERPL W P-5'-P-CCNC: 33 U/L (ref 0–70)
ANION GAP SERPL CALCULATED.3IONS-SCNC: 8 MMOL/L (ref 7–15)
BUN SERPL-MCNC: 21.8 MG/DL (ref 8–23)
CALCIUM SERPL-MCNC: 9.6 MG/DL (ref 8.8–10.4)
CHLORIDE SERPL-SCNC: 104 MMOL/L (ref 98–107)
CHOLEST SERPL-MCNC: 150 MG/DL
CREAT SERPL-MCNC: 1.32 MG/DL (ref 0.67–1.17)
EGFRCR SERPLBLD CKD-EPI 2021: 62 ML/MIN/1.73M2
FASTING STATUS PATIENT QL REPORTED: YES
GLUCOSE SERPL-MCNC: 104 MG/DL (ref 70–99)
HCO3 SERPL-SCNC: 29 MMOL/L (ref 22–29)
HDLC SERPL-MCNC: 39 MG/DL
LDLC SERPL CALC-MCNC: 92 MG/DL
NONHDLC SERPL-MCNC: 111 MG/DL
POTASSIUM SERPL-SCNC: 4 MMOL/L (ref 3.4–5.3)
SODIUM SERPL-SCNC: 141 MMOL/L (ref 135–145)
TRIGL SERPL-MCNC: 94 MG/DL

## 2024-10-23 PROCEDURE — 80061 LIPID PANEL: CPT

## 2024-10-23 PROCEDURE — 36415 COLL VENOUS BLD VENIPUNCTURE: CPT

## 2024-10-23 PROCEDURE — 84460 ALANINE AMINO (ALT) (SGPT): CPT

## 2024-10-23 PROCEDURE — 80048 BASIC METABOLIC PNL TOTAL CA: CPT

## 2024-10-25 ENCOUNTER — OFFICE VISIT (OUTPATIENT)
Dept: CARDIOLOGY | Facility: CLINIC | Age: 59
End: 2024-10-25
Attending: INTERNAL MEDICINE
Payer: COMMERCIAL

## 2024-10-25 VITALS
DIASTOLIC BLOOD PRESSURE: 86 MMHG | OXYGEN SATURATION: 95 % | BODY MASS INDEX: 28.47 KG/M2 | HEART RATE: 70 BPM | HEIGHT: 72 IN | SYSTOLIC BLOOD PRESSURE: 130 MMHG | WEIGHT: 210.2 LBS

## 2024-10-25 DIAGNOSIS — I42.8 NONISCHEMIC CARDIOMYOPATHY (H): ICD-10-CM

## 2024-10-25 DIAGNOSIS — I77.810 MILD ASCENDING AORTA DILATION (H): ICD-10-CM

## 2024-10-25 DIAGNOSIS — E78.5 HYPERLIPIDEMIA WITH TARGET LDL LESS THAN 100: Primary | ICD-10-CM

## 2024-10-25 DIAGNOSIS — I10 ESSENTIAL HYPERTENSION, BENIGN: ICD-10-CM

## 2024-10-25 PROCEDURE — 99214 OFFICE O/P EST MOD 30 MIN: CPT | Performed by: INTERNAL MEDICINE

## 2024-10-25 RX ORDER — CARVEDILOL 12.5 MG/1
12.5 TABLET ORAL 2 TIMES DAILY
Qty: 180 TABLET | Refills: 4 | Status: SHIPPED | OUTPATIENT
Start: 2024-10-25

## 2024-10-25 NOTE — LETTER
10/25/2024    Lyndon Steve MD  [address] [facility] [address]    RE: Orlin Griggs       Dear Colleague,     I had the pleasure of seeing Orlin Griggs in the [facility].    General Cardiology Clinic Progress Note  Orlin Griggs MRN# [id number]   YOB: 1965 Age: 59 year old       Reason for visit: Nonischemic cardiomyopathy, chronic left bundle branch block, mild dilation of the ascending aorta    History of presenting illness:    I had the opportunity to see Mr. Orlin Griggs in Cardiology Clinic today at [facility] in [place] for reevaluation of mild idiopathic cardiomyopathy, chronic left bundle branch block, dyslipidemia, hypertension, and mild dilation of the ascending aorta.  Mr. Griggs is a 59-year-old gentleman who was identified as having moderate nonischemic cardiomyopathy a number of years ago after evaluation of a left bundle branch block on ECG.  His ejection fraction was as low as 30% initially.  Fortunately, with medical therapy, his left ventricular function has improved and has now stabilized with a low-normal ejection fraction of 50% -55%.  This year, his echocardiogram is unchanged.  His ejection fraction was again stable at 50 to 55%..  His left ventricular chamber size is normal at 4.8 cm, unchanged since last year, and improved since several years ago when he had borderline left ventricular enlargement..  His ascending aorta also appears stable with a measurement of 4.3 cm, but was measured at 4.0 cm by MRI in the past.  His aortic valve is trileaflet.  His creatinine is stable at 1.3, but has been somewhat variable over the years.  His home blood pressures continue to be consistently normal, rarely getting into the 130s.  He was previously on lisinopril, and we stopped that because of mildly elevated creatinine levels.  His kidney function has basically been unchanged since then.     Cholesterol numbers this year look good with an  LDL of 92 and HDL 39.    He was able to get through a 3 and half hour radical prostatectomy surgery a year ago for treatment of prostate cancer and is now doing well, and has recovered fully.    Blood pressure here today was 130/86, heart rate 70, and weight 210 pounds.  His heart rhythm is regular.  He has no cardiac murmurs.  There are no carotid bruits.            Assessment and Plan:     ASSESSMENT:    Mr. Orlin Griggs is a 59-year-old gentleman with history of nonischemic cardiomyopathy with an ejection fraction as low as 30% in the past.  With lisinopril and carvedilol, his left ventricular function improved up to borderline normal and stabilized.  His ejection fraction remained stable this year at 50 to 55%.  He continues to have mild dilation of the ascending aorta, measured at 4.3 cm which is also stable.  He is doing well without any concerning cardiac symptoms.  He has a chronic left bundle branch block, which may be preventing his left ventricular function from fully normalizing due to dyssynchrony of cardiac contraction.    Overall I am very pleased with his stable course and left ventricular function improvement.  I encouraged him to stay on the carvedilol and follow-up with me again in 1 year for reevaluation.  Will repeat a creatinine in 3 months to make sure that it is not continuing to increase.    Jose Mata MD           Orders this Visit:  Orders Placed This Encounter   Procedures     Basic metabolic panel     Basic metabolic panel     Follow-Up with Cardiology     Echocardiogram Complete     Orders Placed This Encounter   Medications     carvedilol (COREG) 12.5 MG tablet     Sig: Take 1 tablet (12.5 mg) by mouth 2 times daily.     Dispense:  180 tablet     Refill:  4     Medications Discontinued During This Encounter   Medication Reason     carvedilol (COREG) 12.5 MG tablet Reorder (No AVS)       Today's clinic visit entailed:    30 minutes spent by me on the date of the encounter doing chart  review, history and exam, documentation and further activities per the note  Provider  Link to [facility] Help Grid     The level of medical decision making during this visit was of high complexity.           Review of Systems:     Review of Systems:  Skin:        Eyes:       ENT:       Respiratory:  Negative    Cardiovascular:  Negative    Gastroenterology:      Genitourinary:       Musculoskeletal:       Neurologic:       Psychiatric:       Heme/Lymph/Imm:       Endocrine:  Negative              Physical Exam:     Vitals: /86 (BP Location: Right arm)   Pulse 70   Ht 1.829 m (6')   Wt 95.3 kg (210 lb 3.2 oz)   SpO2 95%   BMI 28.51 kg/m    Constitutional: Well nourished and in no apparent distress.  Eyes: Pupils equal, round. Sclerae anicteric.   HEENT: Normocephalic, atraumatic.   Neck: Supple. JVD   Respiratory: Breathing non-labored. Lungs clear to auscultation bilaterally. No crackles, wheezes, rhonchi, or rales.  Cardiovascular:  Regular rate and rhythm, normal S1 and S2. No murmur, rub, or gallop.  Skin: Warm, dry. No rashes, cyanosis, or xanthelasma.  Extremities: No edema.  Neurologic: No gross motor deficits. Alert, awake, and oriented to person, place and time.  Psychiatric: Affect appropriate.             Medications:     Current Outpatient Medications   Medication Sig Dispense Refill     carvedilol (COREG) 12.5 MG tablet Take 1 tablet (12.5 mg) by mouth 2 times daily. 180 tablet 4     erythromycin (ROMYCIN) 5 MG/GM ophthalmic ointment Place 0.5 inches into both eyes at bedtime (Patient not taking: Reported on 10/25/2024) 1 g 0       Family History   Problem Relation Age of Onset     Breast Cancer Mother      Cancer Father      Heart Disease Brother         pacemaker     Pacemaker Brother      Family History Negative Brother      Family History Negative Brother      Coronary Artery Disease No family hx of      Diabetes No family hx of        Social History     Socioeconomic History     Marital  status: Single     Spouse name: Not on file     Number of children: Not on file     Years of education: Not on file     Highest education level: Not on file   Occupational History     Not on file   Tobacco Use     Smoking status: Never     Smokeless tobacco: Never   Substance and Sexual Activity     Alcohol use: Yes     Comment: occ. drink     Drug use: No     Sexual activity: Not on file   Other Topics Concern     Parent/sibling w/ CABG, MI or angioplasty before 65F 55M? No      Service Not Asked     Blood Transfusions Not Asked     Caffeine Concern No     Comment: none     Occupational Exposure Not Asked     Hobby Hazards Not Asked     Sleep Concern No     Stress Concern Not Asked     Weight Concern Yes     Comment: weight loss     Special Diet Yes     Comment: watched his sodium intake     Back Care Not Asked     Exercise Yes     Comment: daily      Bike Helmet Not Asked     Seat Belt Not Asked     Self-Exams Not Asked   Social History Narrative     Not on file     Social Drivers of Health     Financial Resource Strain: Low Risk  (10/24/2023)    Financial Resource Strain      Within the past 12 months, have you or your family members you live with been unable to get utilities (heat, electricity) when it was really needed?: No   Food Insecurity: Low Risk  (10/24/2023)    Food Insecurity      Within the past 12 months, did you worry that your food would run out before you got money to buy more?: No      Within the past 12 months, did the food you bought just not last and you didn t have money to get more?: No   Transportation Needs: Low Risk  (10/24/2023)    Transportation Needs      Within the past 12 months, has lack of transportation kept you from medical appointments, getting your medicines, non-medical meetings or appointments, work, or from getting things that you need?: No   Physical Activity: Not on file   Stress: Not on file   Social Connections: Unknown (11/6/2023)    Received from [email or web address]  [facility], [facility]    Social Connections      Frequency of Communication with Friends and Family: Not on file   Interpersonal Safety: Low Risk  (10/24/2023)    Interpersonal Safety      Do you feel physically and emotionally safe where you currently live?: Yes      Within the past 12 months, have you been hit, slapped, kicked or otherwise physically hurt by someone?: No      Within the past 12 months, have you been humiliated or emotionally abused in other ways by your partner or ex-partner?: No   Housing Stability: Low Risk  (10/24/2023)    Housing Stability      Do you have housing? : Yes      Are you worried about losing your housing?: No            Past Medical History:     Past Medical History:   Diagnosis Date     Acute renal insufficiency      Anxiety      CHF (congestive heart failure), NYHA class I (H) 02/20/2015    2/21/15 Echo shows EF 30-35%, 4/14/15 Echo shows EF improved to 43-46%     Essential hypertension, benign 7/29/2015     History of cocaine use     prior     Hyperlipidemia LDL goal < 100      LBBB (left bundle branch block)      Mild ascending aorta dilation (H) 3/9/2016    3.9cm per echo 3/9/16      Nonischemic cardiomyopathy (H)     idiopathic nonischemic cardiopmyopathy; 3/9/16 Echo:  EF 45-50%; 2/21/15: EF 30-35%, 4/14/15: EF improved to 43-46%.                 Past Surgical History:     Past Surgical History:   Procedure Laterality Date     APPENDECTOMY  1991     AS KNEE SCOPE,MED/LAT MENISCUS REPAIR Right 1992     CHOLECYSTECTOMY  2009     DENTAL SURGERY  1984    4 wisdom teeeth removed without complication     TONSILLECTOMY & ADENOIDECTOMY  1987     UMBILICAL HERNIA REPAIR  2007              Allergies:   Patient has no known allergies.       Data:   All laboratory data reviewed:    Recent Labs   Lab Test 10/23/24  0750 10/24/23  0806 06/21/22  0752 12/06/21  1656   LDL 92 108* 103*  --    HDL 39* 48 37*  --    NHDL 111 126 127  --   "  CHOL 150 174 164  --    TRIG 94 91 122  --    TSH  --   --   --  3.03       Lab Results   Component Value Date    WBC 7.5 10/24/2023    WBC 6.8 10/23/2018    RBC 5.32 10/24/2023    RBC 4.48 10/23/2018    HGB 16.2 10/24/2023    HGB 13.7 10/23/2018    HCT 47.7 10/24/2023    HCT 40.3 10/23/2018    MCV 90 10/24/2023    MCV 90 10/23/2018    MCH 30.5 10/24/2023    MCH 30.6 10/23/2018    MCHC 34.0 10/24/2023    MCHC 34.0 10/23/2018    RDW 13.1 10/24/2023    RDW 13.4 10/23/2018     10/24/2023     10/23/2018       Lab Results   Component Value Date     10/23/2024     09/03/2020    POTASSIUM 4.0 10/23/2024    POTASSIUM 3.8 06/21/2022    POTASSIUM 4.0 09/03/2020    CHLORIDE 104 10/23/2024    CHLORIDE 105 06/21/2022    CHLORIDE 101 09/03/2020    CO2 29 10/23/2024    CO2 29 06/21/2022    CO2 27 09/03/2020    ANIONGAP 8 10/23/2024    ANIONGAP 3 06/21/2022    ANIONGAP 5 09/03/2020     (H) 10/23/2024     (H) 06/21/2022    GLC 84 09/03/2020    BUN 21.8 10/23/2024    BUN 20 06/21/2022    BUN 19 09/03/2020    CR 1.32 (H) 10/23/2024    CR 1.25 09/03/2020    GFRESTIMATED 62 10/23/2024    GFRESTIMATED 64 09/03/2020    GFRESTBLACK 74 09/03/2020    PREET 9.6 10/23/2024    PREET 9.1 09/03/2020      Lab Results   Component Value Date    AST 19 12/06/2021    AST 34 02/19/2015    ALT 33 10/23/2024    ALT 16 05/06/2019       No results found for: \"A1C\"    No results found for: \"INR\"      JOSE MATA MD  [facility]    Thank you for allowing me to participate in the care of your patient.      Sincerely,     JOSE MATA MD     [facility]  cc:   Jose Mata MD  [address] [place] [id number]      "

## 2024-10-25 NOTE — PROGRESS NOTES
General Cardiology Clinic Progress Note  Orlin Griggs MRN# [id number]   YOB: 1965 Age: 59 year old       Reason for visit: Nonischemic cardiomyopathy, chronic left bundle branch block, mild dilation of the ascending aorta    History of presenting illness:    I had the opportunity to see Mr. Orlin Griggs in Cardiology Clinic today at [facility] in [place] for reevaluation of mild idiopathic cardiomyopathy, chronic left bundle branch block, dyslipidemia, hypertension, and mild dilation of the ascending aorta.  Mr. Griggs is a 59-year-old gentleman who was identified as having moderate nonischemic cardiomyopathy a number of years ago after evaluation of a left bundle branch block on ECG.  His ejection fraction was as low as 30% initially.  Fortunately, with medical therapy, his left ventricular function has improved and has now stabilized with a low-normal ejection fraction of 50% -55%.  This year, his echocardiogram is unchanged.  His ejection fraction was again stable at 50 to 55%..  His left ventricular chamber size is normal at 4.8 cm, unchanged since last year, and improved since several years ago when he had borderline left ventricular enlargement..  His ascending aorta also appears stable with a measurement of 4.3 cm, but was measured at 4.0 cm by MRI in the past.  His aortic valve is trileaflet.  His creatinine is stable at 1.3, but has been somewhat variable over the years.  His home blood pressures continue to be consistently normal, rarely getting into the 130s.  He was previously on lisinopril, and we stopped that because of mildly elevated creatinine levels.  His kidney function has basically been unchanged since then.     Cholesterol numbers this year look good with an LDL of 92 and HDL 39.    He was able to get through a 3 and half hour radical prostatectomy surgery a year ago for treatment of prostate cancer and is now doing well, and has recovered fully.    Blood  pressure here today was 130/86, heart rate 70, and weight 210 pounds.  His heart rhythm is regular.  He has no cardiac murmurs.  There are no carotid bruits.            Assessment and Plan:     ASSESSMENT:    Mr. Orlin Griggs is a 59-year-old gentleman with history of nonischemic cardiomyopathy with an ejection fraction as low as 30% in the past.  With lisinopril and carvedilol, his left ventricular function improved up to borderline normal and stabilized.  His ejection fraction remained stable this year at 50 to 55%.  He continues to have mild dilation of the ascending aorta, measured at 4.3 cm which is also stable.  He is doing well without any concerning cardiac symptoms.  He has a chronic left bundle branch block, which may be preventing his left ventricular function from fully normalizing due to dyssynchrony of cardiac contraction.    Overall I am very pleased with his stable course and left ventricular function improvement.  I encouraged him to stay on the carvedilol and follow-up with me again in 1 year for reevaluation.  Will repeat a creatinine in 3 months to make sure that it is not continuing to increase.    Jose Mata MD           Orders this Visit:  Orders Placed This Encounter   Procedures    Basic metabolic panel    Basic metabolic panel    Follow-Up with Cardiology    Echocardiogram Complete     Orders Placed This Encounter   Medications    carvedilol (COREG) 12.5 MG tablet     Sig: Take 1 tablet (12.5 mg) by mouth 2 times daily.     Dispense:  180 tablet     Refill:  4     Medications Discontinued During This Encounter   Medication Reason    carvedilol (COREG) 12.5 MG tablet Reorder (No AVS)       Today's clinic visit entailed:    30 minutes spent by me on the date of the encounter doing chart review, history and exam, documentation and further activities per the note  Provider  Link to [facility] Help Grid     The level of medical decision making during this visit was of high complexity.           Review  of Systems:     Review of Systems:  Skin:        Eyes:       ENT:       Respiratory:  Negative    Cardiovascular:  Negative    Gastroenterology:      Genitourinary:       Musculoskeletal:       Neurologic:       Psychiatric:       Heme/Lymph/Imm:       Endocrine:  Negative              Physical Exam:     Vitals: /86 (BP Location: Right arm)   Pulse 70   Ht 1.829 m (6')   Wt 95.3 kg (210 lb 3.2 oz)   SpO2 95%   BMI 28.51 kg/m    Constitutional: Well nourished and in no apparent distress.  Eyes: Pupils equal, round. Sclerae anicteric.   HEENT: Normocephalic, atraumatic.   Neck: Supple. JVD   Respiratory: Breathing non-labored. Lungs clear to auscultation bilaterally. No crackles, wheezes, rhonchi, or rales.  Cardiovascular:  Regular rate and rhythm, normal S1 and S2. No murmur, rub, or gallop.  Skin: Warm, dry. No rashes, cyanosis, or xanthelasma.  Extremities: No edema.  Neurologic: No gross motor deficits. Alert, awake, and oriented to person, place and time.  Psychiatric: Affect appropriate.             Medications:     Current Outpatient Medications   Medication Sig Dispense Refill    carvedilol (COREG) 12.5 MG tablet Take 1 tablet (12.5 mg) by mouth 2 times daily. 180 tablet 4    erythromycin (ROMYCIN) 5 MG/GM ophthalmic ointment Place 0.5 inches into both eyes at bedtime (Patient not taking: Reported on 10/25/2024) 1 g 0       Family History   Problem Relation Age of Onset    Breast Cancer Mother     Cancer Father     Heart Disease Brother         pacemaker    Pacemaker Brother     Family History Negative Brother     Family History Negative Brother     Coronary Artery Disease No family hx of     Diabetes No family hx of        Social History     Socioeconomic History    Marital status: Single     Spouse name: Not on file    Number of children: Not on file    Years of education: Not on file    Highest education level: Not on file   Occupational History    Not on file   Tobacco Use    Smoking status:  Never    Smokeless tobacco: Never   Substance and Sexual Activity    Alcohol use: Yes     Comment: occ. drink    Drug use: No    Sexual activity: Not on file   Other Topics Concern    Parent/sibling w/ CABG, MI or angioplasty before 65F 55M? No     Service Not Asked    Blood Transfusions Not Asked    Caffeine Concern No     Comment: none    Occupational Exposure Not Asked    Hobby Hazards Not Asked    Sleep Concern No    Stress Concern Not Asked    Weight Concern Yes     Comment: weight loss    Special Diet Yes     Comment: watched his sodium intake    Back Care Not Asked    Exercise Yes     Comment: daily     Bike Helmet Not Asked    Seat Belt Not Asked    Self-Exams Not Asked   Social History Narrative    Not on file     Social Drivers of Health     Financial Resource Strain: Low Risk  (10/24/2023)    Financial Resource Strain     Within the past 12 months, have you or your family members you live with been unable to get utilities (heat, electricity) when it was really needed?: No   Food Insecurity: Low Risk  (10/24/2023)    Food Insecurity     Within the past 12 months, did you worry that your food would run out before you got money to buy more?: No     Within the past 12 months, did the food you bought just not last and you didn t have money to get more?: No   Transportation Needs: Low Risk  (10/24/2023)    Transportation Needs     Within the past 12 months, has lack of transportation kept you from medical appointments, getting your medicines, non-medical meetings or appointments, work, or from getting things that you need?: No   Physical Activity: Not on file   Stress: Not on file   Social Connections: Unknown (11/6/2023)    Received from [facility], [facility]    Social Connections     Frequency of Communication with Friends and Family: Not on file   Interpersonal Safety: Low Risk  (10/24/2023)    Interpersonal Safety     Do you feel  physically and emotionally safe where you currently live?: Yes     Within the past 12 months, have you been hit, slapped, kicked or otherwise physically hurt by someone?: No     Within the past 12 months, have you been humiliated or emotionally abused in other ways by your partner or ex-partner?: No   Housing Stability: Low Risk  (10/24/2023)    Housing Stability     Do you have housing? : Yes     Are you worried about losing your housing?: No            Past Medical History:     Past Medical History:   Diagnosis Date    Acute renal insufficiency     Anxiety     CHF (congestive heart failure), NYHA class I (H) 02/20/2015    2/21/15 Echo shows EF 30-35%, 4/14/15 Echo shows EF improved to 43-46%    Essential hypertension, benign 7/29/2015    History of cocaine use     prior    Hyperlipidemia LDL goal < 100     LBBB (left bundle branch block)     Mild ascending aorta dilation (H) 3/9/2016    3.9cm per echo 3/9/16     Nonischemic cardiomyopathy (H)     idiopathic nonischemic cardiopmyopathy; 3/9/16 Echo:  EF 45-50%; 2/21/15: EF 30-35%, 4/14/15: EF improved to 43-46%.                 Past Surgical History:     Past Surgical History:   Procedure Laterality Date    APPENDECTOMY  1991    AS KNEE SCOPE,MED/LAT MENISCUS REPAIR Right 1992    CHOLECYSTECTOMY  2009    DENTAL SURGERY  1984    4 wisdom teeeth removed without complication    TONSILLECTOMY & ADENOIDECTOMY  1987    UMBILICAL HERNIA REPAIR  2007              Allergies:   Patient has no known allergies.       Data:   All laboratory data reviewed:    Recent Labs   Lab Test 10/23/24  0750 10/24/23  0806 06/21/22  0752 12/06/21  1656   LDL 92 108* 103*  --    HDL 39* 48 37*  --    NHDL 111 126 127  --    CHOL 150 174 164  --    TRIG 94 91 122  --    TSH  --   --   --  3.03       Lab Results   Component Value Date    WBC 7.5 10/24/2023    WBC 6.8 10/23/2018    RBC 5.32 10/24/2023    RBC 4.48 10/23/2018    HGB 16.2 10/24/2023    HGB 13.7 10/23/2018    HCT 47.7 10/24/2023     "HCT 40.3 10/23/2018    MCV 90 10/24/2023    MCV 90 10/23/2018    MCH 30.5 10/24/2023    MCH 30.6 10/23/2018    MCHC 34.0 10/24/2023    MCHC 34.0 10/23/2018    RDW 13.1 10/24/2023    RDW 13.4 10/23/2018     10/24/2023     10/23/2018       Lab Results   Component Value Date     10/23/2024     09/03/2020    POTASSIUM 4.0 10/23/2024    POTASSIUM 3.8 06/21/2022    POTASSIUM 4.0 09/03/2020    CHLORIDE 104 10/23/2024    CHLORIDE 105 06/21/2022    CHLORIDE 101 09/03/2020    CO2 29 10/23/2024    CO2 29 06/21/2022    CO2 27 09/03/2020    ANIONGAP 8 10/23/2024    ANIONGAP 3 06/21/2022    ANIONGAP 5 09/03/2020     (H) 10/23/2024     (H) 06/21/2022    GLC 84 09/03/2020    BUN 21.8 10/23/2024    BUN 20 06/21/2022    BUN 19 09/03/2020    CR 1.32 (H) 10/23/2024    CR 1.25 09/03/2020    GFRESTIMATED 62 10/23/2024    GFRESTIMATED 64 09/03/2020    GFRESTBLACK 74 09/03/2020    PREET 9.6 10/23/2024    PREET 9.1 09/03/2020      Lab Results   Component Value Date    AST 19 12/06/2021    AST 34 02/19/2015    ALT 33 10/23/2024    ALT 16 05/06/2019       No results found for: \"A1C\"    No results found for: \"INR\"      MEENA AGGARWAL MD  [facility]  "

## 2024-10-25 NOTE — PATIENT INSTRUCTIONS
It was a pleasure seeing you today and thank you for allowing me to be a part of your health care team.  Should you have any questions regarding your visit or future needs please feel free to reach out to my care team for assistance.      Thank you, Dr. Jose Mata        **Nursing: [phone]       **Scheduling: [phone]

## 2025-02-11 ENCOUNTER — PATIENT OUTREACH (OUTPATIENT)
Dept: CARE COORDINATION | Facility: CLINIC | Age: 60
End: 2025-02-11
Payer: COMMERCIAL